# Patient Record
Sex: MALE | Race: WHITE | NOT HISPANIC OR LATINO | Employment: OTHER | URBAN - NONMETROPOLITAN AREA
[De-identification: names, ages, dates, MRNs, and addresses within clinical notes are randomized per-mention and may not be internally consistent; named-entity substitution may affect disease eponyms.]

---

## 2017-07-31 ENCOUNTER — COMMUNICATION - GICH (OUTPATIENT)
Dept: INTERNAL MEDICINE | Facility: OTHER | Age: 82
End: 2017-07-31

## 2017-07-31 DIAGNOSIS — I10 ESSENTIAL (PRIMARY) HYPERTENSION: ICD-10-CM

## 2017-08-18 ENCOUNTER — COMMUNICATION - GICH (OUTPATIENT)
Dept: INTERNAL MEDICINE | Facility: OTHER | Age: 82
End: 2017-08-18

## 2017-08-18 DIAGNOSIS — E78.5 HYPERLIPIDEMIA: ICD-10-CM

## 2017-08-21 ENCOUNTER — COMMUNICATION - GICH (OUTPATIENT)
Dept: INTERNAL MEDICINE | Facility: OTHER | Age: 82
End: 2017-08-21

## 2017-08-21 DIAGNOSIS — E78.5 HYPERLIPIDEMIA: ICD-10-CM

## 2017-11-07 ENCOUNTER — COMMUNICATION - GICH (OUTPATIENT)
Dept: INTERNAL MEDICINE | Facility: OTHER | Age: 82
End: 2017-11-07

## 2017-11-07 DIAGNOSIS — E78.5 HYPERLIPIDEMIA: ICD-10-CM

## 2017-11-07 DIAGNOSIS — I10 ESSENTIAL (PRIMARY) HYPERTENSION: ICD-10-CM

## 2017-11-07 DIAGNOSIS — R39.9 UNSPECIFIED SYMPTOMS AND SIGNS INVOLVING THE GENITOURINARY SYSTEM: ICD-10-CM

## 2017-11-16 ENCOUNTER — COMMUNICATION - GICH (OUTPATIENT)
Dept: INTERNAL MEDICINE | Facility: OTHER | Age: 82
End: 2017-11-16

## 2017-11-16 DIAGNOSIS — I10 ESSENTIAL (PRIMARY) HYPERTENSION: ICD-10-CM

## 2017-11-16 DIAGNOSIS — E78.5 HYPERLIPIDEMIA: ICD-10-CM

## 2017-11-29 ENCOUNTER — AMBULATORY - GICH (OUTPATIENT)
Dept: LAB | Facility: OTHER | Age: 82
End: 2017-11-29

## 2017-11-29 ENCOUNTER — HOSPITAL ENCOUNTER (OUTPATIENT)
Dept: RADIOLOGY | Facility: OTHER | Age: 82
End: 2017-11-29
Attending: INTERNAL MEDICINE

## 2017-11-29 ENCOUNTER — OFFICE VISIT - GICH (OUTPATIENT)
Dept: INTERNAL MEDICINE | Facility: OTHER | Age: 82
End: 2017-11-29

## 2017-11-29 ENCOUNTER — HISTORY (OUTPATIENT)
Dept: INTERNAL MEDICINE | Facility: OTHER | Age: 82
End: 2017-11-29

## 2017-11-29 DIAGNOSIS — I10 ESSENTIAL (PRIMARY) HYPERTENSION: ICD-10-CM

## 2017-11-29 DIAGNOSIS — L57.0 ACTINIC KERATOSIS: ICD-10-CM

## 2017-11-29 DIAGNOSIS — M79.672 PAIN OF LEFT FOOT: ICD-10-CM

## 2017-11-29 DIAGNOSIS — N18.30 CHRONIC KIDNEY DISEASE, STAGE III (MODERATE) (H): ICD-10-CM

## 2017-11-29 DIAGNOSIS — R39.9 UNSPECIFIED SYMPTOMS AND SIGNS INVOLVING THE GENITOURINARY SYSTEM: ICD-10-CM

## 2017-11-29 DIAGNOSIS — Z00.00 ENCOUNTER FOR GENERAL ADULT MEDICAL EXAMINATION WITHOUT ABNORMAL FINDINGS: ICD-10-CM

## 2017-11-29 DIAGNOSIS — I25.10 ATHEROSCLEROTIC HEART DISEASE OF NATIVE CORONARY ARTERY WITHOUT ANGINA PECTORIS: ICD-10-CM

## 2017-11-29 DIAGNOSIS — E78.5 HYPERLIPIDEMIA: ICD-10-CM

## 2017-11-29 DIAGNOSIS — I65.29 OCCLUSION AND STENOSIS OF UNSPECIFIED CAROTID ARTERY: ICD-10-CM

## 2017-11-29 LAB
A/G RATIO - HISTORICAL: 1.5 (ref 1–2)
ALBUMIN SERPL-MCNC: 4.3 G/DL (ref 3.5–5.7)
ALP SERPL-CCNC: 41 IU/L (ref 34–104)
ALT (SGPT) - HISTORICAL: 9 IU/L (ref 7–52)
ANION GAP - HISTORICAL: 11 (ref 5–18)
AST SERPL-CCNC: 17 IU/L (ref 13–39)
BILIRUB SERPL-MCNC: 0.9 MG/DL (ref 0.3–1)
BUN SERPL-MCNC: 21 MG/DL (ref 7–25)
BUN/CREAT RATIO - HISTORICAL: 17
CALCIUM SERPL-MCNC: 9.7 MG/DL (ref 8.6–10.3)
CHLORIDE SERPLBLD-SCNC: 103 MMOL/L (ref 98–107)
CHOL/HDL RATIO - HISTORICAL: 2.16
CHOLESTEROL TOTAL: 121 MG/DL
CO2 SERPL-SCNC: 25 MMOL/L (ref 21–31)
CREAT SERPL-MCNC: 1.25 MG/DL (ref 0.7–1.3)
GFR IF NOT AFRICAN AMERICAN - HISTORICAL: 55 ML/MIN/1.73M2
GLOBULIN - HISTORICAL: 2.8 G/DL (ref 2–3.7)
GLUCOSE SERPL-MCNC: 101 MG/DL (ref 70–105)
HDLC SERPL-MCNC: 56 MG/DL (ref 23–92)
LDLC SERPL CALC-MCNC: 50 MG/DL
NON-HDL CHOLESTEROL - HISTORICAL: 65 MG/DL
POTASSIUM SERPL-SCNC: 4.1 MMOL/L (ref 3.5–5.1)
PROT SERPL-MCNC: 7.1 G/DL (ref 6.4–8.9)
PROVIDER ORDERDED STATUS - HISTORICAL: NORMAL
SODIUM SERPL-SCNC: 139 MMOL/L (ref 133–143)
TRIGL SERPL-MCNC: 73 MG/DL

## 2017-11-29 ASSESSMENT — PATIENT HEALTH QUESTIONNAIRE - PHQ9: SUM OF ALL RESPONSES TO PHQ QUESTIONS 1-9: 0

## 2017-12-27 ENCOUNTER — OFFICE VISIT - GICH (OUTPATIENT)
Dept: INTERNAL MEDICINE | Facility: OTHER | Age: 82
End: 2017-12-27

## 2017-12-27 ENCOUNTER — HISTORY (OUTPATIENT)
Dept: INTERNAL MEDICINE | Facility: OTHER | Age: 82
End: 2017-12-27

## 2017-12-27 ENCOUNTER — COMMUNICATION - GICH (OUTPATIENT)
Dept: INTERNAL MEDICINE | Facility: OTHER | Age: 82
End: 2017-12-27

## 2017-12-27 DIAGNOSIS — I10 ESSENTIAL (PRIMARY) HYPERTENSION: ICD-10-CM

## 2017-12-27 ASSESSMENT — PATIENT HEALTH QUESTIONNAIRE - PHQ9: SUM OF ALL RESPONSES TO PHQ QUESTIONS 1-9: 0

## 2017-12-28 NOTE — TELEPHONE ENCOUNTER
Patient Information     Patient Name MRN Sex Alvaro Prasad Jr [id number] Male 1935      Telephone Encounter by Rohnda Solano RN at 2017  3:09 PM     Author:  Rhonda Solano RN Service:  (none) Author Type:  NURS- Registered Nurse     Filed:  2017  3:11 PM Encounter Date:  2017 Status:  Signed     :  Rhonda Solano RN (NURS- Registered Nurse)            Statins    Office visit in the past 12 months.    Last visit with TAYO PRIDE was on: 10/31/2016 in [facility]  Next visit with TAYO PRIDE is on: No future appointment listed with this provider  Next visit with Internal Medicine is on: No future appointment listed in this department    Lab testing requirements:  Lipids annually.  Repeat lipids 6-8 weeks after dosage or drug change.    Last Lipids:  Chol: 120    2016  T    2016  HDL:   51    2016  LDL:  52    2016  LDL DIRECT:  No results found in past 5 years    .    Concommitant use of fibrates and statins-If it is an addition to the medication list, review note and/or discuss with provider.  If already on medication list, refill.    Max refills 12 months from last office visit.      Angiotensin Receptor Blockers (ARB)    Office visit in the past 12 months or per provider note.    Lab test requirements:  Creatinine and Potassium annually, if ordering lab, order BMP.  CREATININE (mg/dL)    Date Value   2016 1.14     POTASSIUM (mmol/L)    Date Value   2016 3.9       Max refill for 12 months from last office visit or per provider note    BPH    Office visit in the past 12 months or per provider note.    Max refill for 12 months from last office visit or per provider note.    Patient is due for medication management appointment. Limited refill provided at this time. Particle Code message and/or letter sent for reminder to patient. Prescription refilled per RN Medication Refill Policy.................... Rhonda SONI  PJ Solano ....................  11/9/2017   3:10 PM

## 2017-12-28 NOTE — TELEPHONE ENCOUNTER
Patient Information     Patient Name MRN Sex Alvaro Prasad Jr [id number] Male 1935      Telephone Encounter by Neema Mustafa at 2017  1:24 PM     Author:  Neema Mustafa Service:  (none) Author Type:  (none)     Filed:  2017  1:25 PM Encounter Date:  2017 Status:  Signed     :  Neema Mustafa            Patient would like lab orders placed for his annual visit. Please place order, patient would like a call back once this is done since he has not scheduled yet.  Neema Mustafa LPN.......................... 2017  1:25 PM

## 2017-12-28 NOTE — TELEPHONE ENCOUNTER
Patient Information     Patient Name MRN Sex Alvaro Prasad Jr [id number] Male 1935      Telephone Encounter by Marti Capps RN at 2017  3:43 PM     Author:  Marti Capps RN Service:  (none) Author Type:  NURS- Registered Nurse     Filed:  2017  3:44 PM Encounter Date:  2017 Status:  Signed     :  Marti Capps RN (NURS- Registered Nurse)            Angiotensin Receptor Blockers (ARB)    Office visit in the past 12 months or per provider note.    Last visit with TAYO PRIDE was on: 10/31/2016 in [facility]  Next visit with TAYO PRIDE is on: No future appointment listed with this provider  Next visit with Internal Medicine is on: No future appointment listed in this department    Lab test requirements:  Creatinine and Potassium annually, if ordering lab, order BMP.  CREATININE (mg/dL)    Date Value   2016 1.14     POTASSIUM (mmol/L)    Date Value   2016 3.9       Max refill for 12 months from last office visit or per provider note    Prescription refilled per RN Medication Refill Policy.................... Marti Capps RN ....................  2017   3:44 PM

## 2017-12-28 NOTE — TELEPHONE ENCOUNTER
Patient Information     Patient Name MRN Sex Alvaro Prasad Jr [id number] Male 1935      Telephone Encounter by Michelle Lerner LPN at 2017  9:39 AM     Author:  Michelle Lerner LPN Service:  (none) Author Type:  NURS- Licensed Practical Nurse     Filed:  2017  9:41 AM Encounter Date:  2017 Status:  Signed     :  Michelle Lerner LPN (NURS- Licensed Practical Nurse)            Contacted the patient and let him know lab orders were placed. He was transferred to scheduling to set up a couple of appointments.  Michelle Lerner LPN......2017  9:40 AM

## 2017-12-28 NOTE — TELEPHONE ENCOUNTER
Patient Information     Patient Name MRN Alvaro Naylor Jr [id number] Male 1935      Telephone Encounter by Vannessa Hayes at 2017 11:59 AM     Author:  Vannessa Hayes Service:  (none) Author Type:  (none)     Filed:  2017 12:08 PM Encounter Date:  2017 Status:  Signed     :  Vannessa Hayes            Patient would like to speak with nurse regarding lab orders, and scheduling lab on the same day as their next physical. Thank you.

## 2017-12-28 NOTE — PATIENT INSTRUCTIONS
Patient Information     Patient Name MRN Sex Alvaro Prasad Jr [id number] Male 1935      Patient Instructions by Jose Raul Morris MD at 2017  2:20 PM     Author:  Jose Raul Morris MD Service:  (none) Author Type:  Physician     Filed:  2017  3:26 PM Encounter Date:  2017 Status:  Signed     :  Jose Raul Morris MD (Physician)            Change losartan 25 to losartan/HCT 50/12.5 once daily--for blood pressure and swelling in legs.  Continue all other medications without change.  Return in 1 month

## 2017-12-28 NOTE — PROGRESS NOTES
Patient Information     Patient Name MRN Sex Alvaro Prasad Jr [id number] Male 1935      Progress Notes by Jose Raul Morris MD at 2017  2:20 PM     Author:  Jose Raul Morris MD Service:  (none) Author Type:  Physician     Filed:  2017  4:22 PM Encounter Date:  2017 Status:  Signed     :  Jose Raul Morris MD (Physician)            SUBJECTIVE:    Alvaro Bass Jr is a 82 y.o. male who presents for comprehensive review of their multiple medical problems and review of medications, renewal of medications and update on necessary health maintenance issues.      HPI Comments: This patient is in today for complete evaluation and a review of his chronic medical problems. He has a history of hypertension. His blood pressure is high today. He is on multidrug therapy for this. His wife  a little over a year ago and he admits that his diet is just not the same as it used to be. He has much more sodium in his diet which might be contributing to his elevated blood pressure.    The patient also has a history of coronary artery disease with previous intervention approximately 15 years ago. He is not having any anginal symptoms. He does take moderate intensity statin therapy for control of his lipids. 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 He does continue to get this checked on a regular basis. The patient is having some left foot pain. This started abruptly a week ago with no injury. He is here to have this checked and evaluated. He is worried that it could be from diabetes.    Medications are all reconciled. Past medical history, past surgical history, family history and social histories are reviewed. All immunizations are up-to-date.      No Known Allergies,   Current Outpatient Prescriptions     Medication  Sig     aspirin 325 mg tablet Take 325 mg by mouth once daily with a  meal.     atenolol (TENORMIN) 50 mg tablet Take 1 tablet by mouth once daily.     FIBER-CAPS, PSYLLIUM HUSK, ORAL Take 1 capsule by mouth at bedtime.     losartan (COZAAR) 25 mg tablet TAKE ONE TABLET BY MOUTH ONCE DAILY     simvastatin (ZOCOR) 80 mg tablet TAKE ONE-HALF TABLET BY MOUTH ONCE DAILY     tamsulosin (FLOMAX) 0.4 mg capsule TAKE ONE CAPSULE BY MOUTH ONCE DAILY AFTER A MEAL     triamcinolone (ARISTOCORT) 0.1 % ointment Apply  topically to affected area(s) 3 times daily.     No current facility-administered medications for this visit.      Medications have been reviewed by me and are current to the best of my knowledge and ability. ,   Past Medical History:     Diagnosis  Date     Atrioventricular block, complete (HC)      Bladder neck obstruction      Carotid stenosis 2/2012    Moderate <60% bilateral on US      Chronic kidney disease (CKD), stage III (moderate) 8/21/2014     Coronary artery disease      Diverticulosis of sigmoid colon 3/9/2015     Hyperlipidemia      Hypertension      Inguinal hernia of right side without obstruction or gangrene 08/21/2014    laparoscopic repair, extensive lysis of adhension 8/29/2016, Dr. Mao      Inguinal hernia of right side without obstruction or gangrene 8/21/2014    Pantaloon hernia with larger indirect component with densely adherent terminal ileum and cecum; repaired transabdominally with mesh 8/29/2016       Lower urinary tract symptoms (LUTS) 8/21/2014     Non Q wave myocardial infarction (HC) 2/28/1999    with favorable post myocardial infarction thallium studies.      Personal history of colonic polyps 2000    not adenomatous    ,   Patient Active Problem List       Diagnosis  Date Noted     Hematoma of groin  08/29/2016     Post-procedurally after difficult BERNARDO laparoscopic right pantaloon inguinal hernia repair        Hyperlipidemia  09/21/2015     Health care maintenance  03/09/2015     History of colonic polyps  03/09/2015     Diverticulosis of sigmoid  colon  2015     Lower urinary tract symptoms (LUTS)  2014     Chronic kidney disease (CKD), stage III (moderate)  2014     C A D  2012     Occlusion and stenosis of carotid artery--moderate                 BLADDER OUTLET OBSTRUCTION       Prostatism          HYPERTENSION       Atrioventricular block, complete (HC)  2005     Personal history of MI (myocardial infarction)  1999     subendocardial        ,   Past Surgical History:      Procedure  Laterality Date     (IA) OR LAPAROSCOPIC INCARCERATED INGUINAL HERNIA REPAIR Right 2016    chronically incarcerated terminal ileum and cecum       APPENDECTOMY      open       CATARACT REMOVAL Bilateral      COLONOSCOPY DIAGNOSTIC  3/9/15     F/U N/A age       COLONOSCOPY SCREENING  2010    Numerous polyps-all hyperplastic.  Next due .       CORONARY STENT PLACEMENT      right coronary artery       CYSTOSCOPY       PACEMAKER PLACEMENT  2005    Dual chamber pacemaker, [employer], model:  E2ER01       PACEMAKER REPLACEMENT  2013            STRESS ECHO  6/15/2001    essentially negative      and   Social History      Substance Use Topics        Smoking status:  Former Smoker     Types: Cigarettes     Quit date: 1999     Smokeless tobacco:  Never Used     Alcohol use  No     Family Status     Relation  Status     Mother  at age 86     Father  at age 73     Brother  at age 51     Brother Alive     Sister Alive     Sister Alive     Sister Alive     Other      Social History     Social History        Marital status:       Spouse name:      Number of children:  N/A     Years of education:  N/A     Occupational History       retired      Social History Main Topics        Smoking status:  Former Smoker     Types: Cigarettes     Quit date: 1999     Smokeless tobacco:  Never Used     Alcohol use  No     Drug use:  No     Sexual activity:  Not Asked     Other Topics  Concern      "None      Social History Narrative     Was  to twice; second wife  2016 after a long mederos with cancer; she was admitted to hospice early 2016.  Retired from [employer].  Lives in [place].       REVIEW OF SYSTEMS:  Review of Systems   Constitutional: Negative for chills, diaphoresis, fever, malaise/fatigue and weight loss.   HENT: Negative for congestion, ear pain, nosebleeds, sore throat and tinnitus.    Eyes: Negative for blurred vision, double vision, photophobia, pain, discharge and redness.   Respiratory: Negative for cough, hemoptysis, sputum production, shortness of breath and wheezing.    Cardiovascular: Negative for chest pain, palpitations, orthopnea, claudication, leg swelling and PND.   Gastrointestinal: Negative for abdominal pain, blood in stool, constipation, diarrhea, heartburn, nausea and vomiting.   Genitourinary: Negative for dysuria, flank pain and hematuria.   Musculoskeletal: Negative for back pain, joint pain, myalgias and neck pain.        Foot pain   Skin: Negative for itching and rash.   Neurological: Negative for dizziness, tingling, tremors, speech change, loss of consciousness, weakness and headaches.   Psychiatric/Behavioral: Negative for depression, hallucinations, memory loss, substance abuse and suicidal ideas. The patient is not nervous/anxious.        OBJECTIVE:  /94  Pulse 88  Ht 1.702 m (5' 7\")  Wt 68.7 kg (151 lb 6.4 oz)  BMI 23.71 kg/m2    EXAM:   Physical Exam   Constitutional: He is oriented to person, place, and time and well-developed, well-nourished, and in no distress. No distress.   HENT:   Head: Normocephalic and atraumatic.   Right Ear: Tympanic membrane and external ear normal.   Left Ear: Tympanic membrane and external ear normal.   Nose: Nose normal.   Mouth/Throat: Oropharynx is clear and moist and mucous membranes are normal. No oropharyngeal exudate.   Eyes: Conjunctivae are normal. Pupils are equal, round, and reactive to " light. No scleral icterus.   Neck: Normal range of motion. Neck supple. Normal carotid pulses, no hepatojugular reflux and no JVD present. Carotid bruit is present. No tracheal deviation and no edema present. No thyroid mass and no thyromegaly present.   Cardiovascular: Normal rate, regular rhythm, normal heart sounds and intact distal pulses.  Exam reveals no gallop and no friction rub.    No murmur heard.  Pulmonary/Chest: Effort normal and breath sounds normal. No respiratory distress. He has no decreased breath sounds. He has no wheezes. He has no rhonchi. He has no rales. He exhibits no tenderness.       Abdominal: Soft. Bowel sounds are normal. He exhibits no distension and no mass. There is no hepatosplenomegaly. There is no tenderness. There is no rebound and no guarding. Hernia confirmed negative in the right inguinal area and confirmed negative in the left inguinal area.   Genitourinary: Rectum normal, testes/scrotum normal and penis normal. Prostate is enlarged.   Genitourinary Comments: 2+ prostate, otherwise normal.   Musculoskeletal: Normal range of motion. He exhibits edema. He exhibits no tenderness.   2+ pretibial edema.    He has tenderness at the proximal fifth metatarsal.   Lymphadenopathy:     He has no cervical adenopathy.   Neurological: He is alert and oriented to person, place, and time. He has normal motor skills. He displays normal reflexes. No cranial nerve deficit. He exhibits normal muscle tone. Gait normal. Coordination normal.   Skin: Skin is warm and dry. No rash noted. He is not diaphoretic. No cyanosis or erythema. No pallor. Nails show no clubbing.        Psychiatric: Mood, memory, affect and judgment normal.   Nursing note and vitals reviewed.      ASSESSMENT/PLAN:    ICD-10-CM    1. Hyperlipidemia, unspecified hyperlipidemia type E78.5 simvastatin (ZOCOR) 80 mg tablet   2. Lower urinary tract symptoms (LUTS) R39.9 tamsulosin (FLOMAX) 0.4 mg capsule   3. Chronic kidney disease  (CKD), stage III (moderate) N18.3    4. Atherosclerosis of native coronary artery of native heart without angina pectoris I25.10    5. HYPERTENSION I10 losartan-hydrochlorothiazide, 50-12.5 mg, (HYZAAR) 50-12.5 mg tablet   6. Health care maintenance Z00.00    7. Foot pain, left M79.672 XR FOOT 3 VIEWS LEFT   8. Actinic keratosis L57.0 HI DESTROY PREMALIGNANT 1ST LESION   9. Occlusion and stenosis of carotid artery I65.29    10. Occlusion and stenosis of carotid artery--moderate I65.29    11. Essential hypertension I10 atenolol (TENORMIN) 50 mg tablet        Plan:  His x-ray appeared unremarkable. He may have just strained this. Continued conservative measures for this. His blood pressure is high and he has peripheral edema, I changed his losartan to Hyzaar 50/12.5 one daily. Recheck in 1 month on his blood pressure and we will likely do a BMP at that time as well. The rest of his lab was reviewed today with the patient, everything else looks normal and fine. All other medications refilled without change. He will follow-up on the skin lesion as needed. We will test the foot further if needed if the pain persists. Otherwise appears to be healthy.

## 2017-12-28 NOTE — TELEPHONE ENCOUNTER
Patient Information     Patient Name MRAlvaro Mcarthur Jr [id number] Male 1935      Telephone Encounter by Sierra Betancur RN at 2017  2:23 PM     Author:  Sierra Betancur RN Service:  (none) Author Type:  NURS- Registered Nurse     Filed:  2017  2:23 PM Encounter Date:  2017 Status:  Signed     :  Sierra Betancur RN (NURS- Registered Nurse)            Refilled 17.  Unable to complete prescription refill per RN Medication Refill Policy.................... SIERRA BETANCUR RN ....................  2017   2:23 PM

## 2017-12-28 NOTE — TELEPHONE ENCOUNTER
Patient Information     Patient Name MRN Alvaro Naylor Jr [id number] Male 1935      Telephone Encounter by Sierra Betancur RN at 2017  1:45 PM     Author:  Sierra Betancur RN Service:  (none) Author Type:  NURS- Registered Nurse     Filed:  2017  1:46 PM Encounter Date:  2017 Status:  Signed     :  Sierra Betancur RN (NURS- Registered Nurse)            Statins    Office visit in the past 12 months.    Last visit with TAYO PRIDE was on: 10/31/2016 in [facility]  Next visit with TAYO PRIDE is on: No future appointment listed with this provider  Next visit with Internal Medicine is on: No future appointment listed in this department    Lab testing requirements:  Lipids annually.  Repeat lipids 6-8 weeks after dosage or drug change.    Last Lipids:  Chol: 120    2016  T    2016  HDL:   51    2016  LDL:  52    2016  LDL DIRECT:  No results found in past 5 years    .    Concommitant use of fibrates and statins-If it is an addition to the medication list, review note and/or discuss with provider.  If already on medication list, refill.    Max refills 12 months from last office visit.    Prescription refilled per RN Medication Refill Policy.................... SIERRA BETANCUR RN ....................  2017   1:46 PM

## 2017-12-30 NOTE — NURSING NOTE
Patient Information     Patient Name MRN Alvaro Naylor Jr [id number] Male 1935      Nursing Note by Michelle Lerner LPN at 2017  2:20 PM     Author:  Michelle Lerner LPN Service:  (none) Author Type:  NURS- Licensed Practical Nurse     Filed:  2017  2:42 PM Encounter Date:  2017 Status:  Signed     :  Michelle Lerner LPN (NURS- Licensed Practical Nurse)            The patient is here today to have a annual check up. The patient has not been to the eye doctor in the last year.  Michelle Lerner LPN......2017  2:31 PM

## 2018-01-19 PROBLEM — I65.29 OCCLUSION AND STENOSIS OF CAROTID ARTERY: Status: ACTIVE | Noted: 2018-01-19

## 2018-01-19 PROBLEM — N32.0 BLADDER OUTLET OBSTRUCTION: Status: ACTIVE | Noted: 2018-01-19

## 2018-01-19 PROBLEM — I10 HYPERTENSION: Status: ACTIVE | Noted: 2018-01-19

## 2018-01-19 RX ORDER — SIMVASTATIN 80 MG
40 TABLET ORAL DAILY
COMMUNITY
Start: 2017-11-29 | End: 2018-12-19

## 2018-01-19 RX ORDER — TAMSULOSIN HYDROCHLORIDE 0.4 MG/1
0.4 CAPSULE ORAL
COMMUNITY
Start: 2017-11-29 | End: 2018-12-19

## 2018-01-19 RX ORDER — TRIAMCINOLONE ACETONIDE 1 MG/G
OINTMENT TOPICAL
COMMUNITY
Start: 2013-08-15 | End: 2018-12-19

## 2018-01-19 RX ORDER — ASPIRIN 325 MG
325 TABLET ORAL
COMMUNITY

## 2018-01-19 RX ORDER — ATENOLOL 50 MG/1
50 TABLET ORAL DAILY
COMMUNITY
Start: 2017-11-29 | End: 2018-12-19

## 2018-01-19 RX ORDER — LOSARTAN POTASSIUM AND HYDROCHLOROTHIAZIDE 12.5; 5 MG/1; MG/1
2 TABLET ORAL DAILY
COMMUNITY
Start: 2017-12-27 | End: 2018-03-08

## 2018-01-24 ENCOUNTER — HISTORY (OUTPATIENT)
Dept: INTERNAL MEDICINE | Facility: OTHER | Age: 83
End: 2018-01-24

## 2018-01-24 ENCOUNTER — OFFICE VISIT - GICH (OUTPATIENT)
Dept: INTERNAL MEDICINE | Facility: OTHER | Age: 83
End: 2018-01-24

## 2018-01-24 DIAGNOSIS — I10 ESSENTIAL (PRIMARY) HYPERTENSION: ICD-10-CM

## 2018-01-24 LAB
ANION GAP - HISTORICAL: 10 (ref 5–18)
BUN SERPL-MCNC: 26 MG/DL (ref 7–25)
BUN/CREAT RATIO - HISTORICAL: 17
CALCIUM SERPL-MCNC: 9.5 MG/DL (ref 8.6–10.3)
CHLORIDE SERPLBLD-SCNC: 97 MMOL/L (ref 98–107)
CO2 SERPL-SCNC: 26 MMOL/L (ref 21–31)
CREAT SERPL-MCNC: 1.54 MG/DL (ref 0.7–1.3)
GFR IF NOT AFRICAN AMERICAN - HISTORICAL: 43 ML/MIN/1.73M2
GLUCOSE SERPL-MCNC: 102 MG/DL (ref 70–105)
POTASSIUM SERPL-SCNC: 4 MMOL/L (ref 3.5–5.1)
SODIUM SERPL-SCNC: 133 MMOL/L (ref 133–143)

## 2018-01-27 VITALS
SYSTOLIC BLOOD PRESSURE: 162 MMHG | BODY MASS INDEX: 23.76 KG/M2 | DIASTOLIC BLOOD PRESSURE: 94 MMHG | HEIGHT: 67 IN | HEART RATE: 88 BPM | WEIGHT: 151.4 LBS

## 2018-01-30 ASSESSMENT — PATIENT HEALTH QUESTIONNAIRE - PHQ9: SUM OF ALL RESPONSES TO PHQ QUESTIONS 1-9: 0

## 2018-02-09 VITALS
HEART RATE: 68 BPM | SYSTOLIC BLOOD PRESSURE: 130 MMHG | WEIGHT: 153 LBS | DIASTOLIC BLOOD PRESSURE: 80 MMHG | BODY MASS INDEX: 23.96 KG/M2

## 2018-02-09 VITALS
SYSTOLIC BLOOD PRESSURE: 158 MMHG | DIASTOLIC BLOOD PRESSURE: 94 MMHG | BODY MASS INDEX: 23.84 KG/M2 | HEART RATE: 80 BPM | WEIGHT: 152.2 LBS

## 2018-02-11 ASSESSMENT — PATIENT HEALTH QUESTIONNAIRE - PHQ9: SUM OF ALL RESPONSES TO PHQ QUESTIONS 1-9: 0

## 2018-02-12 NOTE — TELEPHONE ENCOUNTER
Patient Information     Patient Name MRN Alvaro Naylor Jr [id number] Male 1935      Telephone Encounter by Jacqui Saldaña RN at 2017 11:05 AM     Author:  Jacqui Saldaña RN Service:  (none) Author Type:  NURS- Registered Nurse     Filed:  2017 11:06 AM Encounter Date:  2017 Status:  Signed     :  Jacqui Saldaña RN (NURS- Registered Nurse)            Atenolol refilled on 17 #90 x 3 refills to [facility]ACQUI Clark, PJ ....................  2017   11:05 AM

## 2018-02-12 NOTE — PROGRESS NOTES
Patient Information     Patient Name MRN Sex Alvaro Prasad Jr [id number] Male 1935      Progress Notes by Jose Raul Morris MD at 2017  1:20 PM     Author:  Jose Raul Morris MD Service:  (none) Author Type:  Physician     Filed:  2017  1:46 PM Encounter Date:  2017 Status:  Signed     :  Jose Raul Morris MD (Physician)            SUBJECTIVE:    Alvaro Bass Jr is a 82 y.o. male who presents for recheck on BP and edema.    HPI Comments: He is here for follow-up on his blood pressure. The last time he was in I adjusted his medication by changing his losartan to hydrochlorothiazide containing formulation, Hyzaar 50/12.5 one daily. He tolerates this fine. Nonetheless, his blood pressure is high even at home. He tells me that he generally sees 150/80 on average. He is trying to avoid salt and his edema is better. We spent some time today reviewing labels and what the meetings were and I suggested that he try to shoot for a 2000 mg per day sodium goal.      No Known Allergies,   Current Outpatient Prescriptions     Medication  Sig     aspirin 325 mg tablet Take 325 mg by mouth once daily with a meal.     atenolol (TENORMIN) 50 mg tablet Take 1 tablet by mouth once daily.     FIBER-CAPS, PSYLLIUM HUSK, ORAL Take 1 capsule by mouth at bedtime.     losartan-hydrochlorothiazide, 50-12.5 mg, (HYZAAR) 50-12.5 mg tablet Take 1 tablet by mouth once daily.     simvastatin (ZOCOR) 80 mg tablet Take 0.5 tablets by mouth once daily.     tamsulosin (FLOMAX) 0.4 mg capsule Take 1 capsule by mouth once daily after a meal.     triamcinolone (ARISTOCORT) 0.1 % ointment Apply  topically to affected area(s) 3 times daily.     No current facility-administered medications for this visit.      Medications have been reviewed by me and are current to the best of my knowledge and ability. ,   Past Medical History:     Diagnosis  Date     Atrioventricular block, complete (HC)      Bladder neck obstruction      Carotid  stenosis 2/2012    Moderate <60% bilateral on US      Chronic kidney disease (CKD), stage III (moderate) 8/21/2014     Coronary artery disease      Diverticulosis of sigmoid colon 3/9/2015     Hyperlipidemia      Hypertension      Inguinal hernia of right side without obstruction or gangrene 08/21/2014    laparoscopic repair, extensive lysis of adhension 8/29/2016, Dr. Mao      Inguinal hernia of right side without obstruction or gangrene 8/21/2014    Pantaloon hernia with larger indirect component with densely adherent terminal ileum and cecum; repaired transabdominally with mesh 8/29/2016       Lower urinary tract symptoms (LUTS) 8/21/2014     Non Q wave myocardial infarction (HC) 2/28/1999    with favorable post myocardial infarction thallium studies.      Personal history of colonic polyps 2000    not adenomatous     and   Patient Active Problem List       Diagnosis  Date Noted     Hematoma of groin  08/29/2016     Post-procedurally after difficult BERNARDO laparoscopic right pantaloon inguinal hernia repair        Hyperlipidemia  09/21/2015     Health care maintenance  03/09/2015     History of colonic polyps  03/09/2015     Diverticulosis of sigmoid colon  03/09/2015     Lower urinary tract symptoms (LUTS)  08/21/2014     Chronic kidney disease (CKD), stage III (moderate)  08/21/2014     C A D  02/14/2012     Occlusion and stenosis of carotid artery--moderate                 BLADDER OUTLET OBSTRUCTION       Prostatism          HYPERTENSION       Atrioventricular block, complete (HC)  01/12/2005     Personal history of MI (myocardial infarction)  02/28/1999     subendocardial            REVIEW OF SYSTEMS:  Review of Systems   All other systems reviewed and are negative.      OBJECTIVE:  /94 (Cuff Site: Right Arm, Position: Sitting, Cuff Size: Adult Regular)  Pulse 80  Wt 69 kg (152 lb 3.2 oz)  BMI 23.84 kg/m2    EXAM:   Physical Exam   Constitutional: He is well-developed, well-nourished, and in no  distress. No distress.   Musculoskeletal: He exhibits edema.   1+ pretibial edema   Skin: He is not diaphoretic.   Nursing note and vitals reviewed.      ASSESSMENT/PLAN:    ICD-10-CM    1. HYPERTENSION I10 losartan-hydrochlorothiazide, 50-12.5 mg, (HYZAAR) 50-12.5 mg tablet        Plan:  Although his edema is somewhat better, his blood pressure is still high. He will double his losartan. I will have him back to see me in 1 month for recheck, we will plan a BMP at that time and will adjust his losartan dosing if we are going to keep him on that formulation. Follow-up sooner if there are problems.

## 2018-02-12 NOTE — NURSING NOTE
Patient Information     Patient Name MRN Alvaro Naylor Jr [id number] Male 1935      Nursing Note by Michelle Lerner LPN at 2017  1:20 PM     Author:  Michelle Lerner LPN Service:  (none) Author Type:  NURS- Licensed Practical Nurse     Filed:  2017  1:24 PM Encounter Date:  2017 Status:  Signed     :  Michelle Lerner LPN (NURS- Licensed Practical Nurse)            The patient is here today to have a one month follow up.  Michelle Lerner LPN......2017  1:19 PM

## 2018-02-13 ENCOUNTER — TELEPHONE (OUTPATIENT)
Dept: INTERNAL MEDICINE | Facility: OTHER | Age: 83
End: 2018-02-13

## 2018-02-13 DIAGNOSIS — I10 HYPERTENSION, UNSPECIFIED TYPE: Primary | ICD-10-CM

## 2018-02-13 RX ORDER — AMLODIPINE BESYLATE 5 MG/1
5 TABLET ORAL DAILY
Qty: 30 TABLET | Refills: 1 | Status: SHIPPED | OUTPATIENT
Start: 2018-02-13 | End: 2018-03-08

## 2018-02-13 NOTE — TELEPHONE ENCOUNTER
Pt sleeping on the cart, breathing noted.    The patient called in with his average blood pressure readings for AM and HS. He did 20 AM readings and the average was 147/76. He did 11 HS readings and the average was 184/87. The patient was told to report these and find out what to do.  LOLI ANGULO LPN 2/13/2018 1:57 PM

## 2018-02-13 NOTE — NURSING NOTE
Patient Information     Patient Name MRN Sex Alvaro Prasad Jr [id number] Male 1935      Nursing Note by Zane Booker at 2018 12:40 PM     Author:  Zane Booker Service:  (none) Author Type:  (none)     Filed:  2018 12:53 PM Encounter Date:  2018 Status:  Signed     :  Zane Booker            Patient presents to the clinic today for follow up on blood pressure.  Zane Booker LPN .............2018 12:36 PM

## 2018-02-13 NOTE — TELEPHONE ENCOUNTER
The patient was contacted and given the information below. He would like to know if he is to take this in addition to his other blood pressure medications.  LOLI ANGULO LPN 2/13/2018 2:13 PM

## 2018-02-13 NOTE — TELEPHONE ENCOUNTER
Start amlodipine 5 mg daily.  See me in 3 weeks.  Bring blood pressure cuff in with him so that we can test it for accuracy.

## 2018-03-08 ENCOUNTER — OFFICE VISIT (OUTPATIENT)
Dept: INTERNAL MEDICINE | Facility: OTHER | Age: 83
End: 2018-03-08
Attending: INTERNAL MEDICINE
Payer: MEDICARE

## 2018-03-08 VITALS
SYSTOLIC BLOOD PRESSURE: 138 MMHG | DIASTOLIC BLOOD PRESSURE: 80 MMHG | HEART RATE: 76 BPM | WEIGHT: 154.4 LBS | BODY MASS INDEX: 24.18 KG/M2

## 2018-03-08 DIAGNOSIS — I10 ESSENTIAL HYPERTENSION: Primary | ICD-10-CM

## 2018-03-08 DIAGNOSIS — I10 HYPERTENSION, UNSPECIFIED TYPE: ICD-10-CM

## 2018-03-08 PROCEDURE — G0463 HOSPITAL OUTPT CLINIC VISIT: HCPCS

## 2018-03-08 PROCEDURE — 99213 OFFICE O/P EST LOW 20 MIN: CPT | Performed by: INTERNAL MEDICINE

## 2018-03-08 RX ORDER — AMLODIPINE BESYLATE 5 MG/1
5 TABLET ORAL DAILY
Qty: 90 TABLET | Refills: 3 | Status: SHIPPED | OUTPATIENT
Start: 2018-03-08 | End: 2018-12-19

## 2018-03-08 RX ORDER — LOSARTAN POTASSIUM AND HYDROCHLOROTHIAZIDE 25; 100 MG/1; MG/1
1 TABLET ORAL DAILY
Qty: 90 TABLET | Refills: 3 | COMMUNITY
Start: 2018-03-08 | End: 2018-12-19

## 2018-03-08 ASSESSMENT — ENCOUNTER SYMPTOMS
EYES NEGATIVE: 1
ENDOCRINE NEGATIVE: 1
ALLERGIC/IMMUNOLOGIC NEGATIVE: 1
CONSTITUTIONAL NEGATIVE: 1

## 2018-03-08 NOTE — PROGRESS NOTES
Chief Complaint:  Follow up on hypertension.    HPI: He returns for follow-up on his blood pressure.  We have increased his Hyzaar and I have most recently added amlodipine 5 mg daily.  He comes in today with his blood pressure cuff per my instructions.  His blood pressure is running high at home most of the time.  I checked the accuracy of his blood pressure cuff today, it is running 20 points higher than my value.  He might have a little bit of edema in his feet from the amlodipine but nothing that he cannot tolerate.  He continues to try to limit sodium.    Past Medical History:   Diagnosis Date     Atherosclerotic heart disease of native coronary artery without angina pectoris     No Comments Provided     Atrioventricular block, complete (H)     No Comments Provided     Bladder neck obstruction     No Comments Provided     Chronic kidney disease, stage III (moderate)     8/21/2014     Diverticulosis of large intestine without perforation or abscess without bleeding     3/9/2015     Essential (primary) hypertension     No Comments Provided     History of colonic polyps     2000,not adenomatous     Hyperlipidemia     No Comments Provided     Non-ST elevation (NSTEMI) myocardial infarction (H)     2/28/1999,with favorable post myocardial infarction thallium studies.     Occlusion and stenosis of unspecified carotid artery     2/2012,Moderate <60% bilateral on US     Unilateral inguinal hernia without obstruction or gangrene     08/21/2014,laparoscopic repair, extensive lysis of adhension 8/29/2016, Dr. Mao       Past Surgical History:   Procedure Laterality Date     APPENDECTOMY OPEN      1953,open     COLONOSCOPY      2/4/2010,Numerous polyps-all hyperplastic.  Next due 2015.     COLONOSCOPY      3/9/15,F/U N/A age     CYSTOSCOPY      No Comments Provided     EXTRACAPSULAR CATARACT EXTRATION WITH INTRAOCULAR LENS IMPLANT Bilateral     No Comments Provided     HEART CATH, ANGIOPLASTY      2001,right coronary  artery     HERNIA REPAIR Right 2016    LAPAROSCOPIC INCARCERATED INGUINAL HERNIA REPAIR     IMPLANT PACEMAKER      1/2005,Dual chamber pacemaker, [employer], model:  [id number]     REPLACE PACEMAKER GENERATOR      8/21/2013       No Known Allergies    Current Outpatient Prescriptions   Medication Sig Dispense Refill     losartan-hydrochlorothiazide (HYZAAR) 100-25 MG per tablet Take 1 tablet by mouth daily 90 tablet 3     amLODIPine (NORVASC) 5 MG tablet Take 1 tablet (5 mg) by mouth daily 90 tablet 3     aspirin (GOODSENSE ASPIRIN) 325 MG tablet Take 325 mg by mouth daily with food       atenolol (TENORMIN) 50 MG tablet Take 50 mg by mouth daily       psyllium (TGT PSYLLIUM FIBER) 0.52 G capsule Take 1 capsule by mouth At Bedtime       simvastatin (ZOCOR) 80 MG tablet Take 40 mg by mouth daily       tamsulosin (FLOMAX) 0.4 MG capsule Take 0.4 mg by mouth daily with food       triamcinolone (KENALOG) 0.1 % ointment        [DISCONTINUED] amLODIPine (NORVASC) 5 MG tablet Take 1 tablet (5 mg) by mouth daily 30 tablet 1     [DISCONTINUED] losartan-hydrochlorothiazide (HYZAAR) 50-12.5 MG per tablet Take 2 tablets by mouth daily         Review of Systems   Constitutional: Negative.    Eyes: Negative.    Endocrine: Negative.    Allergic/Immunologic: Negative.        Physical Exam   Constitutional: He appears well-developed and well-nourished. No distress.   Skin: He is not diaphoretic.   Nursing note and vitals reviewed.      Assessment:        ICD-10-CM    1. Essential hypertension I10    2. Hypertension, unspecified type I10 amLODIPine (NORVASC) 5 MG tablet       Plan: His blood pressure is now well controlled.  His home cuff is not accurate, it was reading 20 points higher.  He will have it checked at the local clinic in [place] and will let me know if his blood pressure is running high.

## 2018-03-08 NOTE — MR AVS SNAPSHOT
"              After Visit Summary   3/8/2018    Alvaro Bass Jr.    MRN: [id number]           Patient Information     Date Of Birth          1935        Visit Information        Provider Department      3/8/2018 11:20 AM Jose Raul Morris MD [facility]        Today's Diagnoses     Essential hypertension    -  1    Hypertension, unspecified type           Follow-ups after your visit        Who to contact     If you have questions or need follow up information about today's clinic visit or your schedule please contact [facility] directly at [phone].  Normal or non-critical lab and imaging results will be communicated to you by [employer], letter or phone within 4 business days after the clinic has received the results. If you do not hear from us within 7 days, please contact the clinic through [employer] or phone. If you have a critical or abnormal lab result, we will notify you by phone as soon as possible.  Submit refill requests through [employer] [facility] or call your pharmacy and they will forward the refill request to us. Please allow 3 business days for your refill to be completed.          Additional Information About Your Visit        MyChart Information     [employer] lets you send messages to your doctor, view your test results, renew your prescriptions, schedule appointments and more. To sign up, go to [email or web address] . Click on \"Log in\" on the left side of the screen, which will take you to the Welcome page. Then click on \"Sign up Now\" on the right side of the page.     You will be asked to enter the access code listed below, as well as some personal information. Please follow the directions to create your username and password.     Your access code is: [email or web address]  Expires: 2018 11:57 AM     Your access code will  in 90 days. If you need help or a new code, please call your [place] clinic or [phone].        Care EveryWhere ID     This is your Care " EveryWhere ID. This could be used by other organizations to access your [facility] medical records  [id number]        Your Vitals Were     Pulse BMI (Body Mass Index)                76 24.18 kg/m2           Blood Pressure from Last 3 Encounters:   03/08/18 138/80   01/24/18 130/80   12/27/17 (!) 158/94    Weight from Last 3 Encounters:   03/08/18 154 lb 6.4 oz (70 kg)   01/24/18 153 lb (69.4 kg)   12/27/17 152 lb 3.2 oz (69 kg)              Today, you had the following     No orders found for display         Today's Medication Changes          These changes are accurate as of 3/8/18 11:57 AM.  If you have any questions, ask your nurse or doctor.               These medicines have changed or have updated prescriptions.        Dose/Directions    HYZAAR 100-25 MG per tablet   This may have changed:  Another medication with the same name was removed. Continue taking this medication, and follow the directions you see here.   Generic drug:  losartan-hydrochlorothiazide   Changed by:  Jose Raul Morris MD        Dose:  1 tablet   Take 1 tablet by mouth daily   Quantity:  90 tablet   Refills:  3            Where to get your medicines      These medications were sent to [facility] [address] [place] [address]     Phone:  [phone]     amLODIPine 5 MG tablet                Primary Care Provider Office Phone # Fax #    Jose Raul Morris [phone]       [address] [facility] [address]        Equal Access to Services     [facility] CLARY AH: Hadii aad ku hadasho Soomaali, waaxda luqadaha, qaybta kaalmada adeegyada, saira cary. So [facility] [phone].    ATENCIÓN: Si habla español, tiene a morrow disposición servicios gratuitos de asistencia lingüística. Llame al [phone].    We comply with applicable federal civil rights laws and [place] laws. We do not discriminate on the basis of race, color, national  origin, age, disability, sex, sexual orientation, or gender identity.            Thank you!     Thank you for choosing [facility] AND \[facility]\""  for your care. Our goal is always to provide you with excellent care. Hearing back from our patients is one way we can continue to improve our services. Please take a few minutes to complete the written survey that you may receive in the mail after your visit with us. Thank you!             Your Updated Medication List - Protect others around you: Learn how to safely use, store and throw away your medicines at www.disposemymeds.org.          This list is accurate as of 3/8/18 11:57 AM.  Always use your most recent med list.                   Brand Name Dispense Instructions for use Diagnosis    amLODIPine 5 MG tablet    NORVASC    90 tablet    Take 1 tablet (5 mg) by mouth daily    Hypertension, unspecified type       atenolol 50 MG tablet    TENORMIN     Take 50 mg by mouth daily        GOODSENSE ASPIRIN 325 MG tablet   Generic drug:  aspirin      Take 325 mg by mouth daily with food        HYZAAR 100-25 MG per tablet   Generic drug:  losartan-hydrochlorothiazide     90 tablet    Take 1 tablet by mouth daily        simvastatin 80 MG tablet    ZOCOR     Take 40 mg by mouth daily        tamsulosin 0.4 MG capsule    FLOMAX     Take 0.4 mg by mouth daily with food        TGT PSYLLIUM FIBER 0.52 G capsule   Generic drug:  psyllium      Take 1 capsule by mouth At Bedtime        triamcinolone 0.1 % ointment    KENALOG

## 2018-03-08 NOTE — NURSING NOTE
The patient is here today to have his blood pressure checked.  LOIL ANGULO LPN 3/8/2018 11:25 AM

## 2018-03-09 ASSESSMENT — PATIENT HEALTH QUESTIONNAIRE - PHQ9: SUM OF ALL RESPONSES TO PHQ QUESTIONS 1-9: 0

## 2018-07-18 ENCOUNTER — MYC MEDICAL ADVICE (OUTPATIENT)
Dept: INTERNAL MEDICINE | Facility: OTHER | Age: 83
End: 2018-07-18

## 2018-07-18 NOTE — TELEPHONE ENCOUNTER
What is this supposed to mean?  I instructed the patient to discuss with his pharmacist as to whether or not his valsartan may be of the lot that could be contaminated.  If so, we certainly could change him to losartan.

## 2018-07-18 NOTE — TELEPHONE ENCOUNTER
The patient stated he is on the losartan medication and he stated the pharmacist told him that it is just fine and nothing needs to be changed.  Michelle Lerner LPN on 7/18/2018 at 2:15 PM

## 2018-07-18 NOTE — TELEPHONE ENCOUNTER
Contacted the patient and let him know the information below.  Michelle Lerner LPN on 7/18/2018 at 12:42 PM

## 2018-07-23 NOTE — PROGRESS NOTES
Patient Information     Patient Name  Alvaro Bass Jr MRN  [id number] Sex  Male   1935      Letter by Jose Raul Morris MD at      Author:  Jose Raul Morris MD Service:  (none) Author Type:  (none)    Filed:   Encounter Date:  2017 Status:  (Other)           Alvaro Bass Jr  [address]  [place] [address]          2017    Dear Mr. Graf Canales:    This letter is to remind you that you are due for your annual exam with Jose Raul Morris MD. Your last comprehensive visit was more than 12 months ago.    LIMITED refills of       losartan (COZAAR) 25 mg tablet      tamsulosin (FLOMAX) 0.4 mg capsule      simvastatin (ZOCOR) 80 mg tablet   have been called into your pharmacy. Additional refills require you to complete this appointment.    Please call the clinic at [phone] to schedule your appointment.    If you should require additional refills before your scheduled appointment, please contact your pharmacy and we will refill your medication until the date of your appointment.    If you are no longer seeing Jose Raul Morris MD for primary care, please call to let us know. Doing so will remove you from our call/contact list.    Thank you for choosing [facility] [place] and [facility] for your health care needs.    Sincerely,    Refill RN  [facility] [place]

## 2018-12-19 ENCOUNTER — OFFICE VISIT (OUTPATIENT)
Dept: INTERNAL MEDICINE | Facility: OTHER | Age: 83
End: 2018-12-19
Attending: INTERNAL MEDICINE
Payer: MEDICARE

## 2018-12-19 VITALS
RESPIRATION RATE: 18 BRPM | HEIGHT: 67 IN | BODY MASS INDEX: 24.01 KG/M2 | SYSTOLIC BLOOD PRESSURE: 118 MMHG | WEIGHT: 153 LBS | DIASTOLIC BLOOD PRESSURE: 76 MMHG | HEART RATE: 84 BPM

## 2018-12-19 DIAGNOSIS — I10 HYPERTENSION, UNSPECIFIED TYPE: ICD-10-CM

## 2018-12-19 DIAGNOSIS — R39.9 LOWER URINARY TRACT SYMPTOMS (LUTS): ICD-10-CM

## 2018-12-19 DIAGNOSIS — I65.23 BILATERAL CAROTID ARTERY STENOSIS: ICD-10-CM

## 2018-12-19 DIAGNOSIS — E78.5 HYPERLIPIDEMIA, UNSPECIFIED HYPERLIPIDEMIA TYPE: Primary | ICD-10-CM

## 2018-12-19 DIAGNOSIS — N18.30 CHRONIC KIDNEY DISEASE (CKD), STAGE III (MODERATE) (H): ICD-10-CM

## 2018-12-19 DIAGNOSIS — I10 ESSENTIAL HYPERTENSION: ICD-10-CM

## 2018-12-19 DIAGNOSIS — I25.10 ATHEROSCLEROSIS OF NATIVE CORONARY ARTERY OF NATIVE HEART WITHOUT ANGINA PECTORIS: ICD-10-CM

## 2018-12-19 LAB
ALBUMIN SERPL-MCNC: 4.2 G/DL (ref 3.5–5.7)
ALP SERPL-CCNC: 49 U/L (ref 34–104)
ALT SERPL W P-5'-P-CCNC: 11 U/L (ref 7–52)
ANION GAP SERPL CALCULATED.3IONS-SCNC: 7 MMOL/L (ref 3–14)
AST SERPL W P-5'-P-CCNC: 18 U/L (ref 13–39)
BILIRUB SERPL-MCNC: 0.4 MG/DL (ref 0.3–1)
BUN SERPL-MCNC: 31 MG/DL (ref 7–25)
CALCIUM SERPL-MCNC: 9.3 MG/DL (ref 8.6–10.3)
CHLORIDE SERPL-SCNC: 101 MMOL/L (ref 98–107)
CHOLEST SERPL-MCNC: 104 MG/DL
CO2 SERPL-SCNC: 28 MMOL/L (ref 21–31)
CREAT SERPL-MCNC: 1.67 MG/DL (ref 0.7–1.3)
GFR SERPL CREATININE-BSD FRML MDRD: 39 ML/MIN/{1.73_M2}
GLUCOSE SERPL-MCNC: 110 MG/DL (ref 70–105)
HDLC SERPL-MCNC: 46 MG/DL (ref 23–92)
LDLC SERPL CALC-MCNC: 46 MG/DL
NONHDLC SERPL-MCNC: 58 MG/DL
POTASSIUM SERPL-SCNC: 4.1 MMOL/L (ref 3.5–5.1)
PROT SERPL-MCNC: 6.9 G/DL (ref 6.4–8.9)
SODIUM SERPL-SCNC: 136 MMOL/L (ref 134–144)
TRIGL SERPL-MCNC: 59 MG/DL

## 2018-12-19 PROCEDURE — 36415 COLL VENOUS BLD VENIPUNCTURE: CPT | Performed by: INTERNAL MEDICINE

## 2018-12-19 PROCEDURE — 99215 OFFICE O/P EST HI 40 MIN: CPT | Performed by: INTERNAL MEDICINE

## 2018-12-19 PROCEDURE — G0463 HOSPITAL OUTPT CLINIC VISIT: HCPCS

## 2018-12-19 PROCEDURE — 80053 COMPREHEN METABOLIC PANEL: CPT | Performed by: INTERNAL MEDICINE

## 2018-12-19 PROCEDURE — 80061 LIPID PANEL: CPT | Performed by: INTERNAL MEDICINE

## 2018-12-19 RX ORDER — TRIAMCINOLONE ACETONIDE 1 MG/G
OINTMENT TOPICAL 3 TIMES DAILY
COMMUNITY
Start: 2018-12-19 | End: 2019-12-23

## 2018-12-19 RX ORDER — LOSARTAN POTASSIUM AND HYDROCHLOROTHIAZIDE 25; 100 MG/1; MG/1
1 TABLET ORAL DAILY
Qty: 90 TABLET | Refills: 3 | Status: SHIPPED | OUTPATIENT
Start: 2018-12-19 | End: 2019-12-23

## 2018-12-19 RX ORDER — TAMSULOSIN HYDROCHLORIDE 0.4 MG/1
0.4 CAPSULE ORAL
Qty: 90 CAPSULE | Refills: 3 | Status: SHIPPED | OUTPATIENT
Start: 2018-12-19 | End: 2019-12-23

## 2018-12-19 RX ORDER — AMLODIPINE BESYLATE 2.5 MG/1
2.5 TABLET ORAL DAILY
Qty: 90 TABLET | Refills: 3 | Status: SHIPPED | OUTPATIENT
Start: 2018-12-19 | End: 2019-12-23

## 2018-12-19 RX ORDER — SIMVASTATIN 80 MG
40 TABLET ORAL DAILY
Qty: 45 TABLET | Refills: 3 | Status: SHIPPED | OUTPATIENT
Start: 2018-12-19 | End: 2019-12-23

## 2018-12-19 RX ORDER — ATENOLOL 50 MG/1
50 TABLET ORAL DAILY
Qty: 90 TABLET | Refills: 3 | Status: SHIPPED | OUTPATIENT
Start: 2018-12-19 | End: 2019-12-07

## 2018-12-19 ASSESSMENT — ENCOUNTER SYMPTOMS
DIZZINESS: 0
PALPITATIONS: 0
WOUND: 0
TREMORS: 0
DYSURIA: 0
NERVOUS/ANXIOUS: 0
SEIZURES: 0
SORE THROAT: 0
NUMBNESS: 0
FREQUENCY: 0
FEVER: 0
AGITATION: 0
BLOOD IN STOOL: 0
COUGH: 0
DIAPHORESIS: 0
TROUBLE SWALLOWING: 0
SINUS PAIN: 0
EYE REDNESS: 0
NAUSEA: 0
VOICE CHANGE: 0
JOINT SWELLING: 0
BACK PAIN: 0
ABDOMINAL DISTENTION: 0
FLANK PAIN: 0
LIGHT-HEADEDNESS: 0
SINUS PRESSURE: 0
NECK PAIN: 0
SLEEP DISTURBANCE: 0
CONSTIPATION: 0
HEADACHES: 0
BRUISES/BLEEDS EASILY: 0
NECK STIFFNESS: 0
UNEXPECTED WEIGHT CHANGE: 0
CHILLS: 0
FATIGUE: 0
WEAKNESS: 0
HALLUCINATIONS: 0
CHEST TIGHTNESS: 0
DIARRHEA: 0
SPEECH DIFFICULTY: 0
ARTHRALGIAS: 1
EYE PAIN: 0
CONFUSION: 0
DIFFICULTY URINATING: 0
APPETITE CHANGE: 0
WHEEZING: 0
HEMATURIA: 0
VOMITING: 0
ADENOPATHY: 0
SHORTNESS OF BREATH: 0
RHINORRHEA: 0
ABDOMINAL PAIN: 0

## 2018-12-19 ASSESSMENT — PATIENT HEALTH QUESTIONNAIRE - PHQ9: SUM OF ALL RESPONSES TO PHQ QUESTIONS 1-9: 0

## 2018-12-19 ASSESSMENT — MIFFLIN-ST. JEOR: SCORE: 1347.63

## 2018-12-19 NOTE — LETTER
December 19, 2018      Alvaro Bass Jr.  [address] [place] [address]        Dear Alvaro Bass Jr.,    Below are the results of your recent labs:    Results for orders placed or performed in visit on 12/19/18   Lipid Panel   Result Value Ref Range    Cholesterol 104 <200 mg/dL    Triglycerides 59 <150 mg/dL    HDL Cholesterol 46 23 - 92 mg/dL    LDL Cholesterol Calculated 46 <100 mg/dL    Non HDL Cholesterol 58 <130 mg/dL   Comprehensive Metabolic Panel   Result Value Ref Range    Sodium 136 134 - 144 mmol/L    Potassium 4.1 3.5 - 5.1 mmol/L    Chloride 101 98 - 107 mmol/L    Carbon Dioxide 28 21 - 31 mmol/L    Anion Gap 7 3 - 14 mmol/L    Glucose 110 (H) 70 - 105 mg/dL    Urea Nitrogen 31 (H) 7 - 25 mg/dL    Creatinine 1.67 (H) 0.70 - 1.30 mg/dL    GFR Estimate 39 (L) >60 mL/min/[1.73_m2]    GFR Estimate If Black 48 (L) >60 mL/min/[1.73_m2]    Calcium 9.3 8.6 - 10.3 mg/dL    Bilirubin Total 0.4 0.3 - 1.0 mg/dL    Albumin 4.2 3.5 - 5.7 g/dL    Protein Total 6.9 6.4 - 8.9 g/dL    Alkaline Phosphatase 49 34 - 104 U/L    ALT 11 7 - 52 U/L    AST 18 13 - 39 U/L        In general, your blood tests look fine.  Your kidney tests are elevated as I discussed with you in the clinic.  They are actually a little bit higher than they have been in the past so make sure that you definitely avoid ibuprofen.  If you have any questions about your results, feel free to contact me.    Sincerely,        Jose Raul Morris MD  Internal Medicine  [facility]

## 2018-12-19 NOTE — PROGRESS NOTES
Chief Complaint:  This patient is here for a comprehensive review of their multiple medical problems, renewal of medications and update on necessary health maintenance issues.      HPI: This patient is here today for complete evaluation and review of his chronic medical problems.  He has a remote history of atherosclerotic coronary artery disease with previous intervention.  He has absolutely no cardiac symptoms at this point in time.  He did have heart block and is status post pacemaker.  He continues to get this checked on a regular basis.    The patient has a history of hypertension and is on multidrug therapy for control of this.  He does have some chronic kidney disease as a result of this.  He is also having some trouble with swelling in his feet which he wonders about the possibility of 1 of his medications causing this.  I told him that the amlodipine certainly could be contributing to this.    He has treated hyperlipidemia and is on moderate intensity statin therapy which he tolerates well.  He has a history of nonocclusive bilateral carotid stenosis as well as a heart disease as a result of his hyperlipidemia.  He does have lower urinary tract symptoms and takes Flomax on a regular basis.    He has a rash on his right temple area that he would like checked.  He had some pain in his right knee for a few days but that has gone away.  Otherwise he feels well.  Medications are reconciled.  Past medical history, past surgical history, family history and social histories are reviewed and updated.  Immunizations are up-to-date.  He is due for lab work today.    Past Medical History:   Diagnosis Date     Atherosclerotic heart disease of native coronary artery without angina pectoris     No Comments Provided     Atrioventricular block, complete (H)     No Comments Provided     Bladder neck obstruction     No Comments Provided     Chronic kidney disease, stage III (moderate) (H)     8/21/2014     Diverticulosis of  large intestine without perforation or abscess without bleeding     3/9/2015     Essential (primary) hypertension     No Comments Provided     History of colonic polyps     2000,not adenomatous     Hyperlipidemia     No Comments Provided     Non-ST elevation (NSTEMI) myocardial infarction (H)     2/28/1999,with favorable post myocardial infarction thallium studies.     Occlusion and stenosis of unspecified carotid artery     2/2012,Moderate <60% bilateral on US       Past Surgical History:   Procedure Laterality Date     APPENDECTOMY OPEN  1953     COLONOSCOPY      2/4/2010,Numerous polyps-all hyperplastic.  Next due 2015.     COLONOSCOPY      3/9/15,F/U N/A age     CYSTOSCOPY      No Comments Provided     EXTRACAPSULAR CATARACT EXTRATION WITH INTRAOCULAR LENS IMPLANT Bilateral      HEART CATH, ANGIOPLASTY      2001,right coronary artery     HERNIA REPAIR Right 2016    LAPAROSCOPIC INCARCERATED INGUINAL HERNIA REPAIR     IMPLANT PACEMAKER      1/2005,Dual chamber pacemaker, [employer], model:  [id number]     REPLACE PACEMAKER GENERATOR      8/21/2013       Current Outpatient Medications   Medication Sig Dispense Refill     amLODIPine (NORVASC) 2.5 MG tablet Take 1 tablet (2.5 mg) by mouth daily 90 tablet 3     aspirin (GOODSENSE ASPIRIN) 325 MG tablet Take 325 mg by mouth daily with food       atenolol (TENORMIN) 50 MG tablet Take 1 tablet (50 mg) by mouth daily 90 tablet 3     losartan-hydrochlorothiazide (HYZAAR) 100-25 MG tablet Take 1 tablet by mouth daily 90 tablet 3     psyllium (TGT PSYLLIUM FIBER) 0.52 G capsule Take 1 capsule by mouth At Bedtime       simvastatin (ZOCOR) 80 MG tablet Take 0.5 tablets (40 mg) by mouth daily 45 tablet 3     tamsulosin (FLOMAX) 0.4 MG capsule Take 1 capsule (0.4 mg) by mouth daily with food 90 capsule 3     triamcinolone (KENALOG) 0.1 % external ointment Apply topically 3 times daily         No Known Allergies    Family History   Problem Relation Age of Onset     Other - See  Comments Mother         Old age     Heart Disease Father      Other - See Comments Father         Stroke     Other - See Comments Other         severe COPD     Cancer Brother         Lung       Social History     Socioeconomic History     Marital status:      Spouse name:      Number of children: Not on file     Years of education: Not on file     Highest education level: Not on file   Social Needs     Financial resource strain: Not on file     Food insecurity - worry: Not on file     Food insecurity - inability: Not on file     Transportation needs - medical: Not on file     Transportation needs - non-medical: Not on file   Occupational History     Not on file   Tobacco Use     Smoking status: Former Smoker     Types: Cigarettes     Last attempt to quit: 1999     Years since quittin.9     Smokeless tobacco: Never Used   Substance and Sexual Activity     Alcohol use: No     Alcohol/week: 0.0 oz     Drug use: No     Comment: Drug use: No     Sexual activity: Not on file   Other Topics Concern     Parent/sibling w/ CABG, MI or angioplasty before 65F 55M? Not Asked   Social History Narrative    Was  to twice; second wife  2016 after a long mederos with cancer; she was admitted to hospice early 2016.  Retired from [employer], [employer].  Lives in [place].       Review of Systems   Constitutional: Negative for appetite change, chills, diaphoresis, fatigue, fever and unexpected weight change.   HENT: Negative for ear pain, rhinorrhea, sinus pressure, sinus pain, sore throat, trouble swallowing and voice change.    Eyes: Negative for pain, redness and visual disturbance.   Respiratory: Negative for cough, chest tightness, shortness of breath and wheezing.    Cardiovascular: Positive for leg swelling. Negative for chest pain and palpitations.   Gastrointestinal: Negative for abdominal distention, abdominal pain, blood in stool, constipation, diarrhea, nausea and vomiting.   Endocrine:  Negative for cold intolerance and heat intolerance.   Genitourinary: Negative for difficulty urinating, dysuria, flank pain, frequency and hematuria.   Musculoskeletal: Positive for arthralgias. Negative for back pain, joint swelling, neck pain and neck stiffness.   Skin: Negative for rash and wound.   Allergic/Immunologic: Negative for immunocompromised state.   Neurological: Negative for dizziness, tremors, seizures, syncope, speech difficulty, weakness, light-headedness, numbness and headaches.   Hematological: Negative for adenopathy. Does not bruise/bleed easily.   Psychiatric/Behavioral: Negative for agitation, behavioral problems, confusion, hallucinations and sleep disturbance. The patient is not nervous/anxious.        Physical Exam   Constitutional: He is oriented to person, place, and time. He appears well-developed and well-nourished. No distress.   HENT:   Head: Normocephalic.   Right Ear: External ear normal.   Left Ear: External ear normal.   Nose: Nose normal.   Mouth/Throat: Oropharynx is clear and moist. No oropharyngeal exudate.   Eyes: Conjunctivae are normal. Pupils are equal, round, and reactive to light.   Neck: Normal range of motion. Neck supple. Normal carotid pulses and no JVD present. Carotid bruit is present. No tracheal deviation present. No thyromegaly present.   Cardiovascular: Normal rate, regular rhythm and normal heart sounds. Exam reveals no gallop and no friction rub.   No murmur heard.  Pulmonary/Chest: Effort normal and breath sounds normal. No stridor. No respiratory distress. He has no wheezes. He has no rales.   Abdominal: Soft. Bowel sounds are normal. He exhibits no distension and no mass. There is no tenderness. There is no rebound and no guarding. No hernia.   Genitourinary: Rectum normal and penis normal. Prostate is enlarged.   Genitourinary Comments: 3+ prostate, symmetric   Musculoskeletal: Normal range of motion. He exhibits edema.   2+ right, 1+ left lower  extremity edema   Lymphadenopathy:     He has no cervical adenopathy.   Neurological: He is alert and oriented to person, place, and time. He displays normal reflexes. No cranial nerve deficit or sensory deficit. He exhibits normal muscle tone. Coordination normal.   Skin: Skin is warm and dry. No rash noted. He is not diaphoretic.   Small benign-appearing eczematous patch on his right temple area   Psychiatric: He has a normal mood and affect.   Nursing note and vitals reviewed.      Assessment:      ICD-10-CM    1. Hyperlipidemia, unspecified hyperlipidemia type E78.5    2. Atherosclerosis of native coronary artery of native heart without angina pectoris I25.10 simvastatin (ZOCOR) 80 MG tablet     Comprehensive Metabolic Panel     Lipid Panel   3. Essential hypertension I10 atenolol (TENORMIN) 50 MG tablet     losartan-hydrochlorothiazide (HYZAAR) 100-25 MG tablet   4. Bilateral carotid artery stenosis I65.23    5. Chronic kidney disease (CKD), stage III (moderate) (H) N18.3    6. Lower urinary tract symptoms (LUTS) R39.9 tamsulosin (FLOMAX) 0.4 MG capsule   7. Hypertension, unspecified type I10 amLODIPine (NORVASC) 2.5 MG tablet        Plan: He appears to be doing well other than the edema in his ankles.  Since his blood pressure is well controlled I elected to decrease his amlodipine to 2.5 mg daily.  He needs to avoid sodium and ibuprofen.  If his swelling persists, he will need to return for reevaluation.  Other medications will continue without change.  Complete lab drawn and pending, I will send a letter with the results.  We will consider a carotid ultrasound again next year depending on his overall health, etc.  Everything else appears to be up-to-date.  Follow-up will therefore be annually if all is well.  Suggested that he continue using the cortisone cream for his ear dermatitis as needed and I want him to try it on the little patch of dermatitis on his right temple as well.

## 2018-12-19 NOTE — NURSING NOTE
"The patient is here today to get a refill on his medications.  Michelle Lerner on 12/19/2018 at 8:36 AM  Chief Complaint   Patient presents with     Recheck Medication       Initial /76 (BP Location: Right arm, Patient Position: Sitting, Cuff Size: Adult Regular)   Pulse 84   Resp 18   Ht 1.702 m (5' 7\")   Wt 69.4 kg (153 lb)   BMI 23.96 kg/m   Estimated body mass index is 23.96 kg/m  as calculated from the following:    Height as of this encounter: 1.702 m (5' 7\").    Weight as of this encounter: 69.4 kg (153 lb).  Medication Reconciliation: complete    Michelle Lerner    "

## 2018-12-25 ENCOUNTER — MYC MEDICAL ADVICE (OUTPATIENT)
Dept: INTERNAL MEDICINE | Facility: OTHER | Age: 83
End: 2018-12-25

## 2019-03-27 ENCOUNTER — MYC MEDICAL ADVICE (OUTPATIENT)
Dept: INTERNAL MEDICINE | Facility: OTHER | Age: 84
End: 2019-03-27

## 2019-03-28 ENCOUNTER — TELEPHONE (OUTPATIENT)
Dept: INTERNAL MEDICINE | Facility: OTHER | Age: 84
End: 2019-03-28

## 2019-03-28 NOTE — TELEPHONE ENCOUNTER
Contacted the patient he thinks he has psoriasis on his scalp. He has tried the creams listed below and it helps relieve the itch but is not clearing it up. He reports his friend is a nurse and stated there is a oral medication that should be prescribed to help clear this up. The patient is requesting that be sent to his pharmacy.  Michelle Lerner LPN on 3/28/2019 at 10:38 AM

## 2019-03-28 NOTE — TELEPHONE ENCOUNTER
The patient was contacted and given the information below. He was transferred to [facility] to make a appointment.  Michelle Lerner LPN on 3/28/2019 at 11:45 AM

## 2019-04-01 ENCOUNTER — OFFICE VISIT (OUTPATIENT)
Dept: INTERNAL MEDICINE | Facility: OTHER | Age: 84
End: 2019-04-01
Attending: INTERNAL MEDICINE
Payer: MEDICARE

## 2019-04-01 VITALS
WEIGHT: 153.4 LBS | SYSTOLIC BLOOD PRESSURE: 132 MMHG | TEMPERATURE: 97.6 F | BODY MASS INDEX: 24.03 KG/M2 | RESPIRATION RATE: 16 BRPM | DIASTOLIC BLOOD PRESSURE: 80 MMHG | HEART RATE: 68 BPM

## 2019-04-01 DIAGNOSIS — L21.9 SEBORRHEIC DERMATITIS OF SCALP: Primary | ICD-10-CM

## 2019-04-01 PROCEDURE — G0463 HOSPITAL OUTPT CLINIC VISIT: HCPCS

## 2019-04-01 PROCEDURE — 99213 OFFICE O/P EST LOW 20 MIN: CPT | Performed by: INTERNAL MEDICINE

## 2019-04-01 RX ORDER — KETOCONAZOLE 20 MG/ML
SHAMPOO TOPICAL
Qty: 120 ML | Refills: 3 | Status: SHIPPED | OUTPATIENT
Start: 2019-04-01 | End: 2019-12-23

## 2019-04-01 ASSESSMENT — PATIENT HEALTH QUESTIONNAIRE - PHQ9: SUM OF ALL RESPONSES TO PHQ QUESTIONS 1-9: 0

## 2019-04-01 ASSESSMENT — ENCOUNTER SYMPTOMS
EYES NEGATIVE: 1
ALLERGIC/IMMUNOLOGIC NEGATIVE: 1
CONSTITUTIONAL NEGATIVE: 1
ENDOCRINE NEGATIVE: 1

## 2019-04-01 NOTE — PROGRESS NOTES
Chief Complaint:  Check rash.    HPI: This patient had called last week stating that he had psoriasis on his scalp and he wanted to be put on a pill to treat this.  He apparently has a nurse friend that has psoriasis and is on something, possibly methotrexate.  He has a rash on his scalp and has been using prescription cortisone, over-the-counter cortisone, and triple antibiotic to try to heal this up.  It does not seem to be getting better.    Past Medical History:   Diagnosis Date     Atherosclerotic heart disease of native coronary artery without angina pectoris     No Comments Provided     Atrioventricular block, complete (H)     No Comments Provided     Bladder neck obstruction     No Comments Provided     Chronic kidney disease, stage III (moderate) (H)     8/21/2014     Diverticulosis of large intestine without perforation or abscess without bleeding     3/9/2015     Essential (primary) hypertension     No Comments Provided     History of colonic polyps     2000,not adenomatous     Hyperlipidemia     No Comments Provided     Non-ST elevation (NSTEMI) myocardial infarction (H)     2/28/1999,with favorable post myocardial infarction thallium studies.     Occlusion and stenosis of unspecified carotid artery     2/2012,Moderate <60% bilateral on US       Past Surgical History:   Procedure Laterality Date     APPENDECTOMY OPEN  1953     COLONOSCOPY      2/4/2010,Numerous polyps-all hyperplastic.  Next due 2015.     COLONOSCOPY      3/9/15,F/U N/A age     CYSTOSCOPY      No Comments Provided     EXTRACAPSULAR CATARACT EXTRATION WITH INTRAOCULAR LENS IMPLANT Bilateral      HEART CATH, ANGIOPLASTY      2001,right coronary artery     HERNIA REPAIR Right 2016    LAPAROSCOPIC INCARCERATED INGUINAL HERNIA REPAIR     IMPLANT PACEMAKER      1/2005,Dual chamber pacemaker, [employer], model:  [id number]     REPLACE PACEMAKER GENERATOR      8/21/2013       No Known Allergies    Current Outpatient Medications   Medication Sig  Dispense Refill     amLODIPine (NORVASC) 2.5 MG tablet Take 1 tablet (2.5 mg) by mouth daily 90 tablet 3     aspirin (GOODSENSE ASPIRIN) 325 MG tablet Take 325 mg by mouth daily with food       atenolol (TENORMIN) 50 MG tablet Take 1 tablet (50 mg) by mouth daily 90 tablet 3     ketoconazole (NIZORAL) 2 % external shampoo Use 2-3 times weekly.  Leave in scalp for 5 minutes prior to rinsing 120 mL 3     losartan-hydrochlorothiazide (HYZAAR) 100-25 MG tablet Take 1 tablet by mouth daily 90 tablet 3     psyllium (TGT PSYLLIUM FIBER) 0.52 G capsule Take 1 capsule by mouth At Bedtime       simvastatin (ZOCOR) 80 MG tablet Take 0.5 tablets (40 mg) by mouth daily 45 tablet 3     tamsulosin (FLOMAX) 0.4 MG capsule Take 1 capsule (0.4 mg) by mouth daily with food 90 capsule 3     triamcinolone (KENALOG) 0.1 % external ointment Apply topically 3 times daily         Review of Systems   Constitutional: Negative.    Eyes: Negative.    Endocrine: Negative.    Allergic/Immunologic: Negative.        Physical Exam   Constitutional: He appears well-developed and well-nourished. No distress.   Skin: He is not diaphoretic.   He has 3 patches of what appears to be seborrheic dermatitis of the scalp.  The patches are erythematous with some slight elevation in lesions and scaling.  One is on the right temple area, one on the right occiput hairline and one on the left posterior scalp area   Nursing note and vitals reviewed.      Assessment:        ICD-10-CM    1. Seborrheic dermatitis of scalp L21.9 ketoconazole (NIZORAL) 2 % external shampoo       Plan:   Reviewed the diagnosis with the patient and I also provided him with a handout describing his condition, etc.  I want him to try Nizoral shampoo.  I encouraged him to use this correctly and to use it regularly for the next 3-4 weeks.  If not improved, I will send him to dermatology.

## 2019-04-01 NOTE — NURSING NOTE
"The patient is here today to be seen for possible psoriasis on his scalp.  Michelle Lerner LPN on 4/1/2019 at 2:59 PM  Chief Complaint   Patient presents with     Derm Problem       Initial /80 (BP Location: Right arm, Patient Position: Sitting, Cuff Size: Adult Regular)   Pulse 68   Temp 97.6  F (36.4  C) (Tympanic)   Resp 16   Wt 69.6 kg (153 lb 6.4 oz)   BMI 24.03 kg/m   Estimated body mass index is 24.03 kg/m  as calculated from the following:    Height as of 12/19/18: 1.702 m (5' 7\").    Weight as of this encounter: 69.6 kg (153 lb 6.4 oz).  Medication Reconciliation: complete    Michelle Lerner LPN    "

## 2019-07-08 ENCOUNTER — MYC MEDICAL ADVICE (OUTPATIENT)
Dept: INTERNAL MEDICINE | Facility: OTHER | Age: 84
End: 2019-07-08

## 2019-07-08 DIAGNOSIS — L21.9 SEBORRHEIC DERMATITIS OF SCALP: Primary | ICD-10-CM

## 2019-07-08 NOTE — TELEPHONE ENCOUNTER
The patient was contacted and notified of the information below. He states that he would like to be seen in [place]. Please the dermatology referral.  Michelle Lerner LPN on 7/8/2019 at 11:15 AM

## 2019-07-08 NOTE — TELEPHONE ENCOUNTER
Notify the patient that I would recommend a dermatology appointment.  He can be seen in [facility] or [place] the soonest.

## 2019-07-12 ENCOUNTER — MYC MEDICAL ADVICE (OUTPATIENT)
Dept: INTERNAL MEDICINE | Facility: OTHER | Age: 84
End: 2019-07-12

## 2019-07-12 NOTE — TELEPHONE ENCOUNTER
Contacted the patient and let him know that he can call his insurance company to see if the facility is covered or the dermatology office and have them check this for him. He was given the number to the dermatology office.  Michelle Lerner LPN on 7/12/2019 at 12:57 PM

## 2019-10-26 ENCOUNTER — HEALTH MAINTENANCE LETTER (OUTPATIENT)
Age: 84
End: 2019-10-26

## 2019-10-29 ENCOUNTER — ALLIED HEALTH/NURSE VISIT (OUTPATIENT)
Dept: FAMILY MEDICINE | Facility: OTHER | Age: 84
End: 2019-10-29
Attending: INTERNAL MEDICINE
Payer: MEDICARE

## 2019-10-29 DIAGNOSIS — Z23 NEED FOR PROPHYLACTIC VACCINATION AND INOCULATION AGAINST INFLUENZA: Primary | ICD-10-CM

## 2019-10-29 PROCEDURE — G0008 ADMIN INFLUENZA VIRUS VAC: HCPCS

## 2019-10-29 PROCEDURE — 90662 IIV NO PRSV INCREASED AG IM: CPT

## 2019-10-29 NOTE — PROGRESS NOTES
Influenza Vaccination Documentation  Verified patient's first and last name, and . Patient stated reason for visit today is to receive Flu vaccine. Patient denied any concerns with previous influenza vaccinations. Influenza vaccination screening questions answered (see IMMUNIZATIONS for answers). Allergies reviewed. Influenza vaccine order placed per standing order. VIS handout reviewed and given to patient to take home. Influenza high dose prepared and administered IM into right deltoid. Administration documented in IMMUNIZATIONS (see flowsheet and order for further information). Patient instructed to wait in lobby for 15 minutes post-injection and notify staff immediately of any reaction.      Yumiko MUNOZN, RN on 10/29/2019 at 3:59 PM

## 2019-12-07 DIAGNOSIS — I10 ESSENTIAL HYPERTENSION: ICD-10-CM

## 2019-12-10 RX ORDER — LOSARTAN POTASSIUM 100 MG/1
TABLET ORAL
Qty: 90 TABLET | Refills: 3 | OUTPATIENT
Start: 2019-12-10

## 2019-12-10 RX ORDER — ATENOLOL 50 MG/1
TABLET ORAL
Qty: 90 TABLET | Refills: 0 | Status: SHIPPED | OUTPATIENT
Start: 2019-12-10 | End: 2019-12-23

## 2019-12-10 NOTE — TELEPHONE ENCOUNTER
Prescription approved per [facility] Refill Protocol.  LOV: 12/19/18  Patient noted to have appointment scheduled for 12/23/19    Jacqui Saldaña RN on 12/10/2019 at 4:14 PM

## 2019-12-12 DIAGNOSIS — I10 ESSENTIAL HYPERTENSION: Primary | ICD-10-CM

## 2019-12-12 RX ORDER — LOSARTAN POTASSIUM 100 MG/1
100 TABLET ORAL DAILY
Qty: 90 TABLET | Refills: 3 | Status: SHIPPED | OUTPATIENT
Start: 2019-12-12 | End: 2019-12-23

## 2019-12-12 RX ORDER — HYDROCHLOROTHIAZIDE 25 MG/1
25 TABLET ORAL DAILY
Qty: 90 TABLET | Refills: 3 | Status: SHIPPED | OUTPATIENT
Start: 2019-12-12 | End: 2019-12-23

## 2019-12-12 NOTE — TELEPHONE ENCOUNTER
Patient calling for refill on Losartan   Per med list patient was on Losartan-hydrochlorothiazide 100-25mg     Due to shortage patient is on separate meds.    These are set up   Will route to Dr. Morris for review and consideration of reffill    Jacqui Saldaña RN on 12/12/2019 at 11:15 AM

## 2019-12-23 ENCOUNTER — OFFICE VISIT (OUTPATIENT)
Dept: INTERNAL MEDICINE | Facility: OTHER | Age: 84
End: 2019-12-23
Attending: INTERNAL MEDICINE
Payer: MEDICARE

## 2019-12-23 VITALS
BODY MASS INDEX: 24.64 KG/M2 | HEIGHT: 67 IN | DIASTOLIC BLOOD PRESSURE: 82 MMHG | RESPIRATION RATE: 18 BRPM | HEART RATE: 84 BPM | WEIGHT: 157 LBS | SYSTOLIC BLOOD PRESSURE: 130 MMHG | TEMPERATURE: 97.6 F

## 2019-12-23 DIAGNOSIS — E78.5 HYPERLIPIDEMIA, UNSPECIFIED HYPERLIPIDEMIA TYPE: ICD-10-CM

## 2019-12-23 DIAGNOSIS — I10 ESSENTIAL HYPERTENSION: Primary | ICD-10-CM

## 2019-12-23 DIAGNOSIS — N18.30 CHRONIC KIDNEY DISEASE (CKD), STAGE III (MODERATE) (H): ICD-10-CM

## 2019-12-23 DIAGNOSIS — I10 HYPERTENSION, UNSPECIFIED TYPE: ICD-10-CM

## 2019-12-23 DIAGNOSIS — R39.9 LOWER URINARY TRACT SYMPTOMS (LUTS): ICD-10-CM

## 2019-12-23 DIAGNOSIS — I25.10 ATHEROSCLEROSIS OF NATIVE CORONARY ARTERY OF NATIVE HEART WITHOUT ANGINA PECTORIS: ICD-10-CM

## 2019-12-23 DIAGNOSIS — I65.23 BILATERAL CAROTID ARTERY STENOSIS: ICD-10-CM

## 2019-12-23 LAB
ALBUMIN SERPL-MCNC: 4.6 G/DL (ref 3.5–5.7)
ALP SERPL-CCNC: 41 U/L (ref 34–104)
ALT SERPL W P-5'-P-CCNC: 13 U/L (ref 7–52)
ANION GAP SERPL CALCULATED.3IONS-SCNC: 10 MMOL/L (ref 3–14)
AST SERPL W P-5'-P-CCNC: 17 U/L (ref 13–39)
BILIRUB SERPL-MCNC: 0.7 MG/DL (ref 0.3–1)
BUN SERPL-MCNC: 24 MG/DL (ref 7–25)
CALCIUM SERPL-MCNC: 9.7 MG/DL (ref 8.6–10.3)
CHLORIDE SERPL-SCNC: 99 MMOL/L (ref 98–107)
CHOLEST SERPL-MCNC: 124 MG/DL
CO2 SERPL-SCNC: 27 MMOL/L (ref 21–31)
CREAT SERPL-MCNC: 1.62 MG/DL (ref 0.7–1.3)
GFR SERPL CREATININE-BSD FRML MDRD: 41 ML/MIN/{1.73_M2}
GLUCOSE SERPL-MCNC: 118 MG/DL (ref 70–105)
HDLC SERPL-MCNC: 52 MG/DL (ref 23–92)
LDLC SERPL CALC-MCNC: 50 MG/DL
NONHDLC SERPL-MCNC: 72 MG/DL
POTASSIUM SERPL-SCNC: 3.9 MMOL/L (ref 3.5–5.1)
PROT SERPL-MCNC: 7 G/DL (ref 6.4–8.9)
SODIUM SERPL-SCNC: 136 MMOL/L (ref 134–144)
TRIGL SERPL-MCNC: 109 MG/DL

## 2019-12-23 PROCEDURE — 80053 COMPREHEN METABOLIC PANEL: CPT | Mod: ZL | Performed by: INTERNAL MEDICINE

## 2019-12-23 PROCEDURE — 36415 COLL VENOUS BLD VENIPUNCTURE: CPT | Mod: ZL | Performed by: INTERNAL MEDICINE

## 2019-12-23 PROCEDURE — G0463 HOSPITAL OUTPT CLINIC VISIT: HCPCS

## 2019-12-23 PROCEDURE — 80061 LIPID PANEL: CPT | Mod: ZL | Performed by: INTERNAL MEDICINE

## 2019-12-23 PROCEDURE — 99215 OFFICE O/P EST HI 40 MIN: CPT | Performed by: INTERNAL MEDICINE

## 2019-12-23 RX ORDER — ATENOLOL 50 MG/1
50 TABLET ORAL DAILY
Qty: 90 TABLET | Refills: 3 | Status: SHIPPED | OUTPATIENT
Start: 2019-12-23 | End: 2021-01-13

## 2019-12-23 RX ORDER — TAMSULOSIN HYDROCHLORIDE 0.4 MG/1
0.4 CAPSULE ORAL
Qty: 90 CAPSULE | Refills: 3 | Status: SHIPPED | OUTPATIENT
Start: 2019-12-23 | End: 2021-01-13

## 2019-12-23 RX ORDER — SIMVASTATIN 80 MG
40 TABLET ORAL DAILY
Qty: 45 TABLET | Refills: 3 | Status: SHIPPED | OUTPATIENT
Start: 2019-12-23 | End: 2021-01-13

## 2019-12-23 RX ORDER — LOSARTAN POTASSIUM AND HYDROCHLOROTHIAZIDE 25; 100 MG/1; MG/1
1 TABLET ORAL DAILY
Qty: 90 TABLET | Refills: 3 | Status: SHIPPED | OUTPATIENT
Start: 2019-12-23 | End: 2020-12-17

## 2019-12-23 RX ORDER — AMLODIPINE BESYLATE 2.5 MG/1
2.5 TABLET ORAL DAILY
Qty: 90 TABLET | Refills: 3 | Status: SHIPPED | OUTPATIENT
Start: 2019-12-23 | End: 2020-12-28

## 2019-12-23 ASSESSMENT — ENCOUNTER SYMPTOMS
NUMBNESS: 0
ADENOPATHY: 0
ABDOMINAL DISTENTION: 0
FEVER: 0
DIAPHORESIS: 0
BACK PAIN: 0
CHILLS: 0
DIARRHEA: 0
BLOOD IN STOOL: 0
CONFUSION: 0
LIGHT-HEADEDNESS: 0
TREMORS: 0
NERVOUS/ANXIOUS: 0
WHEEZING: 0
DIZZINESS: 0
VOMITING: 0
HEMATURIA: 0
SINUS PRESSURE: 0
FLANK PAIN: 0
EYE PAIN: 0
VOICE CHANGE: 0
SPEECH DIFFICULTY: 0
FREQUENCY: 0
TROUBLE SWALLOWING: 0
FATIGUE: 0
NAUSEA: 0
NECK PAIN: 0
PALPITATIONS: 0
DIFFICULTY URINATING: 0
BRUISES/BLEEDS EASILY: 0
ABDOMINAL PAIN: 0
RHINORRHEA: 0
CHEST TIGHTNESS: 0
UNEXPECTED WEIGHT CHANGE: 0
WOUND: 0
COUGH: 0
SORE THROAT: 0
SEIZURES: 0
EYE REDNESS: 0
WEAKNESS: 0
AGITATION: 0
SINUS PAIN: 0
HEADACHES: 0
DYSURIA: 0
CONSTIPATION: 0
HALLUCINATIONS: 0
SHORTNESS OF BREATH: 0
NECK STIFFNESS: 0
JOINT SWELLING: 0
APPETITE CHANGE: 0
SLEEP DISTURBANCE: 0

## 2019-12-23 ASSESSMENT — MIFFLIN-ST. JEOR: SCORE: 1360.78

## 2019-12-23 NOTE — NURSING NOTE
"The patient is here today to be seen for a refill on his medications.  Michelle Lerner LPN on 12/23/2019 at 10:03 AM  Chief Complaint   Patient presents with     Recheck Medication       Initial /82 (BP Location: Right arm, Patient Position: Sitting, Cuff Size: Adult Regular)   Pulse 84   Temp 97.6  F (36.4  C) (Tympanic)   Resp 18   Ht 1.702 m (5' 7\")   Wt 71.2 kg (157 lb)   BMI 24.59 kg/m   Estimated body mass index is 24.59 kg/m  as calculated from the following:    Height as of this encounter: 1.702 m (5' 7\").    Weight as of this encounter: 71.2 kg (157 lb).  Medication Reconciliation: complete    Michelle Lerner LPN    "

## 2019-12-23 NOTE — LETTER
December 23, 2019      Alvaro ZACHARIAH Fletcherf .  [address] [place] [address]        Dear Alvaro,    Below are the results of your recent labs:    Results for orders placed or performed in visit on 12/23/19   Lipid Panel     Status: None   Result Value Ref Range    Cholesterol 124 <200 mg/dL    Triglycerides 109 <150 mg/dL    HDL Cholesterol 52 23 - 92 mg/dL    LDL Cholesterol Calculated 50 <100 mg/dL    Non HDL Cholesterol 72 <130 mg/dL   Comprehensive Metabolic Panel     Status: Abnormal   Result Value Ref Range    Sodium 136 134 - 144 mmol/L    Potassium 3.9 3.5 - 5.1 mmol/L    Chloride 99 98 - 107 mmol/L    Carbon Dioxide 27 21 - 31 mmol/L    Anion Gap 10 3 - 14 mmol/L    Glucose 118 (H) 70 - 105 mg/dL    Urea Nitrogen 24 7 - 25 mg/dL    Creatinine 1.62 (H) 0.70 - 1.30 mg/dL    GFR Estimate 41 (L) >60 mL/min/[1.73_m2]    GFR Estimate If Black 49 (L) >60 mL/min/[1.73_m2]    Calcium 9.7 8.6 - 10.3 mg/dL    Bilirubin Total 0.7 0.3 - 1.0 mg/dL    Albumin 4.6 3.5 - 5.7 g/dL    Protein Total 7.0 6.4 - 8.9 g/dL    Alkaline Phosphatase 41 34 - 104 U/L    ALT 13 7 - 52 U/L    AST 17 13 - 39 U/L        Your blood tests look fine overall.  You do have a slight elevation in your glucose or sugar level and you also have some weakness of your kidneys.  These remain stable.  Everything else looks fine.  If you have any questions about your results, feel free to contact me.    Sincerely,        Jose Raul Morris MD  Internal Medicine  [facility]

## 2019-12-23 NOTE — PROGRESS NOTES
Chief Complaint:  This patient is here for a comprehensive review of their multiple medical problems, renewal of medications and update on necessary health maintenance issues.      HPI: This patient is here today for complete evaluation and a review of his chronic medical problems.  He has a history of atherosclerotic vascular disease with previous coronary intervention and a history of nonobstructive bilateral carotid artery disease.  He is treated appropriately with moderate intensity statin therapy as well as aspirin therapy.  He is totally asymptomatic.  He also has a history of hypertension.  He is on multidrug therapy for control of his blood pressure.  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    He has lower urinary tract symptoms.  He does take Flomax on a regular basis and that does seem to provide adequate relief of his symptoms.  Recently had some significant dermatology issues.  He was sent to the dermatologist who gave him cortisone drops to use and that cleared his scalp up quite nicely.  He has no other major complaints or concerns other than spot on his neck that he would like checked, apparently his nunez was concerned about it.    He is thinking of moving.  He has his property for sale.  He might live in town if he can find a place to rent otherwise he might be moving back to the [place] area where his children are.      Medications are reconciled.  Past medical history, past surgical history, family history and social histories are reviewed and updated.    Past Medical History:   Diagnosis Date     Atherosclerotic heart disease of native coronary artery without angina pectoris     No Comments Provided     Atrioventricular block, complete (H)     No Comments Provided     Bladder neck obstruction     No Comments Provided     Chronic kidney disease, stage III (moderate) (H)     8/21/2014     Diverticulosis of large intestine without perforation or  abscess without bleeding     3/9/2015     Essential (primary) hypertension     No Comments Provided     History of colonic polyps     2000,not adenomatous     Hyperlipidemia     No Comments Provided     Non-ST elevation (NSTEMI) myocardial infarction (H)     2/28/1999,with favorable post myocardial infarction thallium studies.     Occlusion and stenosis of unspecified carotid artery     2/2012,Moderate <60% bilateral on US       Past Surgical History:   Procedure Laterality Date     APPENDECTOMY OPEN  1953     COLONOSCOPY      2/4/2010,Numerous polyps-all hyperplastic.  Next due 2015.     COLONOSCOPY      3/9/15,F/U N/A age     CYSTOSCOPY      No Comments Provided     EXTRACAPSULAR CATARACT EXTRATION WITH INTRAOCULAR LENS IMPLANT Bilateral      HEART CATH, ANGIOPLASTY      2001,right coronary artery     HERNIA REPAIR Right 2016    LAPAROSCOPIC INCARCERATED INGUINAL HERNIA REPAIR     IMPLANT PACEMAKER      1/2005,Dual chamber pacemaker, [employer], model:  E2ER01     REPLACE PACEMAKER GENERATOR      8/21/2013       Current Outpatient Medications   Medication Sig Dispense Refill     amLODIPine (NORVASC) 2.5 MG tablet Take 1 tablet (2.5 mg) by mouth daily 90 tablet 3     aspirin (GOODSENSE ASPIRIN) 325 MG tablet Take 325 mg by mouth daily with food       atenolol (TENORMIN) 50 MG tablet TAKE 1 TABLET BY MOUTH ONCE DAILY 90 tablet 0     losartan-hydrochlorothiazide (HYZAAR) 100-25 MG tablet Take 1 tablet by mouth daily 90 tablet 3     psyllium (TGT PSYLLIUM FIBER) 0.52 G capsule Take 1 capsule by mouth At Bedtime       simvastatin (ZOCOR) 80 MG tablet Take 0.5 tablets (40 mg) by mouth daily 45 tablet 3     tamsulosin (FLOMAX) 0.4 MG capsule Take 1 capsule (0.4 mg) by mouth daily with food 90 capsule 3       No Known Allergies    Family History   Problem Relation Age of Onset     Other - See Comments Mother         Old age     Heart Disease Father      Other - See Comments Father         Stroke     Other - See Comments  Other         severe COPD     Cancer Brother         Lung       Social History     Socioeconomic History     Marital status:      Spouse name:      Number of children: Not on file     Years of education: Not on file     Highest education level: Not on file   Occupational History     Not on file   Social Needs     Financial resource strain: Not on file     Food insecurity:     Worry: Not on file     Inability: Not on file     Transportation needs:     Medical: Not on file     Non-medical: Not on file   Tobacco Use     Smoking status: Former Smoker     Types: Cigarettes     Last attempt to quit: 1999     Years since quittin.9     Smokeless tobacco: Never Used   Substance and Sexual Activity     Alcohol use: No     Alcohol/week: 0.0 standard drinks     Drug use: No     Comment: Drug use: No     Sexual activity: Not Currently   Lifestyle     Physical activity:     Days per week: Not on file     Minutes per session: Not on file     Stress: Not on file   Relationships     Social connections:     Talks on phone: Not on file     Gets together: Not on file     Attends Sabianist service: Not on file     Active member of club or organization: Not on file     Attends meetings of clubs or organizations: Not on file     Relationship status: Not on file     Intimate partner violence:     Fear of current or ex partner: Not on file     Emotionally abused: Not on file     Physically abused: Not on file     Forced sexual activity: Not on file   Other Topics Concern     Parent/sibling w/ CABG, MI or angioplasty before 65F 55M? Not Asked   Social History Narrative    Was  to twice; second wife  2016 after a long mederos with cancer; she was admitted to hospice early 2016.  Retired from [employer].  Lives in [place].       Review of Systems   Constitutional: Negative for appetite change, chills, diaphoresis, fatigue, fever and unexpected weight change.   HENT: Negative for ear pain,  rhinorrhea, sinus pressure, sinus pain, sore throat, trouble swallowing and voice change.    Eyes: Negative for pain, redness and visual disturbance.   Respiratory: Negative for cough, chest tightness, shortness of breath and wheezing.    Cardiovascular: Negative for chest pain, palpitations and leg swelling.   Gastrointestinal: Negative for abdominal distention, abdominal pain, blood in stool, constipation, diarrhea, nausea and vomiting.   Endocrine: Negative for cold intolerance and heat intolerance.   Genitourinary: Negative for difficulty urinating, dysuria, flank pain, frequency and hematuria.   Musculoskeletal: Negative for back pain, joint swelling, neck pain and neck stiffness.   Skin: Negative for rash and wound.   Allergic/Immunologic: Negative for immunocompromised state.   Neurological: Negative for dizziness, tremors, seizures, syncope, speech difficulty, weakness, light-headedness, numbness and headaches.   Hematological: Negative for adenopathy. Does not bruise/bleed easily.   Psychiatric/Behavioral: Negative for agitation, behavioral problems, confusion, hallucinations and sleep disturbance. The patient is not nervous/anxious.        Physical Exam  Vitals signs and nursing note reviewed.   Constitutional:       General: He is not in acute distress.     Appearance: He is well-developed. He is not diaphoretic.   HENT:      Head: Normocephalic.      Right Ear: Tympanic membrane, ear canal and external ear normal.      Left Ear: Tympanic membrane, ear canal and external ear normal.      Nose: Nose normal.      Mouth/Throat:      Pharynx: No oropharyngeal exudate.   Eyes:      Conjunctiva/sclera: Conjunctivae normal.      Pupils: Pupils are equal, round, and reactive to light.   Neck:      Musculoskeletal: Normal range of motion and neck supple.      Thyroid: No thyroid mass or thyromegaly.      Vascular: Decreased carotid pulses. Carotid bruit present. No JVD.      Trachea: No tracheal deviation.    Cardiovascular:      Rate and Rhythm: Normal rate and regular rhythm.      Heart sounds: Normal heart sounds. No murmur. No friction rub. No gallop.    Pulmonary:      Effort: Pulmonary effort is normal. No respiratory distress.      Breath sounds: Normal breath sounds. No stridor. No decreased breath sounds, wheezing, rhonchi or rales.   Abdominal:      General: Bowel sounds are normal. There is no distension.      Palpations: Abdomen is soft. There is no mass.      Tenderness: There is no abdominal tenderness. There is no guarding or rebound.      Hernia: No hernia is present.   Genitourinary:     Penis: Normal.       Scrotum/Testes: Normal.      Prostate: Enlarged.      Rectum: Normal.      Comments: 3+ prostate, no nodularity  Musculoskeletal: Normal range of motion.      Right lower leg: Edema present.      Left lower leg: Edema present.      Comments: 1+ pretibial edema   Lymphadenopathy:      Cervical: No cervical adenopathy.   Skin:     General: Skin is warm and dry.      Findings: No rash.   Neurological:      Mental Status: He is alert and oriented to person, place, and time.      Cranial Nerves: No cranial nerve deficit.      Sensory: No sensory deficit.      Motor: No abnormal muscle tone.      Coordination: Coordination normal.      Deep Tendon Reflexes: Reflexes normal.         Assessment:      ICD-10-CM    1. Essential hypertension I10    2. Hyperlipidemia, unspecified hyperlipidemia type E78.5    3. Bilateral carotid artery stenosis I65.23    4. Atherosclerosis of native coronary artery of native heart without angina pectoris I25.10    5. Chronic kidney disease (CKD), stage III (moderate) (H) N18.3    6. Lower urinary tract symptoms (LUTS) R39.9         Plan: Overall he appears to be doing well.  I did not see any skin lesions that look particularly suspicious today but we will continue to monitor these.  Medications will continue without change, refills were done as needed, hopefully the pharmacy  has the Hyzaar combination pill at this time.  Complete lab drawn and pending, I will send him a letter with the results.  I also recommended that we get another carotid ultrasound done to follow-up on his carotid artery disease, he is in agreement and I will let him know the results of that as well.  Assuming all goes well, follow-up on an annual basis.

## 2020-01-13 ENCOUNTER — HOSPITAL ENCOUNTER (OUTPATIENT)
Dept: ULTRASOUND IMAGING | Facility: OTHER | Age: 85
Discharge: HOME | End: 2020-01-13
Attending: INTERNAL MEDICINE | Admitting: INTERNAL MEDICINE
Payer: MEDICARE

## 2020-01-13 DIAGNOSIS — I65.23 BILATERAL CAROTID ARTERY STENOSIS: ICD-10-CM

## 2020-01-13 PROCEDURE — 93880 EXTRACRANIAL BILAT STUDY: CPT

## 2020-01-15 ENCOUNTER — MYC MEDICAL ADVICE (OUTPATIENT)
Dept: INTERNAL MEDICINE | Facility: OTHER | Age: 85
End: 2020-01-15

## 2020-01-15 NOTE — TELEPHONE ENCOUNTER
As per my letter, the blockage on the right is worse.  It is over 70% at this time so we need to do the CAT scan to evaluate further.

## 2020-01-16 DIAGNOSIS — I65.23 BILATERAL CAROTID ARTERY STENOSIS: Primary | ICD-10-CM

## 2020-01-16 NOTE — TELEPHONE ENCOUNTER
Notify the patient that I have ordered the CT scan and he should schedule a follow-up visit with me the following day to go over the results.

## 2020-01-16 NOTE — TELEPHONE ENCOUNTER
The patient was contacted and given the information below.  Michelle Lerner LPN on 1/16/2020 at 9:19 AM

## 2020-01-20 ENCOUNTER — HOSPITAL ENCOUNTER (OUTPATIENT)
Dept: CT IMAGING | Facility: OTHER | Age: 85
Discharge: HOME | End: 2020-01-20
Attending: INTERNAL MEDICINE | Admitting: INTERNAL MEDICINE
Payer: MEDICARE

## 2020-01-20 DIAGNOSIS — I65.23 BILATERAL CAROTID ARTERY STENOSIS: ICD-10-CM

## 2020-01-20 LAB
CREAT SERPL-MCNC: 1.64 MG/DL (ref 0.7–1.3)
GFR SERPL CREATININE-BSD FRML MDRD: 40 ML/MIN/{1.73_M2}

## 2020-01-20 PROCEDURE — 70498 CT ANGIOGRAPHY NECK: CPT

## 2020-01-20 PROCEDURE — 82565 ASSAY OF CREATININE: CPT | Mod: ZL | Performed by: INTERNAL MEDICINE

## 2020-01-20 PROCEDURE — 36415 COLL VENOUS BLD VENIPUNCTURE: CPT | Mod: ZL | Performed by: INTERNAL MEDICINE

## 2020-01-20 PROCEDURE — 25500064 ZZH RX 255 OP 636: Performed by: INTERNAL MEDICINE

## 2020-01-20 RX ORDER — IODIXANOL 320 MG/ML
100 INJECTION, SOLUTION INTRAVASCULAR ONCE
Status: COMPLETED | OUTPATIENT
Start: 2020-01-20 | End: 2020-01-20

## 2020-01-20 RX ADMIN — IODIXANOL 100 ML: 320 INJECTION, SOLUTION INTRAVASCULAR at 16:15

## 2020-01-20 NOTE — PROGRESS NOTES
IV Contrast- Discharge Instructions After Your CT Scan      The IV contrast you received today will be filtered from your bloodstream by your kidneys during the next 24 hours and pass from the body in urine.  You will not be aware of this process and your urine will not change in color.  To help this process you should drink at least 4 additional glasses of water or juice today.  This reduces stress on your kidneys.    Most contrast reactions are immediate.  Should you develop symptoms of concern after discharge, contact the department at the number below.  After hours you should contact your personal physician.  If you develop breathing distress or wheezing, call 911.      1.  Has the patient had a previous reaction to IV contrast? no    2.  Does the patient have kidney disease? yes    3.  Is the patient on dialysis? no    If YES to any of these questions, exam will be reviewed with a Radiologist before administering contrast.    Discussed necessity of pushing fluids for the next 24 hours, pt fully understood instructions before leaving.

## 2020-01-24 ENCOUNTER — OFFICE VISIT (OUTPATIENT)
Dept: INTERNAL MEDICINE | Facility: OTHER | Age: 85
End: 2020-01-24
Attending: INTERNAL MEDICINE
Payer: MEDICARE

## 2020-01-24 VITALS
SYSTOLIC BLOOD PRESSURE: 132 MMHG | HEART RATE: 76 BPM | RESPIRATION RATE: 18 BRPM | DIASTOLIC BLOOD PRESSURE: 80 MMHG | BODY MASS INDEX: 24.97 KG/M2 | TEMPERATURE: 97.4 F | WEIGHT: 159.4 LBS

## 2020-01-24 DIAGNOSIS — I65.23 BILATERAL CAROTID ARTERY STENOSIS: Primary | ICD-10-CM

## 2020-01-24 DIAGNOSIS — H61.92 LESION OF LEFT EARLOBE: ICD-10-CM

## 2020-01-24 PROCEDURE — G0463 HOSPITAL OUTPT CLINIC VISIT: HCPCS | Mod: 25

## 2020-01-24 PROCEDURE — G0463 HOSPITAL OUTPT CLINIC VISIT: HCPCS

## 2020-01-24 PROCEDURE — 99213 OFFICE O/P EST LOW 20 MIN: CPT | Mod: 25 | Performed by: INTERNAL MEDICINE

## 2020-01-24 PROCEDURE — 17000 DESTRUCT PREMALG LESION: CPT | Performed by: INTERNAL MEDICINE

## 2020-01-24 ASSESSMENT — ENCOUNTER SYMPTOMS
CONSTITUTIONAL NEGATIVE: 1
ALLERGIC/IMMUNOLOGIC NEGATIVE: 1
EYES NEGATIVE: 1
ENDOCRINE NEGATIVE: 1

## 2020-01-24 NOTE — NURSING NOTE
"The patient is here today to be seen for a follow up on a recent CT scan.  Michelle Lerner LPN on 1/24/2020 at 1:18 PM  Chief Complaint   Patient presents with     RECHECK       Initial /80 (BP Location: Right arm, Patient Position: Sitting, Cuff Size: Adult Large)   Pulse 76   Temp 97.4  F (36.3  C) (Tympanic)   Resp 18   Wt 72.3 kg (159 lb 6.4 oz)   BMI 24.97 kg/m   Estimated body mass index is 24.97 kg/m  as calculated from the following:    Height as of 12/23/19: 1.702 m (5' 7\").    Weight as of this encounter: 72.3 kg (159 lb 6.4 oz).  Medication Reconciliation: complete    Michelle Lerner LPN    "

## 2020-01-24 NOTE — PROGRESS NOTES
Chief Complaint:  F/U on carotid artery disease.    HPI: He is here to go over his carotid studies.  He has significant asymptomatic carotid artery stenosis, right greater than left.  He is treated appropriately for vascular disease.  His carotid obstruction has been progressing slowly.    He also has a sore inside his left ear that is bothersome.  He would like to have that checked.  It does not seem to heal up.    Past Medical History:   Diagnosis Date     Atherosclerotic heart disease of native coronary artery without angina pectoris     No Comments Provided     Atrioventricular block, complete (H)     No Comments Provided     Bladder neck obstruction     No Comments Provided     Chronic kidney disease, stage III (moderate) (H)     8/21/2014     Diverticulosis of large intestine without perforation or abscess without bleeding     3/9/2015     Essential (primary) hypertension     No Comments Provided     History of colonic polyps     2000,not adenomatous     Hyperlipidemia     No Comments Provided     Non-ST elevation (NSTEMI) myocardial infarction (H)     2/28/1999,with favorable post myocardial infarction thallium studies.     Occlusion and stenosis of unspecified carotid artery     2/2012,Moderate <60% bilateral on US       Past Surgical History:   Procedure Laterality Date     APPENDECTOMY OPEN  1953     COLONOSCOPY      2/4/2010,Numerous polyps-all hyperplastic.  Next due 2015.     COLONOSCOPY      3/9/15,F/U N/A age     CYSTOSCOPY      No Comments Provided     EXTRACAPSULAR CATARACT EXTRATION WITH INTRAOCULAR LENS IMPLANT Bilateral      HEART CATH, ANGIOPLASTY      2001,right coronary artery     HERNIA REPAIR Right 2016    LAPAROSCOPIC INCARCERATED INGUINAL HERNIA REPAIR     IMPLANT PACEMAKER      1/2005,Dual chamber pacemaker, [employer], model:  E2ER01     REPLACE PACEMAKER GENERATOR      8/21/2013       No Known Allergies    Current Outpatient Medications   Medication Sig Dispense Refill     amLODIPine  (NORVASC) 2.5 MG tablet Take 1 tablet (2.5 mg) by mouth daily 90 tablet 3     aspirin (GOODSENSE ASPIRIN) 325 MG tablet Take 325 mg by mouth daily with food       atenolol (TENORMIN) 50 MG tablet Take 1 tablet (50 mg) by mouth daily 90 tablet 3     losartan-hydrochlorothiazide (HYZAAR) 100-25 MG tablet Take 1 tablet by mouth daily 90 tablet 3     psyllium (TGT PSYLLIUM FIBER) 0.52 G capsule Take 1 capsule by mouth At Bedtime       simvastatin (ZOCOR) 80 MG tablet Take 0.5 tablets (40 mg) by mouth daily 45 tablet 3     tamsulosin (FLOMAX) 0.4 MG capsule Take 1 capsule (0.4 mg) by mouth daily with food 90 capsule 3       Review of Systems   Constitutional: Negative.    Eyes: Negative.    Endocrine: Negative.    Allergic/Immunologic: Negative.        Physical Exam  Vitals signs and nursing note reviewed.   Constitutional:       General: He is not in acute distress.     Appearance: Normal appearance. He is not ill-appearing, toxic-appearing or diaphoretic.   HENT:      Ears:     Neurological:      Mental Status: He is alert.         Assessment:        ICD-10-CM    1. Bilateral carotid artery stenosis I65.23 VASCULAR SURGERY REFERRAL       Plan: I reviewed his CT scans with him today showing his significant carotid artery obstruction.  Again, this is asymptomatic but has been slowly progressing.  I recommended that we get a vascular surgery consultation to see if they would recommend intervention at this time or continued monitoring.  No change in medication.  Follow-up on the ear lesion as needed.

## 2020-02-12 ENCOUNTER — TRANSFERRED RECORDS (OUTPATIENT)
Dept: HEALTH INFORMATION MANAGEMENT | Facility: OTHER | Age: 85
End: 2020-02-12

## 2020-03-15 ENCOUNTER — HEALTH MAINTENANCE LETTER (OUTPATIENT)
Age: 85
End: 2020-03-15

## 2020-04-13 DIAGNOSIS — L21.9 SEBORRHEIC DERMATITIS OF SCALP: Primary | ICD-10-CM

## 2020-04-13 RX ORDER — TRIAMCINOLONE ACETONIDE 1 MG/G
OINTMENT TOPICAL 3 TIMES DAILY
Qty: 80 G | Refills: 2 | Status: SHIPPED | OUTPATIENT
Start: 2020-04-13 | End: 2024-02-16

## 2020-04-13 NOTE — TELEPHONE ENCOUNTER
Requests refill of Triamcinolone 0.1% oint -  Apply topically to affected area(S) three times daily  QTY:  80   RX WRITTEN:  08/15/13  LAST FILL DATE:  08/15/13    Not on med list

## 2020-04-13 NOTE — TELEPHONE ENCOUNTER
Routing refill request to provider for review/approval because:  Drug not active on patient's medication list    LOV: 1/24/2020    Called patient and patient states he has used this for years for his itchy ears.    Jacqui Saldaña RN on 4/13/2020 at 3:29 PM

## 2020-04-15 DIAGNOSIS — L21.9 SEBORRHEIC DERMATITIS OF SCALP: ICD-10-CM

## 2020-04-16 RX ORDER — KETOCONAZOLE 20 MG/ML
SHAMPOO TOPICAL
Qty: 120 ML | Refills: 0 | Status: SHIPPED | OUTPATIENT
Start: 2020-04-16 | End: 2023-02-15

## 2020-04-16 NOTE — TELEPHONE ENCOUNTER
Routing refill request to provider for review/approval because:    Drug not active on patient's medication list    LOV: 1/24/2020    Jacqui Saldaña RN on 4/16/2020 at 9:10 AM

## 2020-12-13 ENCOUNTER — MYC MEDICAL ADVICE (OUTPATIENT)
Dept: INTERNAL MEDICINE | Facility: OTHER | Age: 85
End: 2020-12-13

## 2020-12-13 DIAGNOSIS — I10 ESSENTIAL HYPERTENSION: Primary | ICD-10-CM

## 2020-12-14 ENCOUNTER — MYC MEDICAL ADVICE (OUTPATIENT)
Dept: INTERNAL MEDICINE | Facility: OTHER | Age: 85
End: 2020-12-14

## 2020-12-14 RX ORDER — LOSARTAN POTASSIUM 100 MG/1
TABLET ORAL
Qty: 90 TABLET | Refills: 0 | Status: SHIPPED | OUTPATIENT
Start: 2020-12-14 | End: 2021-03-17

## 2020-12-14 NOTE — TELEPHONE ENCOUNTER
[id number] sent Rx request for the following:      Requested Prescriptions   Pending Prescriptions Disp Refills   losartan (COZAAR) 100 MG tablet [Pharmacy Med Name: Losartan Potassium 100 MG Oral Tablet] 90 tablet 0    Sig: Take 1 tablet by mouth once daily   Last Office Visit:             1/24/20   Future Office visit:           none  Routing refill request to provider for review/approval because:   Angiotensin-II Receptors Failed - 12/14/2020  2:27 PM       Failed - Medication is active on med list       Failed - Normal serum creatinine on file in past 12 months     Last Prescription:  losartan-hydrochlorothiazide (HYZAAR) 100-25 MG tablet 90 tablet 3 12/23/2019  No   Sig - Route: Take 1 tablet by mouth daily - Oral     Unable to complete prescription refill per RN Medication Refill Policy. Marti Garrido RN .............. 12/14/2020  3:06 PM

## 2020-12-16 ENCOUNTER — OFFICE VISIT (OUTPATIENT)
Dept: INTERNAL MEDICINE | Facility: OTHER | Age: 85
End: 2020-12-16
Attending: INTERNAL MEDICINE
Payer: MEDICARE

## 2020-12-16 VITALS
TEMPERATURE: 96.6 F | WEIGHT: 154 LBS | SYSTOLIC BLOOD PRESSURE: 132 MMHG | DIASTOLIC BLOOD PRESSURE: 80 MMHG | RESPIRATION RATE: 16 BRPM | HEART RATE: 84 BPM | BODY MASS INDEX: 24.12 KG/M2

## 2020-12-16 DIAGNOSIS — H90.3 BILATERAL SENSORINEURAL HEARING LOSS: Primary | ICD-10-CM

## 2020-12-16 DIAGNOSIS — H91.93 DECREASED HEARING OF BOTH EARS: Primary | ICD-10-CM

## 2020-12-16 PROCEDURE — 99213 OFFICE O/P EST LOW 20 MIN: CPT | Performed by: INTERNAL MEDICINE

## 2020-12-16 PROCEDURE — G0463 HOSPITAL OUTPT CLINIC VISIT: HCPCS

## 2020-12-16 ASSESSMENT — ENCOUNTER SYMPTOMS
EYES NEGATIVE: 1
ENDOCRINE NEGATIVE: 1
ALLERGIC/IMMUNOLOGIC NEGATIVE: 1
CONSTITUTIONAL NEGATIVE: 1

## 2020-12-16 ASSESSMENT — PAIN SCALES - GENERAL: PAINLEVEL: NO PAIN (0)

## 2020-12-16 NOTE — NURSING NOTE
"Chief Complaint   Patient presents with     Ear Problem       Initial /80 (BP Location: Right arm, Patient Position: Sitting, Cuff Size: Adult Regular)   Pulse 84   Temp 96.6  F (35.9  C) (Tympanic)   Resp 16   Wt 69.9 kg (154 lb)   BMI 24.12 kg/m   Estimated body mass index is 24.12 kg/m  as calculated from the following:    Height as of 12/23/19: 1.702 m (5' 7\").    Weight as of this encounter: 69.9 kg (154 lb).  Medication Reconciliation: complete    Olena Hinotn LPN  "

## 2020-12-16 NOTE — PROGRESS NOTES
Chief Complaint:  Plugged ear.    HPI: He is in today to have his ears checked.  He has hearing aids but his left ear does not seem to be normal.  He thinks there is wax in his ear.  He would like to have that checked.  He has a few other questions about some issues including immunizations and the Covid vaccine, etc.  Those were reviewed with him today.    Past Medical History:   Diagnosis Date     Atherosclerotic heart disease of native coronary artery without angina pectoris     No Comments Provided     Atrioventricular block, complete (H)     No Comments Provided     Bladder neck obstruction     No Comments Provided     Chronic kidney disease, stage III (moderate)     8/21/2014     Diverticulosis of large intestine without perforation or abscess without bleeding     3/9/2015     Essential (primary) hypertension     No Comments Provided     History of colonic polyps     2000,not adenomatous     Hyperlipidemia     No Comments Provided     Non-ST elevation (NSTEMI) myocardial infarction (H)     2/28/1999,with favorable post myocardial infarction thallium studies.     Occlusion and stenosis of unspecified carotid artery     2/2012,Moderate <60% bilateral on US       Past Surgical History:   Procedure Laterality Date     APPENDECTOMY OPEN  1953     COLONOSCOPY      2/4/2010,Numerous polyps-all hyperplastic.  Next due 2015.     COLONOSCOPY      3/9/15,F/U N/A age     CYSTOSCOPY      No Comments Provided     EXTRACAPSULAR CATARACT EXTRATION WITH INTRAOCULAR LENS IMPLANT Bilateral      HEART CATH, ANGIOPLASTY      2001,right coronary artery     HERNIA REPAIR Right 2016    LAPAROSCOPIC INCARCERATED INGUINAL HERNIA REPAIR     IMPLANT PACEMAKER      1/2005,Dual chamber pacemaker, [employer], model:  E2ER01     REPLACE PACEMAKER GENERATOR      8/21/2013       No Known Allergies    Current Outpatient Medications   Medication Sig Dispense Refill     amLODIPine (NORVASC) 2.5 MG tablet Take 1 tablet (2.5 mg) by mouth daily 90  tablet 3     aspirin (GOODSENSE ASPIRIN) 325 MG tablet Take 325 mg by mouth daily with food       atenolol (TENORMIN) 50 MG tablet Take 1 tablet (50 mg) by mouth daily 90 tablet 3     ketoconazole (NIZORAL) 2 % external shampoo USE 2-3 TIMES WEEKLY. LEAVE IN SCALP FOR 5 MINUTES PRIOR TO RINSING. 120 mL 0     losartan (COZAAR) 100 MG tablet Take 1 tablet by mouth once daily 90 tablet 0     losartan-hydrochlorothiazide (HYZAAR) 100-25 MG tablet Take 1 tablet by mouth daily 90 tablet 3     psyllium (TGT PSYLLIUM FIBER) 0.52 G capsule Take 1 capsule by mouth At Bedtime       simvastatin (ZOCOR) 80 MG tablet Take 0.5 tablets (40 mg) by mouth daily 45 tablet 3     tamsulosin (FLOMAX) 0.4 MG capsule Take 1 capsule (0.4 mg) by mouth daily with food 90 capsule 3     triamcinolone (KENALOG) 0.1 % external ointment Apply topically 3 times daily 80 g 2       Review of Systems   Constitutional: Negative.    Eyes: Negative.    Endocrine: Negative.    Allergic/Immunologic: Negative.        Physical Exam  Vitals signs and nursing note reviewed.   Constitutional:       General: He is not in acute distress.     Appearance: Normal appearance. He is not ill-appearing, toxic-appearing or diaphoretic.   HENT:      Right Ear: Tympanic membrane, ear canal and external ear normal.      Left Ear: Ear canal normal.      Ears:      Comments: Left TM dull  Neurological:      Mental Status: He is alert.         Assessment:        ICD-10-CM    1. Bilateral sensorineural hearing loss  H90.3 OTOLARYNGOLOGY REFERRAL       Plan: He has baseline hearing loss but he is significantly impaired currently.  He may have a serous otitis on the left.  There is no significant cerumen.  I recommended ENT consultation and he is in agreement.  Complete evaluation with me in 1 month.

## 2020-12-17 ENCOUNTER — OFFICE VISIT (OUTPATIENT)
Dept: AUDIOLOGY | Facility: OTHER | Age: 85
End: 2020-12-17
Attending: AUDIOLOGIST
Payer: MEDICARE

## 2020-12-17 ENCOUNTER — OFFICE VISIT (OUTPATIENT)
Dept: OTOLARYNGOLOGY | Facility: OTHER | Age: 85
End: 2020-12-17
Attending: AUDIOLOGIST
Payer: MEDICARE

## 2020-12-17 VITALS
TEMPERATURE: 97.6 F | OXYGEN SATURATION: 96 % | HEART RATE: 81 BPM | RESPIRATION RATE: 16 BRPM | HEIGHT: 67 IN | BODY MASS INDEX: 24.17 KG/M2 | SYSTOLIC BLOOD PRESSURE: 126 MMHG | WEIGHT: 154 LBS | DIASTOLIC BLOOD PRESSURE: 64 MMHG

## 2020-12-17 DIAGNOSIS — H69.92 DYSFUNCTION OF LEFT EUSTACHIAN TUBE: Primary | ICD-10-CM

## 2020-12-17 DIAGNOSIS — J34.2 DNS (DEVIATED NASAL SEPTUM): ICD-10-CM

## 2020-12-17 DIAGNOSIS — H91.93 DECREASED HEARING OF BOTH EARS: ICD-10-CM

## 2020-12-17 DIAGNOSIS — J39.2 CYST OF NASOPHARYNX: ICD-10-CM

## 2020-12-17 DIAGNOSIS — Z96.22 S/P MYRINGOTOMY WITH INSERTION OF TUBE: ICD-10-CM

## 2020-12-17 DIAGNOSIS — H90.A32 MIXED CONDUCTIVE AND SENSORINEURAL HEARING LOSS OF LEFT EAR WITH RESTRICTED HEARING OF RIGHT EAR: ICD-10-CM

## 2020-12-17 DIAGNOSIS — H90.A21 SENSORINEURAL HEARING LOSS (SNHL) OF RIGHT EAR WITH RESTRICTED HEARING OF LEFT EAR: ICD-10-CM

## 2020-12-17 DIAGNOSIS — Z87.891 PERSONAL HISTORY OF TOBACCO USE, PRESENTING HAZARDS TO HEALTH: ICD-10-CM

## 2020-12-17 DIAGNOSIS — H65.492 COME (CHRONIC OTITIS MEDIA WITH EFFUSION), LEFT: Primary | ICD-10-CM

## 2020-12-17 DIAGNOSIS — H69.92 DYSFUNCTION OF LEFT EUSTACHIAN TUBE: ICD-10-CM

## 2020-12-17 PROCEDURE — 92511 NASOPHARYNGOSCOPY: CPT | Performed by: PHYSICIAN ASSISTANT

## 2020-12-17 PROCEDURE — 92550 TYMPANOMETRY & REFLEX THRESH: CPT | Performed by: AUDIOLOGIST

## 2020-12-17 PROCEDURE — 92504 EAR MICROSCOPY EXAMINATION: CPT | Performed by: PHYSICIAN ASSISTANT

## 2020-12-17 PROCEDURE — 92557 COMPREHENSIVE HEARING TEST: CPT | Performed by: AUDIOLOGIST

## 2020-12-17 PROCEDURE — 99213 OFFICE O/P EST LOW 20 MIN: CPT | Mod: 25 | Performed by: PHYSICIAN ASSISTANT

## 2020-12-17 PROCEDURE — G0463 HOSPITAL OUTPT CLINIC VISIT: HCPCS | Mod: 25

## 2020-12-17 RX ORDER — FLUOCINONIDE 0.5 MG/G
OINTMENT TOPICAL 2 TIMES DAILY
COMMUNITY

## 2020-12-17 RX ORDER — OFLOXACIN 3 MG/ML
5 SOLUTION AURICULAR (OTIC) 2 TIMES DAILY
Qty: 4 ML | Refills: 0 | Status: SHIPPED | OUTPATIENT
Start: 2020-12-17 | End: 2020-12-24

## 2020-12-17 RX ORDER — HYDROCHLOROTHIAZIDE 25 MG/1
25 TABLET ORAL DAILY
COMMUNITY
End: 2021-01-12

## 2020-12-17 ASSESSMENT — PAIN SCALES - GENERAL: PAINLEVEL: NO PAIN (0)

## 2020-12-17 ASSESSMENT — MIFFLIN-ST. JEOR: SCORE: 1342.17

## 2020-12-17 NOTE — PROGRESS NOTES
Audiology Evaluation Completed. Please refer SCANNED AUDIOGRAM and/or TYMPANOGRAM for BACKGROUND, RESULTS, RECOMMENDATIONS.      Claudia PEREZ, [facility]  Audiologist #[id number]

## 2020-12-17 NOTE — NURSING NOTE
"Chief Complaint   Patient presents with     Hearing Problem     HEA       Initial /64   Pulse 81   Temp 97.6  F (36.4  C) (Tympanic)   Resp 16   Ht 1.702 m (5' 7\")   Wt 69.9 kg (154 lb)   SpO2 96%   BMI 24.12 kg/m   Estimated body mass index is 24.12 kg/m  as calculated from the following:    Height as of this encounter: 1.702 m (5' 7\").    Weight as of this encounter: 69.9 kg (154 lb).  Medication Reconciliation: complete  Maddy Guajardo LPN  "

## 2020-12-17 NOTE — Clinical Note
Saw Alvaro yesterday- he will follow up with you to recheck NP. He has a difficult left nares due to DNS- It appeared cyst like on scope. But will have you recheck to ensure no additional testing is required. Thanks. T

## 2020-12-17 NOTE — PATIENT INSTRUCTIONS
Ear canals were cleaned.   Right ear appears well. No fluid or infection.   Left ear had an effusion (fluid). Myringotomy was completed.   Start Floxin 5 drops twice a day for 7 days to left ear.   Return in 1 month w/ audiogram.     Thank you for allowing Sowmya Del Rosario PA-C and our ENT team to participate in your care.  If your medications are too expensive, please give the nurse a call.  We can possibly change this medication.  If you have a scheduling or an appointment question please contact our Health Unit Coordinator at their direct line [phone].   ALL nursing questions or concerns can be directed to your ENT nurse at: [phone] [facility]    Instructions for Myringotomy       Take one dose of liquid or chewable TYLENOL based on weight the morning of surgery.   If you can swallow pills you may take tylenol tablets with a small sip of water.  If you have any dosing questions ask your pharmacist.         Recovery - The placement of ear tubes is a brief operation, and therefore the recovery from the anesthetic is usually less than a day.  However, in young children the sleep patterns, feeding, and behavior can be altered for several days.  Try to return to the daily routine as soon as possible and this issue will resolve without problems.  There are no restrictions to diet or activity after ear tube placement.    Medications - Children and adults can return to all preoperative medications after this procedure, including blood thinners.  You were sent home with ear drops, please use them as directed to assist in the rapid healing of the ear drum around the tube.  Pain medication may have been sent home with you, but a vast majority of the time, over the counter Tylenol or ibuprofen (advil) I sufficient. Finish prescription ear drops (4 drops twice a day).     Complications - A low grade fever (up to 100 degrees ) is not unusual in the day after tubes are placed.  Treat this with cool wash cloths to the forehead and  Tylenol.  If the fever is higher, or does not respond to medication, call the Doctor s office or call service after hours.  A small amount of bloody drainage can occur for a day or two after ear tubes, and is perfectly normal, continue the ear drops as directed and it will clear up.    Water Precautions - Recent clinical research has shown that absolute water precautions are not always necessary.  Ear plugs or water head bands are not necessary unless the ear is actively draining, or if your child does not like the sensation of water in the ear.    Follow up - Approximately 1 month after the tubes are placed I like to examine the ears to make sure there are no signs of complications, which are extremely rare.  You should already have an appointment in 1 month with ENT PA and audiology.  If not, call our office at [phone].  In some unusual cases the ears  reject  the tubes.  Depending on the situation, a hearing test may or may not be performed at that time.  Afterwards, follow up is done every 6 months, but of course earlier if there are any issues or problems.    Advantages of Tubes - After ear tube placement, there are certain benefits from having a direct communication of the middle ear space with the ear canal.  In the event of drainage from the ears with ear tubes in place ( which is common with colds and flus ) use the ear drops you were discharged home with using the same dosage and instructions.  This will clear up the ears without the need for oral antibiotics a majority of the time.  Another advantage is that with tubes in place, the ears automatically adjust to changes in atmospheric pressure ( such as in airplanes or elevation ).  In other words, if the tubes are open the ears will not hurt or pop!

## 2020-12-17 NOTE — PROGRESS NOTES
Otolaryngology Consultation    Patient: Alvaro Bass Jr.  : 1935    Patient presents with:  Hearing Problem: HEA      HPI:  Alvaro Bass Jr. is a 85 year old male seen today for Hearing Evaluation.   He noted about 3 weeks ago that his left ear was plugging and popping at times. However, 2-3 days ago his ear became plugged.   No recent preceding illness. No concerns with nasal congestion.   No sinus or allergy concerns.     Hx of SNHL for several years and aids for the last 5 years from [facility].     Denies COM or otologic surgeries.   Denies otalgia, otorrhea  Denies worrisome tinnitus  Denies fluctuating hearing loss or tinnitus.   Denies vertigo or facial paraesthesia.   Quit tobacco in .   No worrisome nasal congestion, sinusitis, or post nasal drainage.     Audiogram  Type C Left ear  Type A Right ear.   Thresholds are right mild to sloping severe SNHL, left ear moderate to profound mixed hearing loss.   SRT=PTA  WRS-   Right Ear- 28% at 85 dB  Left Ear- 32% at 105 dB  Current Outpatient Rx   Medication Sig Dispense Refill     amLODIPine (NORVASC) 2.5 MG tablet Take 1 tablet (2.5 mg) by mouth daily 90 tablet 3     aspirin (GOODSENSE ASPIRIN) 325 MG tablet Take 325 mg by mouth daily with food       atenolol (TENORMIN) 50 MG tablet Take 1 tablet (50 mg) by mouth daily 90 tablet 3     ketoconazole (NIZORAL) 2 % external shampoo USE 2-3 TIMES WEEKLY. LEAVE IN SCALP FOR 5 MINUTES PRIOR TO RINSING. 120 mL 0     losartan (COZAAR) 100 MG tablet Take 1 tablet by mouth once daily 90 tablet 0     losartan-hydrochlorothiazide (HYZAAR) 100-25 MG tablet Take 1 tablet by mouth daily 90 tablet 3     psyllium (TGT PSYLLIUM FIBER) 0.52 G capsule Take 1 capsule by mouth At Bedtime       simvastatin (ZOCOR) 80 MG tablet Take 0.5 tablets (40 mg) by mouth daily 45 tablet 3     tamsulosin (FLOMAX) 0.4 MG capsule Take 1 capsule (0.4 mg) by mouth daily with food 90 capsule 3     triamcinolone (KENALOG) 0.1 % external  ointment Apply topically 3 times daily 80 g 2       Allergies: Patient has no known allergies.     Past Medical History:   Diagnosis Date     Atherosclerotic heart disease of native coronary artery without angina pectoris     No Comments Provided     Atrioventricular block, complete (H)     No Comments Provided     Bladder neck obstruction     No Comments Provided     Chronic kidney disease, stage III (moderate)     2014     Diverticulosis of large intestine without perforation or abscess without bleeding     3/9/2015     Essential (primary) hypertension     No Comments Provided     History of colonic polyps     ,not adenomatous     Hyperlipidemia     No Comments Provided     Non-ST elevation (NSTEMI) myocardial infarction (H)     1999,with favorable post myocardial infarction thallium studies.     Occlusion and stenosis of unspecified carotid artery     2012,Moderate <60% bilateral on US       Past Surgical History:   Procedure Laterality Date     APPENDECTOMY OPEN       COLONOSCOPY      2010,Numerous polyps-all hyperplastic.  Next due .     COLONOSCOPY      3/9/15,F/U N/A age     CYSTOSCOPY      No Comments Provided     EXTRACAPSULAR CATARACT EXTRATION WITH INTRAOCULAR LENS IMPLANT Bilateral      HEART CATH, ANGIOPLASTY      ,right coronary artery     HERNIA REPAIR Right 2016    LAPAROSCOPIC INCARCERATED INGUINAL HERNIA REPAIR     IMPLANT PACEMAKER      2005,Dual chamber pacemaker, [employer], model:  E2ER01     REPLACE PACEMAKER GENERATOR      2013       ENT family history reviewed    Social History     Tobacco Use     Smoking status: Former Smoker     Types: Cigarettes     Quit date: 1999     Years since quittin.9     Smokeless tobacco: Never Used   Substance Use Topics     Alcohol use: No     Alcohol/week: 0.0 standard drinks     Drug use: No     Comment: Drug use: No       Review of Systems  ROS: 10 point ROS neg other than the symptoms noted above in the HPI  "    Physical Exam  /64   Pulse 81   Temp 97.6  F (36.4  C) (Tympanic)   Resp 16   Ht 1.702 m (5' 7\")   Wt 69.9 kg (154 lb)   SpO2 96%   BMI 24.12 kg/m    General - The patient is well nourished and well developed, and appears to have good nutritional status.  Alert and oriented to person and place, answers questions and cooperates with examination appropriately.   Head and Face - Normocephalic and atraumatic, with no gross asymmetry noted.  The facial nerve is intact, with strong symmetric movements.  Voice and Breathing - The patient was breathing comfortably without the use of accessory muscles. There was no wheezing, stridor, or stertor.  The patients voice was clear and strong, and had appropriate pitch and quality.  Ears -The external auditory canals are patent, the tympanic membranes are intact. Right ear is clear without effusion or retraction. Left ear serous effusion.   Eyes - Extraocular movements intact, and the pupils were reactive to light.  Sclera were not icteric or injected, conjunctiva were pink and moist.  Mouth - Examination of the oral cavity showed pink, healthy oral mucosa. No lesions or ulcerations noted.  The tongue was mobile and midline, and edentulous, upper/ lower plated.   Throat - The walls of the oropharynx were smooth, pink, moist, symmetric, and had no lesions or ulcerations.  The tonsillar pillars and soft palate were symmetric.  The uvula was midline on elevation.    Neck - Normal midline excursion of the laryngotracheal complex during swallowing.  Full range of motion on passive movement.  Palpation of the occipital, submental, submandibular, internal jugular chain, and supraclavicular nodes did not demonstrate any abnormal lymph nodes or masses.  Palpation of the thyroid was soft and smooth, with no nodules or goiter appreciated.  The trachea was mobile and midline.  Nose - External contour is symmetric, no gross deflection or scars.  Nasal mucosa is pink and moist " with no abnormal mucus.  The septum and turbinates were evaluated:DNS with obstruction along left.     After informed consent was obtained and the nose was anesthetized with topical neosynepherine/lidocaine, the scope was advanced into the nares.  RIght nares is non obstructed. Left Nares is grossly obstructed due to deviated nasal septum with lateral wall contact. Did need peds flex to pass to examine NP. RIght ET is patent without obstruction. :Left ET with cyst appearance along Fossa of  Rosenmueller.   Procedure - Bilateral Myringotomy without tube    Procedure - After discussion of the risks and benefits of myringotomy, informed consent was signed and placed in the chart.  I began with the LEFT side.  I proceeded to position the patient in a semi-supine position in the examination chair.  Using the binocular surgical microscope, I then proceeded to clean the canal of cerumen and squamous debris.  I was able to see the tympanic membrane.  Using a small cotton tipped applicator, I applied a tiny coating of phenol onto the tympanic membrane.  After visualizing a good donya, I then proceeded to use a myringotomy knife to make a radially oriented incision in the tympanic membrane.  A moderate amount of clear yellow effusion was suctioned away.        ASSESSMENT:    ICD-10-CM    1. COME (chronic otitis media with effusion), left  H65.492 ofloxacin (FLOXIN) 0.3 % otic solution   2. Dysfunction of left eustachian tube  H69.82 ofloxacin (FLOXIN) 0.3 % otic solution   3. S/P myringotomy without insertion of tube  Z96.22 ofloxacin (FLOXIN) 0.3 % otic solution   4. Cyst of nasopharynx  J39.2    5. Personal history of tobacco use, presenting hazards to health  Z87.891    6. DNS (deviated nasal septum)  J34.2          Start Floxin BID for 7 days to left ear.  He will return in 3-4 weeks with repeat audiogram.   If continued concerns, consider tube placement.   Follow up will be scheduled w/ Dr. Chahal for recheck of  his NP. It appears small on examination today, nonetheless will ensure no additional imaging or biopsy is required.   He agrees with this plan.      The patient was instructed on water precautions and given a prescription for ciprodex drops to use for three days.  I will see them in 2 to 4 weeks for follow up and repeat audiogram.  The patient was instructed to call in or return sooner if there was any drainage from the ear.        Sowmya Del Rosario PA-C  ENT  [facility], [place]  [phone]

## 2020-12-17 NOTE — LETTER
2020         RE: Alvaro Bass Jr.  [address]  [address] [facility]  [place] [address]        Dear Colleague,    Thank you for referring your patient, Alvaro Bass Jr., to the [facility]. Please see a copy of my visit note below.    Otolaryngology Consultation    Patient: Alvaro Bass Jr.  : 1935    Patient presents with:  Hearing Problem: HEA      HPI:  Alvaro Bass Jr. is a 85 year old male seen today for Hearing Evaluation.   He noted about 3 weeks ago that his left ear was plugging and popping at times. However, 2-3 days ago his ear became plugged.   No recent preceding illness. No concerns with nasal congestion.   No sinus or allergy concerns.     Hx of SNHL for several years and aids for the last 5 years from [facility].     Denies COM or otologic surgeries.   Denies otalgia, otorrhea  Denies worrisome tinnitus  Denies fluctuating hearing loss or tinnitus.   Denies vertigo or facial paraesthesia.   Quit tobacco in .   No worrisome nasal congestion, sinusitis, or post nasal drainage.     Audiogram  Type C Left ear  Type A Right ear.   Thresholds are right mild to sloping severe SNHL, left ear moderate to profound mixed hearing loss.   SRT=PTA  WRS-   Right Ear- 28% at 85 dB  Left Ear- 32% at 105 dB  Current Outpatient Rx   Medication Sig Dispense Refill     amLODIPine (NORVASC) 2.5 MG tablet Take 1 tablet (2.5 mg) by mouth daily 90 tablet 3     aspirin (GOODSENSE ASPIRIN) 325 MG tablet Take 325 mg by mouth daily with food       atenolol (TENORMIN) 50 MG tablet Take 1 tablet (50 mg) by mouth daily 90 tablet 3     ketoconazole (NIZORAL) 2 % external shampoo USE 2-3 TIMES WEEKLY. LEAVE IN SCALP FOR 5 MINUTES PRIOR TO RINSING. 120 mL 0     losartan (COZAAR) 100 MG tablet Take 1 tablet by mouth once daily 90 tablet 0     losartan-hydrochlorothiazide (HYZAAR) 100-25 MG tablet Take 1 tablet by mouth daily 90 tablet 3     psyllium (TGT PSYLLIUM FIBER) 0.52 G capsule Take 1  capsule by mouth At Bedtime       simvastatin (ZOCOR) 80 MG tablet Take 0.5 tablets (40 mg) by mouth daily 45 tablet 3     tamsulosin (FLOMAX) 0.4 MG capsule Take 1 capsule (0.4 mg) by mouth daily with food 90 capsule 3     triamcinolone (KENALOG) 0.1 % external ointment Apply topically 3 times daily 80 g 2       Allergies: Patient has no known allergies.     Past Medical History:   Diagnosis Date     Atherosclerotic heart disease of native coronary artery without angina pectoris     No Comments Provided     Atrioventricular block, complete (H)     No Comments Provided     Bladder neck obstruction     No Comments Provided     Chronic kidney disease, stage III (moderate)     8/21/2014     Diverticulosis of large intestine without perforation or abscess without bleeding     3/9/2015     Essential (primary) hypertension     No Comments Provided     History of colonic polyps     2000,not adenomatous     Hyperlipidemia     No Comments Provided     Non-ST elevation (NSTEMI) myocardial infarction (H)     2/28/1999,with favorable post myocardial infarction thallium studies.     Occlusion and stenosis of unspecified carotid artery     2/2012,Moderate <60% bilateral on US       Past Surgical History:   Procedure Laterality Date     APPENDECTOMY OPEN  1953     COLONOSCOPY      2/4/2010,Numerous polyps-all hyperplastic.  Next due 2015.     COLONOSCOPY      3/9/15,F/U N/A age     CYSTOSCOPY      No Comments Provided     EXTRACAPSULAR CATARACT EXTRATION WITH INTRAOCULAR LENS IMPLANT Bilateral      HEART CATH, ANGIOPLASTY      2001,right coronary artery     HERNIA REPAIR Right 2016    LAPAROSCOPIC INCARCERATED INGUINAL HERNIA REPAIR     IMPLANT PACEMAKER      1/2005,Dual chamber pacemaker, [employer], model:  E2ER01     REPLACE PACEMAKER GENERATOR      8/21/2013       ENT family history reviewed    Social History     Tobacco Use     Smoking status: Former Smoker     Types: Cigarettes     Quit date: 1/1/1999     Years since  "quittin.9     Smokeless tobacco: Never Used   Substance Use Topics     Alcohol use: No     Alcohol/week: 0.0 standard drinks     Drug use: No     Comment: Drug use: No       Review of Systems  ROS: 10 point ROS neg other than the symptoms noted above in the HPI     Physical Exam  /64   Pulse 81   Temp 97.6  F (36.4  C) (Tympanic)   Resp 16   Ht 1.702 m (5' 7\")   Wt 69.9 kg (154 lb)   SpO2 96%   BMI 24.12 kg/m    General - The patient is well nourished and well developed, and appears to have good nutritional status.  Alert and oriented to person and place, answers questions and cooperates with examination appropriately.   Head and Face - Normocephalic and atraumatic, with no gross asymmetry noted.  The facial nerve is intact, with strong symmetric movements.  Voice and Breathing - The patient was breathing comfortably without the use of accessory muscles. There was no wheezing, stridor, or stertor.  The patients voice was clear and strong, and had appropriate pitch and quality.  Ears -The external auditory canals are patent, the tympanic membranes are intact. Right ear is clear without effusion or retraction. Left ear serous effusion.   Eyes - Extraocular movements intact, and the pupils were reactive to light.  Sclera were not icteric or injected, conjunctiva were pink and moist.  Mouth - Examination of the oral cavity showed pink, healthy oral mucosa. No lesions or ulcerations noted.  The tongue was mobile and midline, and edentulous, upper/ lower plated.   Throat - The walls of the oropharynx were smooth, pink, moist, symmetric, and had no lesions or ulcerations.  The tonsillar pillars and soft palate were symmetric.  The uvula was midline on elevation.    Neck - Normal midline excursion of the laryngotracheal complex during swallowing.  Full range of motion on passive movement.  Palpation of the occipital, submental, submandibular, internal jugular chain, and supraclavicular nodes did not " demonstrate any abnormal lymph nodes or masses.  Palpation of the thyroid was soft and smooth, with no nodules or goiter appreciated.  The trachea was mobile and midline.  Nose - External contour is symmetric, no gross deflection or scars.  Nasal mucosa is pink and moist with no abnormal mucus.  The septum and turbinates were evaluated:DNS with obstruction along left.     After informed consent was obtained and the nose was anesthetized with topical neosynepherine/lidocaine, the scope was advanced into the nares.  RIght nares is non obstructed. Left Nares is grossly obstructed due to deviated nasal septum with lateral wall contact. Did need peds flex to pass to examine NP. RIght ET is patent without obstruction. :Left ET with cyst appearance along Fossa of  Rosenmu[place].   Procedure - Bilateral Myringotomy without tube    Procedure - After discussion of the risks and benefits of myringotomy, informed consent was signed and placed in the chart.  I began with the LEFT side.  I proceeded to position the patient in a semi-supine position in the examination chair.  Using the binocular surgical microscope, I then proceeded to clean the canal of cerumen and squamous debris.  I was able to see the tympanic membrane.  Using a small cotton tipped applicator, I applied a tiny coating of phenol onto the tympanic membrane.  After visualizing a good donya, I then proceeded to use a myringotomy knife to make a radially oriented incision in the tympanic membrane.  A moderate amount of clear yellow effusion was suctioned away.        ASSESSMENT:    ICD-10-CM    1. COME (chronic otitis media with effusion), left  H65.492 ofloxacin (FLOXIN) 0.3 % otic solution   2. Dysfunction of left eustachian tube  H69.82 ofloxacin (FLOXIN) 0.3 % otic solution   3. S/P myringotomy without insertion of tube  Z96.22 ofloxacin (FLOXIN) 0.3 % otic solution   4. Cyst of nasopharynx  J39.2    5. Personal history of tobacco use, presenting hazards to  health  Z87.891    6. DNS (deviated nasal septum)  J34.2          Start Floxin BID for 7 days to left ear.  He will return in 3-4 weeks with repeat audiogram.   If continued concerns, consider tube placement.   Follow up will be scheduled w/ Dr. Chahal for recheck of his NP. It appears small on examination today, nonetheless will ensure no additional imaging or biopsy is required.   He agrees with this plan.      The patient was instructed on water precautions and given a prescription for ciprodex drops to use for three days.  I will see them in 2 to 4 weeks for follow up and repeat audiogram.  The patient was instructed to call in or return sooner if there was any drainage from the ear.        Sowmya Del Rosario PA-C  ENT  [facility]  [phone]        Again, thank you for allowing me to participate in the care of your patient.        Sincerely,        Sowmya Del Rosario PA-C

## 2020-12-22 DIAGNOSIS — H91.93 DECREASED HEARING OF BOTH EARS: Primary | ICD-10-CM

## 2020-12-26 DIAGNOSIS — I10 HYPERTENSION, UNSPECIFIED TYPE: ICD-10-CM

## 2020-12-28 RX ORDER — AMLODIPINE BESYLATE 2.5 MG/1
TABLET ORAL
Qty: 30 TABLET | Refills: 0 | Status: SHIPPED | OUTPATIENT
Start: 2020-12-28 | End: 2021-01-12

## 2020-12-28 NOTE — TELEPHONE ENCOUNTER
Last Written Prescription Date:  amLODIPine (NORVASC) 2.5 MG tablet  Last Fill Quantity: 12/23/2019,  # refills: 90   Last office visit: 12/16/2020 with prescribing provider:  3   Future Office Visit:   Next 5 appointments (look out 90 days)    Jan 13, 2021  9:00 AM  PHYSICAL with Jose Raul Morris MD  [facility] ([facility]) [address]  [place] [address]  [phone]         Medication is being filled for 1 time refill only due to:  Patient needs labs creat.       Teresa Rice RN  ....................  12/28/2020   3:58 PM

## 2021-01-10 ENCOUNTER — MYC MEDICAL ADVICE (OUTPATIENT)
Dept: INTERNAL MEDICINE | Facility: OTHER | Age: 86
End: 2021-01-10

## 2021-01-11 DIAGNOSIS — I10 HYPERTENSION, UNSPECIFIED TYPE: Primary | ICD-10-CM

## 2021-01-12 RX ORDER — AMLODIPINE BESYLATE 2.5 MG/1
TABLET ORAL
Qty: 90 TABLET | Refills: 3 | Status: SHIPPED | OUTPATIENT
Start: 2021-01-12 | End: 2022-02-07

## 2021-01-12 RX ORDER — HYDROCHLOROTHIAZIDE 25 MG/1
TABLET ORAL
Qty: 90 TABLET | Refills: 3 | Status: SHIPPED | OUTPATIENT
Start: 2021-01-12 | End: 2022-01-17

## 2021-01-12 NOTE — TELEPHONE ENCOUNTER
[id number] [place] sent Rx request for the following:      Requested Prescriptions   Pending Prescriptions Disp Refills   hydrochlorothiazide (HYDRODIURIL) 25 MG tablet [Pharmacy Med Name: hydroCHLOROthiazide 25 MG Oral Tablet] 90 tablet 0    Sig: Take 1 tablet by mouth once daily   Routing refill request to provider for review/approval because:   Diuretics (Including Combos) Protocol Failed - 1/12/2021 10:51 AM       Failed - Normal serum creatinine on file in past 12 months       Failed - Normal serum potassium on file in past 12 months       Failed - Normal serum sodium on file in past 12 months   Historically reported      amLODIPine (NORVASC) 2.5 MG tablet [Pharmacy Med Name: amLODIPine Besylate 2.5 MG Oral Tablet] 30 tablet 0    Sig: Take 1 tablet by mouth once daily   Last Prescription Date:   12/28/20  Last Fill Qty/Refills:         30, R-0    Routing refill request to provider for review/approval because:   Calcium Channel Blockers Protocol  Failed - 1/12/2021 10:51 AM       Failed - Normal serum creatinine on file in past 12 months     Last Office Visit:              12/16/20  Future Office visit:             Next 5 appointments (look out 90 days)    Jan 13, 2021  9:00 AM  PHYSICAL with Jose Raul Morris MD  [facility] ([facility]) [address]  [place] [id number] [phone]        Routing to Dr. Morris, to address during tomorrow's appointment. Unable to complete prescription refill per RN Medication Refill Policy. Marti Garrido RN .............. 1/12/2021  10:56 AM

## 2021-01-13 ENCOUNTER — OFFICE VISIT (OUTPATIENT)
Dept: INTERNAL MEDICINE | Facility: OTHER | Age: 86
End: 2021-01-13
Attending: INTERNAL MEDICINE
Payer: MEDICARE

## 2021-01-13 VITALS
WEIGHT: 152.8 LBS | SYSTOLIC BLOOD PRESSURE: 134 MMHG | OXYGEN SATURATION: 98 % | HEIGHT: 67 IN | BODY MASS INDEX: 23.98 KG/M2 | TEMPERATURE: 97 F | DIASTOLIC BLOOD PRESSURE: 78 MMHG | RESPIRATION RATE: 16 BRPM | HEART RATE: 84 BPM

## 2021-01-13 DIAGNOSIS — N18.32 STAGE 3B CHRONIC KIDNEY DISEASE (H): ICD-10-CM

## 2021-01-13 DIAGNOSIS — L21.9 SEBORRHEIC DERMATITIS OF SCALP: ICD-10-CM

## 2021-01-13 DIAGNOSIS — N32.0 BLADDER OUTLET OBSTRUCTION: ICD-10-CM

## 2021-01-13 DIAGNOSIS — R39.9 LOWER URINARY TRACT SYMPTOMS (LUTS): ICD-10-CM

## 2021-01-13 DIAGNOSIS — I65.23 BILATERAL CAROTID ARTERY STENOSIS: ICD-10-CM

## 2021-01-13 DIAGNOSIS — I25.10 ATHEROSCLEROSIS OF NATIVE CORONARY ARTERY OF NATIVE HEART WITHOUT ANGINA PECTORIS: Primary | ICD-10-CM

## 2021-01-13 DIAGNOSIS — I44.2 ATRIOVENTRICULAR BLOCK, COMPLETE (H): ICD-10-CM

## 2021-01-13 DIAGNOSIS — I10 ESSENTIAL HYPERTENSION: ICD-10-CM

## 2021-01-13 DIAGNOSIS — E78.5 HYPERLIPIDEMIA, UNSPECIFIED HYPERLIPIDEMIA TYPE: ICD-10-CM

## 2021-01-13 DIAGNOSIS — H90.3 BILATERAL SENSORINEURAL HEARING LOSS: ICD-10-CM

## 2021-01-13 LAB
ALBUMIN SERPL-MCNC: 4.5 G/DL (ref 3.5–5.7)
ALP SERPL-CCNC: 48 U/L (ref 34–104)
ALT SERPL W P-5'-P-CCNC: 12 U/L (ref 7–52)
ANION GAP SERPL CALCULATED.3IONS-SCNC: 11 MMOL/L (ref 3–14)
AST SERPL W P-5'-P-CCNC: 19 U/L (ref 13–39)
BILIRUB SERPL-MCNC: 1.1 MG/DL (ref 0.3–1)
BUN SERPL-MCNC: 20 MG/DL (ref 7–25)
CALCIUM SERPL-MCNC: 10 MG/DL (ref 8.6–10.3)
CHLORIDE SERPL-SCNC: 97 MMOL/L (ref 98–107)
CHOLEST SERPL-MCNC: 139 MG/DL
CO2 SERPL-SCNC: 25 MMOL/L (ref 21–31)
CREAT SERPL-MCNC: 1.54 MG/DL (ref 0.7–1.3)
GFR SERPL CREATININE-BSD FRML MDRD: 43 ML/MIN/{1.73_M2}
GLUCOSE SERPL-MCNC: 120 MG/DL (ref 70–105)
HDLC SERPL-MCNC: 53 MG/DL (ref 23–92)
LDLC SERPL CALC-MCNC: 68 MG/DL
NONHDLC SERPL-MCNC: 86 MG/DL
POTASSIUM SERPL-SCNC: 4 MMOL/L (ref 3.5–5.1)
PROT SERPL-MCNC: 7.1 G/DL (ref 6.4–8.9)
SODIUM SERPL-SCNC: 133 MMOL/L (ref 134–144)
TRIGL SERPL-MCNC: 92 MG/DL

## 2021-01-13 PROCEDURE — 80061 LIPID PANEL: CPT | Mod: ZL | Performed by: INTERNAL MEDICINE

## 2021-01-13 PROCEDURE — 36415 COLL VENOUS BLD VENIPUNCTURE: CPT | Mod: ZL | Performed by: INTERNAL MEDICINE

## 2021-01-13 PROCEDURE — 80053 COMPREHEN METABOLIC PANEL: CPT | Mod: ZL | Performed by: INTERNAL MEDICINE

## 2021-01-13 PROCEDURE — G0463 HOSPITAL OUTPT CLINIC VISIT: HCPCS

## 2021-01-13 PROCEDURE — 99215 OFFICE O/P EST HI 40 MIN: CPT | Performed by: INTERNAL MEDICINE

## 2021-01-13 RX ORDER — SIMVASTATIN 80 MG
40 TABLET ORAL DAILY
Qty: 45 TABLET | Refills: 3 | Status: SHIPPED | OUTPATIENT
Start: 2021-01-13 | End: 2022-02-07

## 2021-01-13 RX ORDER — TAMSULOSIN HYDROCHLORIDE 0.4 MG/1
0.4 CAPSULE ORAL
Qty: 90 CAPSULE | Refills: 3 | Status: SHIPPED | OUTPATIENT
Start: 2021-01-13 | End: 2022-02-07

## 2021-01-13 RX ORDER — ATENOLOL 50 MG/1
50 TABLET ORAL DAILY
Qty: 90 TABLET | Refills: 3 | Status: SHIPPED | OUTPATIENT
Start: 2021-01-13 | End: 2022-02-07

## 2021-01-13 ASSESSMENT — ENCOUNTER SYMPTOMS
FEVER: 0
TREMORS: 0
ADENOPATHY: 0
WEAKNESS: 0
NAUSEA: 0
ABDOMINAL PAIN: 0
LIGHT-HEADEDNESS: 0
UNEXPECTED WEIGHT CHANGE: 0
VOICE CHANGE: 0
SLEEP DISTURBANCE: 0
DIAPHORESIS: 0
HEADACHES: 0
DIARRHEA: 0
SINUS PRESSURE: 0
CONFUSION: 0
JOINT SWELLING: 0
SORE THROAT: 0
SEIZURES: 0
DIZZINESS: 0
COUGH: 0
SINUS PAIN: 0
FATIGUE: 0
WHEEZING: 0
AGITATION: 0
CHILLS: 0
BACK PAIN: 0
EYE REDNESS: 0
VOMITING: 0
CHEST TIGHTNESS: 0
RHINORRHEA: 0
PALPITATIONS: 0
CONSTIPATION: 0
WOUND: 0
HEMATURIA: 0
HALLUCINATIONS: 0
TROUBLE SWALLOWING: 0
NERVOUS/ANXIOUS: 0
EYE PAIN: 0
ABDOMINAL DISTENTION: 0
NECK PAIN: 0
BLOOD IN STOOL: 0
DYSURIA: 0
NUMBNESS: 0
DIFFICULTY URINATING: 0
FREQUENCY: 0
BRUISES/BLEEDS EASILY: 0
SHORTNESS OF BREATH: 0
FLANK PAIN: 0
NECK STIFFNESS: 0
APPETITE CHANGE: 0
SPEECH DIFFICULTY: 0

## 2021-01-13 ASSESSMENT — MIFFLIN-ST. JEOR: SCORE: 1336.73

## 2021-01-13 ASSESSMENT — PAIN SCALES - GENERAL: PAINLEVEL: NO PAIN (0)

## 2021-01-13 NOTE — PROGRESS NOTES
Chief Complaint:  This patient is here for a comprehensive review of their multiple medical problems, renewal of medications and update on necessary health maintenance issues.    HPI: This patient is here today for complete evaluation and review of his chronic medical problems.  He has a history of atherosclerotic coronary artery disease with previous MI and intervention.  Associated with this, he has a history of heart block and is status post pacemaker placement.  He does admit to some dyspnea on exertion but he does not have any chest pain and he does not need to use any nitroglycerin at any time.  He does not think that he is having any significant cardiac issues at this time.    He has a history of hypertension and is treated with multidrug therapy.  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  His blood pressure today is well controlled and he does not have any problems with his medications at present.  The patient has treated hyperlipidemia.  He is on moderate intensity statin therapy.  He tolerates this without difficulty or concern.    He does have a history of bilateral carotid artery stenosis.  He had a CT scan done a year ago.  This correlated with his previous carotid ultrasound.  He does have fairly significant carotid artery obstruction and I think we should probably evaluate that on an annual basis going forward.  He is in agreement.    He has a lot of dermatitis problems, he has some creams that he uses for this.  He also has a lot of trouble with his hearing lately and is working through ENT to try to get this improved.  He recently had a myringotomy but that has not resulted in any improvement in his hearing.    Medications are reconciled.  Past medical history, past surgical history, family history and social histories are reviewed and updated.  Immunizations are up-to-date.  He no longer needs colonoscopy.    Past Medical History:    Diagnosis Date     Atherosclerotic heart disease of native coronary artery without angina pectoris     No Comments Provided     Atrioventricular block, complete (H)     No Comments Provided     Bladder neck obstruction     No Comments Provided     Chronic kidney disease, stage III (moderate)     8/21/2014     Diverticulosis of large intestine without perforation or abscess without bleeding     3/9/2015     Essential (primary) hypertension     No Comments Provided     History of colonic polyps     2000,not adenomatous     Hyperlipidemia     No Comments Provided     Non-ST elevation (NSTEMI) myocardial infarction (H)     2/28/1999,with favorable post myocardial infarction thallium studies.     Occlusion and stenosis of unspecified carotid artery     2/2012,Moderate <60% bilateral on US       Past Surgical History:   Procedure Laterality Date     APPENDECTOMY OPEN  1953     COLONOSCOPY      2/4/2010,Numerous polyps-all hyperplastic.  Next due 2015.     COLONOSCOPY      3/9/15,F/U N/A age     CYSTOSCOPY      No Comments Provided     EXTRACAPSULAR CATARACT EXTRATION WITH INTRAOCULAR LENS IMPLANT Bilateral      HEART CATH, ANGIOPLASTY      2001,right coronary artery     HERNIA REPAIR Right 2016    LAPAROSCOPIC INCARCERATED INGUINAL HERNIA REPAIR     IMPLANT PACEMAKER      1/2005,Dual chamber pacemaker, [employer], model:  E2ER01     REPLACE PACEMAKER GENERATOR      8/21/2013       Current Outpatient Medications   Medication Sig Dispense Refill     amLODIPine (NORVASC) 2.5 MG tablet Take 1 tablet by mouth once daily 90 tablet 3     aspirin (GOODSENSE ASPIRIN) 325 MG tablet Take 325 mg by mouth daily with food       atenolol (TENORMIN) 50 MG tablet Take 1 tablet (50 mg) by mouth daily 90 tablet 3     fluocinonide (LIDEX) 0.05 % external ointment Apply topically 2 times daily       hydrochlorothiazide (HYDRODIURIL) 25 MG tablet Take 1 tablet by mouth once daily 90 tablet 3     ketoconazole (NIZORAL) 2 % external shampoo USE  2-3 TIMES WEEKLY. LEAVE IN SCALP FOR 5 MINUTES PRIOR TO RINSING. 120 mL 0     losartan (COZAAR) 100 MG tablet Take 1 tablet by mouth once daily 90 tablet 0     psyllium (TGT PSYLLIUM FIBER) 0.52 G capsule Take 1 capsule by mouth At Bedtime       simvastatin (ZOCOR) 80 MG tablet Take 0.5 tablets (40 mg) by mouth daily 45 tablet 3     tamsulosin (FLOMAX) 0.4 MG capsule Take 1 capsule (0.4 mg) by mouth daily with food 90 capsule 3     triamcinolone (KENALOG) 0.1 % external ointment Apply topically 3 times daily 80 g 2       No Known Allergies    Family History   Problem Relation Age of Onset     Other - See Comments Mother         Old age     Heart Disease Father      Other - See Comments Father         Stroke     Other - See Comments Other         severe COPD     Cancer Brother         Lung       Social History     Socioeconomic History     Marital status:      Spouse name:      Number of children: Not on file     Years of education: Not on file     Highest education level: Not on file   Occupational History     Not on file   Social Needs     Financial resource strain: Not on file     Food insecurity     Worry: Not on file     Inability: Not on file     Transportation needs     Medical: Not on file     Non-medical: Not on file   Tobacco Use     Smoking status: Former Smoker     Types: Cigarettes     Quit date: 1999     Years since quittin.0     Smokeless tobacco: Never Used   Substance and Sexual Activity     Alcohol use: No     Alcohol/week: 0.0 standard drinks     Drug use: No     Comment: Drug use: No     Sexual activity: Not Currently   Lifestyle     Physical activity     Days per week: Not on file     Minutes per session: Not on file     Stress: Not on file   Relationships     Social connections     Talks on phone: Not on file     Gets together: Not on file     Attends [employer] service: Not on file     Active member of club or organization: Not on file     Attends meetings of clubs or  organizations: Not on file     Relationship status: Not on file     Intimate partner violence     Fear of current or ex partner: Not on file     Emotionally abused: Not on file     Physically abused: Not on file     Forced sexual activity: Not on file   Other Topics Concern     Parent/sibling w/ CABG, MI or angioplasty before 65F 55M? Not Asked   Social History Narrative    Was  to twice; second wife  2016 after a long mederos with cancer; she was admitted to hospice early 2016.  Retired from [employer].  Lives in [place].       Review of Systems   Constitutional: Negative for appetite change, chills, diaphoresis, fatigue, fever and unexpected weight change.   HENT: Positive for hearing loss. Negative for ear pain, rhinorrhea, sinus pressure, sinus pain, sore throat, trouble swallowing and voice change.    Eyes: Negative for pain, redness and visual disturbance.   Respiratory: Negative for cough, chest tightness, shortness of breath and wheezing.    Cardiovascular: Negative for chest pain, palpitations and leg swelling.   Gastrointestinal: Negative for abdominal distention, abdominal pain, blood in stool, constipation, diarrhea, nausea and vomiting.   Endocrine: Negative for cold intolerance and heat intolerance.   Genitourinary: Negative for difficulty urinating, dysuria, flank pain, frequency and hematuria.   Musculoskeletal: Negative for back pain, joint swelling, neck pain and neck stiffness.   Skin: Negative for rash and wound.   Allergic/Immunologic: Negative for immunocompromised state.   Neurological: Negative for dizziness, tremors, seizures, syncope, speech difficulty, weakness, light-headedness, numbness and headaches.   Hematological: Negative for adenopathy. Does not bruise/bleed easily.   Psychiatric/Behavioral: Negative for agitation, behavioral problems, confusion, hallucinations and sleep disturbance. The patient is not nervous/anxious.        Physical Exam  Vitals signs  and nursing note reviewed.   Constitutional:       General: He is not in acute distress.     Appearance: He is well-developed. He is not diaphoretic.   HENT:      Head: Normocephalic.      Right Ear: Tympanic membrane, ear canal and external ear normal.      Left Ear: Tympanic membrane, ear canal and external ear normal.      Nose: Nose normal.      Mouth/Throat:      Pharynx: No oropharyngeal exudate.   Eyes:      Conjunctiva/sclera: Conjunctivae normal.      Pupils: Pupils are equal, round, and reactive to light.   Neck:      Musculoskeletal: Normal range of motion and neck supple.      Thyroid: No thyroid mass or thyromegaly.      Vascular: Normal carotid pulses. Carotid bruit present. No JVD.      Trachea: No tracheal deviation.   Cardiovascular:      Rate and Rhythm: Normal rate and regular rhythm.      Heart sounds: Normal heart sounds. No murmur. No friction rub. No gallop.    Pulmonary:      Effort: Pulmonary effort is normal. No respiratory distress.      Breath sounds: Normal breath sounds. No stridor. No decreased breath sounds, wheezing, rhonchi or rales.   Abdominal:      General: Bowel sounds are normal. There is no distension.      Palpations: Abdomen is soft. There is no mass.      Tenderness: There is no abdominal tenderness. There is no guarding or rebound.      Hernia: No hernia is present.   Genitourinary:     Penis: Normal.       Testes: Normal.      Rectum: Normal.      Comments: Right lobe prostate slightly more prominent than the left  Musculoskeletal: Normal range of motion.      Right lower leg: No edema.      Left lower leg: No edema.   Lymphadenopathy:      Cervical: No cervical adenopathy.   Skin:     General: Skin is warm and dry.      Findings: No rash.   Neurological:      Mental Status: He is alert and oriented to person, place, and time.      Cranial Nerves: No cranial nerve deficit.      Sensory: No sensory deficit.      Motor: No abnormal muscle tone.      Coordination:  Coordination normal.      Deep Tendon Reflexes: Reflexes normal.         Assessment:      ICD-10-CM    1. Atherosclerosis of native coronary artery of native heart without angina pectoris  I25.10 simvastatin (ZOCOR) 80 MG tablet     Comprehensive metabolic panel     Lipid Profile     Lipid Profile     Comprehensive metabolic panel   2. Atrioventricular block, complete (H)  I44.2    3. Bladder outlet obstruction  N32.0    4. Stage 3b chronic kidney disease  N18.32    5. Essential hypertension  I10 atenolol (TENORMIN) 50 MG tablet   6. Hyperlipidemia, unspecified hyperlipidemia type  E78.5    7. Seborrheic dermatitis of scalp  L21.9    8. Bilateral carotid artery stenosis  I65.23    9. Bilateral sensorineural hearing loss  H90.3    10. Lower urinary tract symptoms (LUTS)  R39.9 tamsulosin (FLOMAX) 0.4 MG capsule        Plan: This patient appears to be relatively stable at this point in time.  His hearing is really quite poor and he needs to follow-up with ENT.  There is also a question as to whether or not he needs a nasopharyngeal cyst area biopsied.  That was reviewed with him today.  He is also due for a follow-up carotid evaluation, he is scheduled with vascular surgery upcoming to have this done and to review.  He is asymptomatic with his carotids at this time.  All other medical problems appear to be stable pending the results of his lab.  He will continue with regular pacemaker checks.  Medications are refilled for a year.  I will send him a letter with his laboratory results and any recommendations based on those findings.  He will follow up with me annually or sooner for problems.  42 minutes were spent with the patient today greater than 50% in counseling and review of care.

## 2021-01-13 NOTE — NURSING NOTE
"Chief Complaint   Patient presents with     Recheck Medication     Annual Review        Initial /78 (BP Location: Right arm, Patient Position: Chair, Cuff Size: Adult Regular)   Pulse 84   Temp 97  F (36.1  C) (Tympanic)   Resp 16   Ht 1.702 m (5' 7\")   Wt 69.3 kg (152 lb 12.8 oz)   SpO2 98%   BMI 23.93 kg/m   Estimated body mass index is 23.93 kg/m  as calculated from the following:    Height as of this encounter: 1.702 m (5' 7\").    Weight as of this encounter: 69.3 kg (152 lb 12.8 oz).  Medication Reconciliation: complete      Aishwarya Shannon LPN on 1/13/2021 at 9:08 AM   "

## 2021-01-13 NOTE — LETTER
January 13, 2021      Alvaro Bass Jr.   [address] [facility] [address]        Dear Alvaro,    Below are the results of your recent labs:    Results for orders placed or performed in visit on 01/13/21   Lipid Profile     Status: None   Result Value Ref Range    Cholesterol 139 <200 mg/dL    Triglycerides 92 <150 mg/dL    HDL Cholesterol 53 23 - 92 mg/dL    LDL Cholesterol Calculated 68 <100 mg/dL    Non HDL Cholesterol 86 <130 mg/dL   Comprehensive metabolic panel     Status: Abnormal   Result Value Ref Range    Sodium 133 (L) 134 - 144 mmol/L    Potassium 4.0 3.5 - 5.1 mmol/L    Chloride 97 (L) 98 - 107 mmol/L    Carbon Dioxide 25 21 - 31 mmol/L    Anion Gap 11 3 - 14 mmol/L    Glucose 120 (H) 70 - 105 mg/dL    Urea Nitrogen 20 7 - 25 mg/dL    Creatinine 1.54 (H) 0.70 - 1.30 mg/dL    GFR Estimate 43 (L) >60 mL/min/[1.73_m2]    GFR Estimate If Black 52 (L) >60 mL/min/[1.73_m2]    Calcium 10.0 8.6 - 10.3 mg/dL    Bilirubin Total 1.1 (H) 0.3 - 1.0 mg/dL    Albumin 4.5 3.5 - 5.7 g/dL    Protein Total 7.1 6.4 - 8.9 g/dL    Alkaline Phosphatase 48 34 - 104 U/L    ALT 12 7 - 52 U/L    AST 19 13 - 39 U/L        There are number of things labeled high or low with your blood tests but overall they really look fine.  I am satisfied with your results.  If you have any questions about any of your results, feel free to contact me.    Sincerely,        Jose Raul Morris MD  Internal Medicine  [facility]

## 2021-01-14 NOTE — PATIENT INSTRUCTIONS
Thank you for allowing Dr. Chahal and our ENT team to participate in your care.  If your medications are too expensive, please give the nurse a call.  We can possibly change this medication.  If you have a scheduling or an appointment question please contact our Health Unit Coordinator at their direct line [phone].   ALL nursing questions or concerns can be directed to your ENT nurse at: [phone] - Clara    Hold nose and lips closed and blow up your cheeks 2-3 times per day for a month to help clear hears.     Use the Elocon cream only to the eternal ear canal area. Wash hand afterwards and try to not touch your ears after applying. Use 2 times daily for 2 weeks.    Follow up in 6 month or sooner if needed.

## 2021-01-20 NOTE — PROGRESS NOTES
Otolaryngology Progress Note  Patient: Alvaro Bass Jr.  : 1935    Patient presents with:  RECHECK: Follow Up Left Chronic Otitis Media with Effusion, Eustachian Tube Dysfunction      HPI:  Alvaro Bass Jr. is a 85 year old male seen today for Left mixed hearing loss.      He continues to note left worse than right HL but improved c/w prior visit  Myringotomy did not seem to change his hearing, but audiogram demonstrates improved thresholds  I reviewed the audiogram with Alvaro below.    Hx occupational noise exposure in [place]  No bothersome tinnitus no vertigo no fluctuating hearing loss     Audiogram todays date shows 25 dB improvement in the left ear, SRT now 65 dB, improved thresholds 1000-1500hz on left.  Type A tymps each ear    Audiogram dated 2020 shows a moderate sloping to profound left mixed hearing loss SRT T 90 dB in the left 32% discrimination there is no concerning asymmetrical sensorineural hearing loss.  He has a right moderate sloping to severe to profound sensorineural loss SRT 55 dB in the right discrimination 28% type C tympanogram in the left , type A on the right.      Audiogram today's date shows o a slight remaining conductive loss in the left ear at 250 Hz and again at 3826K no concerning asymmetrical sensorineural hearing loss aside from 1 notch at 500 Hz type a tympanograms bilaterally.    SRT 55 dB right with 80% discrimination on the left SRT 65 dB with 76% discrimination    He saw Sowmya Del Rosario ENT PA on  and NP was performed and there is a left ET cyst    He also notes chronic dry itchy ears.  This is been a problem for over 5.  He denies chronic otorrhea years  Formerly saw dermatology given fluoinonide (lidex) for scalp.  He has not tried this on his chronically itchy ears          Current Outpatient Rx   Medication Sig Dispense Refill     amLODIPine (NORVASC) 2.5 MG tablet Take 1 tablet by mouth once daily 90 tablet 3     aspirin (GOODSENSE ASPIRIN) 325 MG  tablet Take 325 mg by mouth daily with food       atenolol (TENORMIN) 50 MG tablet Take 1 tablet (50 mg) by mouth daily 90 tablet 3     fluocinonide (LIDEX) 0.05 % external ointment Apply topically 2 times daily       hydrochlorothiazide (HYDRODIURIL) 25 MG tablet Take 1 tablet by mouth once daily 90 tablet 3     ketoconazole (NIZORAL) 2 % external shampoo USE 2-3 TIMES WEEKLY. LEAVE IN SCALP FOR 5 MINUTES PRIOR TO RINSING. 120 mL 0     losartan (COZAAR) 100 MG tablet Take 1 tablet by mouth once daily 90 tablet 0     psyllium (TGT PSYLLIUM FIBER) 0.52 G capsule Take 1 capsule by mouth At Bedtime       simvastatin (ZOCOR) 80 MG tablet Take 0.5 tablets (40 mg) by mouth daily 45 tablet 3     tamsulosin (FLOMAX) 0.4 MG capsule Take 1 capsule (0.4 mg) by mouth daily with food 90 capsule 3     triamcinolone (KENALOG) 0.1 % external ointment Apply topically 3 times daily 80 g 2       Allergies: Patient has no known allergies.     Past Medical History:   Diagnosis Date     Atherosclerotic heart disease of native coronary artery without angina pectoris     No Comments Provided     Atrioventricular block, complete (H)     No Comments Provided     Bladder neck obstruction     No Comments Provided     Chronic kidney disease, stage III (moderate)     8/21/2014     Diverticulosis of large intestine without perforation or abscess without bleeding     3/9/2015     Essential (primary) hypertension     No Comments Provided     History of colonic polyps     2000,not adenomatous     Hyperlipidemia     No Comments Provided     Non-ST elevation (NSTEMI) myocardial infarction (H)     2/28/1999,with favorable post myocardial infarction thallium studies.     Occlusion and stenosis of unspecified carotid artery     2/2012,Moderate <60% bilateral on US       Past Surgical History:   Procedure Laterality Date     APPENDECTOMY OPEN  1953     COLONOSCOPY      2/4/2010,Numerous polyps-all hyperplastic.  Next due 2015.     COLONOSCOPY       3/9/15,F/U N/A age     CYSTOSCOPY      No Comments Provided     EXTRACAPSULAR CATARACT EXTRATION WITH INTRAOCULAR LENS IMPLANT Bilateral      HEART CATH, ANGIOPLASTY      ,right coronary artery     HERNIA REPAIR Right 2016    LAPAROSCOPIC INCARCERATED INGUINAL HERNIA REPAIR     IMPLANT PACEMAKER      2005,Dual chamber pacemaker, [employer], model:  E2ER01     REPLACE PACEMAKER GENERATOR      2013       ENT family history reviewed    Social History     Tobacco Use     Smoking status: Former Smoker     Types: Cigarettes     Quit date: 1999     Years since quittin.0     Smokeless tobacco: Never Used   Substance Use Topics     Alcohol use: No     Alcohol/week: 0.0 standard drinks     Drug use: No     Comment: Drug use: No       Review of Systems  ROS: 10 point ROS neg other than the symptoms noted above in the HPI     Physical Exam  /66   Pulse 65   Temp 96.2  F (35.7  C) (Tympanic)   Wt 68 kg (150 lb)   SpO2 99%   BMI 23.49 kg/m    General - The patient is well nourished and well developed, and appears to have good nutritional status.  Alert and oriented to person and place, answers questions and cooperates with examination appropriately.   Head and Face - Normocephalic and atraumatic, with no gross asymmetry noted.  The facial nerve is intact, with strong symmetric movements.  Voice and Breathing - The patient was breathing comfortably without the use of accessory muscles. There was no wheezing, stridor, or stertor.  The patients voice was clear and strong, and had appropriate pitch and quality.  No rodney peripheral digital clubbing or cyanosis   Ears -examined under microscopy bilaterally  Bilateral moderate to severe eczematous changes of the conchal bowl worse on the right   the external auditory canals are patent, the right tympanic membrane is intact without effusion, retraction or mass.  Bony landmarks are intact.  Slight remaining serous fluid left, no rodney effusion, no  retraction.  Eyes - Extraocular movements intact, and the pupils were reactive to light.  Sclera were not icteric or injected, conjunctiva were pink and moist.  Mouth - Examination of the oral cavity showed pink, healthy oral mucosa. No lesions or ulcerations noted.  The tongue was mobile and midline, and the dentition were in good condition.  Upper dentures removed no mass  Throat - The walls of the oropharynx were smooth, pink, moist, symmetric, and had no lesions or ulcerations.  The tonsillar pillars and soft palate were symmetric.  The uvula was midline on elevation.    Neck - No palpable enlarged fixed cervical lymph nodes.  No neck cysts or unusual tenderness to palpation.   No palpable fixed thyroid nodules or concerning goiter.  The trachea is grossly midline.   Nose - External contour is symmetric, no gross deflection or scars.  Nasal mucosa is pink and moist with no abnormal mucus.  The septum and turbinates were evaluated: DNS.  No polyps, masses, or purulence noted on examination.      After informed consent was obtained and the nose was anesthetized with topical neosynepherine/lidocaine, the scope was advanced into the nares.  There is no purulence and/ or excessive swelling.  Eustachian tubes are patent, no nasopharyngeal mass.  Small 3-4 mm pinpoint NP mucosal cyst at cephalad left nasopharynx, no concerning mass        Impression and Plan- Alvaro ZACHARIAH Fletcherguillermo Harrison is a 85 year old male with:    ICD-10-CM    1. Chronic eczematous otitis externa of both ears  H60.8X3 mometasone (ELOCON) 0.1 % external cream   2. Mixed conductive and sensorineural hearing loss of left ear with restricted hearing of right ear  H90.A32    3. ETD (Eustachian tube dysfunction), left  H69.82    4. Chronic otitis media with effusion, left  H65.492     resolving         Valsalva bid-tid x 1 month    Use the Elocon cream only to the eternal ear canal area. Wash hand afterwards and try to not touch your ears after applying. Use 2 times  daily for 2 weeks.    RTC prn  Reassured, no concerning nasopharyngeal mass    If effusions recur present back for office myringotomy  Present back for audiogram with any sudden change in hearing otherwise recommend repeat audiogram and exam annually    Hearing preservation reinforced       Terra Chahal D.O.  Otolaryngology/Head and Neck Surgery  Allergy

## 2021-01-21 ENCOUNTER — OFFICE VISIT (OUTPATIENT)
Dept: AUDIOLOGY | Facility: OTHER | Age: 86
End: 2021-01-21
Attending: AUDIOLOGIST
Payer: MEDICARE

## 2021-01-21 ENCOUNTER — OFFICE VISIT (OUTPATIENT)
Dept: OTOLARYNGOLOGY | Facility: OTHER | Age: 86
End: 2021-01-21
Attending: AUDIOLOGIST
Payer: MEDICARE

## 2021-01-21 VITALS
DIASTOLIC BLOOD PRESSURE: 66 MMHG | OXYGEN SATURATION: 99 % | TEMPERATURE: 96.2 F | HEART RATE: 65 BPM | BODY MASS INDEX: 23.49 KG/M2 | SYSTOLIC BLOOD PRESSURE: 124 MMHG | WEIGHT: 150 LBS

## 2021-01-21 DIAGNOSIS — H90.A32 MIXED CONDUCTIVE AND SENSORINEURAL HEARING LOSS OF LEFT EAR WITH RESTRICTED HEARING OF RIGHT EAR: Primary | ICD-10-CM

## 2021-01-21 DIAGNOSIS — H90.A21 SENSORINEURAL HEARING LOSS (SNHL) OF RIGHT EAR WITH RESTRICTED HEARING OF LEFT EAR: ICD-10-CM

## 2021-01-21 DIAGNOSIS — H60.8X3 CHRONIC ECZEMATOUS OTITIS EXTERNA OF BOTH EARS: Primary | ICD-10-CM

## 2021-01-21 DIAGNOSIS — H90.A32 MIXED CONDUCTIVE AND SENSORINEURAL HEARING LOSS OF LEFT EAR WITH RESTRICTED HEARING OF RIGHT EAR: ICD-10-CM

## 2021-01-21 DIAGNOSIS — H69.92 ETD (EUSTACHIAN TUBE DYSFUNCTION), LEFT: ICD-10-CM

## 2021-01-21 DIAGNOSIS — H91.93 DECREASED HEARING OF BOTH EARS: ICD-10-CM

## 2021-01-21 DIAGNOSIS — H65.492 CHRONIC OTITIS MEDIA WITH EFFUSION, LEFT: ICD-10-CM

## 2021-01-21 PROCEDURE — 92504 EAR MICROSCOPY EXAMINATION: CPT | Performed by: OTOLARYNGOLOGY

## 2021-01-21 PROCEDURE — 92550 TYMPANOMETRY & REFLEX THRESH: CPT | Performed by: AUDIOLOGIST

## 2021-01-21 PROCEDURE — 92511 NASOPHARYNGOSCOPY: CPT | Performed by: OTOLARYNGOLOGY

## 2021-01-21 PROCEDURE — G0463 HOSPITAL OUTPT CLINIC VISIT: HCPCS | Mod: 25

## 2021-01-21 PROCEDURE — 99213 OFFICE O/P EST LOW 20 MIN: CPT | Mod: 25 | Performed by: OTOLARYNGOLOGY

## 2021-01-21 PROCEDURE — 92557 COMPREHENSIVE HEARING TEST: CPT | Performed by: AUDIOLOGIST

## 2021-01-21 RX ORDER — MOMETASONE FUROATE 1 MG/G
CREAM TOPICAL
Qty: 45 G | Refills: 2 | Status: SHIPPED | OUTPATIENT
Start: 2021-01-21

## 2021-01-21 ASSESSMENT — PAIN SCALES - GENERAL: PAINLEVEL: NO PAIN (0)

## 2021-01-21 NOTE — NURSING NOTE
"Chief Complaint   Patient presents with     RECHECK     Follow Up Left Chronic Otitis Media with Effusion, Eustachian Tube Dysfunction       Initial /66   Pulse 65   Temp 96.2  F (35.7  C) (Tympanic)   Wt 68 kg (150 lb)   SpO2 99%   BMI 23.49 kg/m   Estimated body mass index is 23.49 kg/m  as calculated from the following:    Height as of 1/13/21: 1.702 m (5' 7\").    Weight as of this encounter: 68 kg (150 lb).  Medication Reconciliation: complete  Sammie Wild LPN  "

## 2021-01-21 NOTE — LETTER
2021         RE: Alvaro Bass Jr.  [address]  [address] [facility]  [place] [address]        Dear Colleague,    Thank you for referring your patient, Alvaro Bass Jr., to the [facility]. Please see a copy of my visit note below.    Otolaryngology Progress Note  Patient: Alvaro Bass Jr.  : 1935    Patient presents with:  RECHECK: Follow Up Left Chronic Otitis Media with Effusion, Eustachian Tube Dysfunction      HPI:  Alvaro Bass Jr. is a 85 year old male seen today for Left mixed hearing loss.      He continues to note left worse than right HL but improved c/w prior visit  Myringotomy did not seem to change his hearing, but audiogram demonstrates improved thresholds  I reviewed the audiogram with Alvaro below.    Hx occupational noise exposure in [place] war  No bothersome tinnitus no vertigo no fluctuating hearing loss     Audiogram todays date shows 25 dB improvement in the left ear, SRT now 65 dB, improved thresholds 1000-1500hz on left.  Type A tymps each ear    Audiogram dated 2020 shows a moderate sloping to profound left mixed hearing loss SRT T 90 dB in the left 32% discrimination there is no concerning asymmetrical sensorineural hearing loss.  He has a right moderate sloping to severe to profound sensorineural loss SRT 55 dB in the right discrimination 28% type C tympanogram in the left , type A on the right.      Audiogram today's date shows o a slight remaining conductive loss in the left ear at 250 Hz and again at 3826K no concerning asymmetrical sensorineural hearing loss aside from 1 notch at 500 Hz type a tympanograms bilaterally.    SRT 55 dB right with 80% discrimination on the left SRT 65 dB with 76% discrimination    He saw Sowmya Del Rosario ENT PA on  and NP was performed and there is a left ET cyst    He also notes chronic dry itchy ears.  This is been a problem for over 5.  He denies chronic otorrhea years  Formerly saw dermatology given fluoinonide  (lidex) for scalp.  He has not tried this on his chronically itchy ears          Current Outpatient Rx   Medication Sig Dispense Refill     amLODIPine (NORVASC) 2.5 MG tablet Take 1 tablet by mouth once daily 90 tablet 3     aspirin (GOODSENSE ASPIRIN) 325 MG tablet Take 325 mg by mouth daily with food       atenolol (TENORMIN) 50 MG tablet Take 1 tablet (50 mg) by mouth daily 90 tablet 3     fluocinonide (LIDEX) 0.05 % external ointment Apply topically 2 times daily       hydrochlorothiazide (HYDRODIURIL) 25 MG tablet Take 1 tablet by mouth once daily 90 tablet 3     ketoconazole (NIZORAL) 2 % external shampoo USE 2-3 TIMES WEEKLY. LEAVE IN SCALP FOR 5 MINUTES PRIOR TO RINSING. 120 mL 0     losartan (COZAAR) 100 MG tablet Take 1 tablet by mouth once daily 90 tablet 0     psyllium (TGT PSYLLIUM FIBER) 0.52 G capsule Take 1 capsule by mouth At Bedtime       simvastatin (ZOCOR) 80 MG tablet Take 0.5 tablets (40 mg) by mouth daily 45 tablet 3     tamsulosin (FLOMAX) 0.4 MG capsule Take 1 capsule (0.4 mg) by mouth daily with food 90 capsule 3     triamcinolone (KENALOG) 0.1 % external ointment Apply topically 3 times daily 80 g 2       Allergies: Patient has no known allergies.     Past Medical History:   Diagnosis Date     Atherosclerotic heart disease of native coronary artery without angina pectoris     No Comments Provided     Atrioventricular block, complete (H)     No Comments Provided     Bladder neck obstruction     No Comments Provided     Chronic kidney disease, stage III (moderate)     8/21/2014     Diverticulosis of large intestine without perforation or abscess without bleeding     3/9/2015     Essential (primary) hypertension     No Comments Provided     History of colonic polyps     2000,not adenomatous     Hyperlipidemia     No Comments Provided     Non-ST elevation (NSTEMI) myocardial infarction (H)     2/28/1999,with favorable post myocardial infarction thallium studies.     Occlusion and stenosis of  unspecified carotid artery     2012,Moderate <60% bilateral on US       Past Surgical History:   Procedure Laterality Date     APPENDECTOMY OPEN       COLONOSCOPY      2010,Numerous polyps-all hyperplastic.  Next due .     COLONOSCOPY      3/9/15,F/U N/A age     CYSTOSCOPY      No Comments Provided     EXTRACAPSULAR CATARACT EXTRATION WITH INTRAOCULAR LENS IMPLANT Bilateral      HEART CATH, ANGIOPLASTY      ,right coronary artery     HERNIA REPAIR Right 2016    LAPAROSCOPIC INCARCERATED INGUINAL HERNIA REPAIR     IMPLANT PACEMAKER      2005,Dual chamber pacemaker, [employer], model:  E2ER01     REPLACE PACEMAKER GENERATOR      2013       ENT family history reviewed    Social History     Tobacco Use     Smoking status: Former Smoker     Types: Cigarettes     Quit date: 1999     Years since quittin.0     Smokeless tobacco: Never Used   Substance Use Topics     Alcohol use: No     Alcohol/week: 0.0 standard drinks     Drug use: No     Comment: Drug use: No       Review of Systems  ROS: 10 point ROS neg other than the symptoms noted above in the HPI     Physical Exam  /66   Pulse 65   Temp 96.2  F (35.7  C) (Tympanic)   Wt 68 kg (150 lb)   SpO2 99%   BMI 23.49 kg/m    General - The patient is well nourished and well developed, and appears to have good nutritional status.  Alert and oriented to person and place, answers questions and cooperates with examination appropriately.   Head and Face - Normocephalic and atraumatic, with no gross asymmetry noted.  The facial nerve is intact, with strong symmetric movements.  Voice and Breathing - The patient was breathing comfortably without the use of accessory muscles. There was no wheezing, stridor, or stertor.  The patients voice was clear and strong, and had appropriate pitch and quality.  No rodney peripheral digital clubbing or cyanosis   Ears -examined under microscopy bilaterally  Bilateral moderate to severe eczematous changes  of the conchal bowl worse on the right   the external auditory canals are patent, the right tympanic membrane is intact without effusion, retraction or mass.  Bony landmarks are intact.  Slight remaining serous fluid left, no rodney effusion, no retraction.  Eyes - Extraocular movements intact, and the pupils were reactive to light.  Sclera were not icteric or injected, conjunctiva were pink and moist.  Mouth - Examination of the oral cavity showed pink, healthy oral mucosa. No lesions or ulcerations noted.  The tongue was mobile and midline, and the dentition were in good condition.  Upper dentures removed no mass  Throat - The walls of the oropharynx were smooth, pink, moist, symmetric, and had no lesions or ulcerations.  The tonsillar pillars and soft palate were symmetric.  The uvula was midline on elevation.    Neck - No palpable enlarged fixed cervical lymph nodes.  No neck cysts or unusual tenderness to palpation.   No palpable fixed thyroid nodules or concerning goiter.  The trachea is grossly midline.   Nose - External contour is symmetric, no gross deflection or scars.  Nasal mucosa is pink and moist with no abnormal mucus.  The septum and turbinates were evaluated: DNS.  No polyps, masses, or purulence noted on examination.      After informed consent was obtained and the nose was anesthetized with topical neosynepherine/lidocaine, the scope was advanced into the nares.  There is no purulence and/ or excessive swelling.  Eustachian tubes are patent, no nasopharyngeal mass.  Small 3-4 mm pinpoint NP mucosal cyst at cephalad left nasopharynx, no concerning mass        Impression and Plan- Alvaro ZACHARIAH Graf Harrison is a 85 year old male with:    ICD-10-CM    1. Chronic eczematous otitis externa of both ears  H60.8X3 mometasone (ELOCON) 0.1 % external cream   2. Mixed conductive and sensorineural hearing loss of left ear with restricted hearing of right ear  H90.A32    3. ETD (Eustachian tube dysfunction), left  H69.82     4. Chronic otitis media with effusion, left  H65.492     resolving         Valsalva bid-tid x 1 month    Use the Elocon cream only to the eternal ear canal area. Wash hand afterwards and try to not touch your ears after applying. Use 2 times daily for 2 weeks.    RTC prn  Reassured, no concerning nasopharyngeal mass    If effusions recur present back for office myringotomy  Present back for audiogram with any sudden change in hearing otherwise recommend repeat audiogram and exam annually    Hearing preservation reinforced       Terra Chahal D.O.  Otolaryngology/Head and Neck Surgery  Allergy        Again, thank you for allowing me to participate in the care of your patient.        Sincerely,        Terra Chahal MD

## 2021-01-21 NOTE — PROGRESS NOTES
Audiology Evaluation Completed. Please refer SCANNED AUDIOGRAM and/or TYMPANOGRAM for BACKGROUND, RESULTS, RECOMMENDATIONS.      Claudia PEREZ, [facility]  Audiologist #[id number]

## 2021-01-31 ENCOUNTER — MYC MEDICAL ADVICE (OUTPATIENT)
Dept: INTERNAL MEDICINE | Facility: OTHER | Age: 86
End: 2021-01-31

## 2021-02-08 ENCOUNTER — MYC MEDICAL ADVICE (OUTPATIENT)
Dept: INTERNAL MEDICINE | Facility: OTHER | Age: 86
End: 2021-02-08

## 2021-02-16 ENCOUNTER — TRANSFERRED RECORDS (OUTPATIENT)
Dept: HEALTH INFORMATION MANAGEMENT | Facility: OTHER | Age: 86
End: 2021-02-16

## 2021-03-08 ENCOUNTER — MYC MEDICAL ADVICE (OUTPATIENT)
Dept: INTERNAL MEDICINE | Facility: OTHER | Age: 86
End: 2021-03-08

## 2021-03-16 DIAGNOSIS — I10 ESSENTIAL HYPERTENSION: ICD-10-CM

## 2021-03-17 RX ORDER — LOSARTAN POTASSIUM 100 MG/1
TABLET ORAL
Qty: 90 TABLET | Refills: 2 | Status: SHIPPED | OUTPATIENT
Start: 2021-03-17 | End: 2021-12-24

## 2021-03-17 NOTE — TELEPHONE ENCOUNTER
"[facility] sent Rx request for the following:      Requested Prescriptions   Pending Prescriptions Disp Refills     losartan (COZAAR) 100 MG tablet [Pharmacy Med Name: Losartan Potassium 100 MG Oral Tablet] 90 tablet 0     Sig: Take 1 tablet by mouth once daily       Angiotensin-II Receptors Failed - 3/16/2021  3:39 PM        Failed - Normal serum creatinine on file in past 12 months     Recent Labs   Lab Test 01/13/21  0940   CR 1.54*       Ok to refill medication if creatinine is low          Per result letter sent to pt on 1/13     \"There are number of things labeled high or low with your blood tests but overall they really look fine.  I am satisfied with your results.  If you have any questions about any of your results, feel free to contact me.\"         Last Prescription Date:   12/14/2020  Last Fill Qty/Refills:         90, R-0  Last Office Visit:              1/13/2021  Future Office visit:           none        Prescription approved per [facility] Refill Protocol for 90 days and 2 refills.       Ami Murray RN on 3/17/2021 at 3:31 PM    "

## 2021-04-11 ENCOUNTER — MYC MEDICAL ADVICE (OUTPATIENT)
Dept: INTERNAL MEDICINE | Facility: OTHER | Age: 86
End: 2021-04-11

## 2021-08-15 ENCOUNTER — MYC MEDICAL ADVICE (OUTPATIENT)
Dept: INTERNAL MEDICINE | Facility: OTHER | Age: 86
End: 2021-08-15

## 2021-08-16 NOTE — TELEPHONE ENCOUNTER
On further review, he probably should not take naproxen or ibuprofen or anything similar due to the weakness of his kidneys.  Tylenol will be fine.

## 2021-10-23 ENCOUNTER — HEALTH MAINTENANCE LETTER (OUTPATIENT)
Age: 86
End: 2021-10-23

## 2021-10-26 ENCOUNTER — MYC MEDICAL ADVICE (OUTPATIENT)
Dept: INTERNAL MEDICINE | Facility: OTHER | Age: 86
End: 2021-10-26

## 2021-10-27 NOTE — TELEPHONE ENCOUNTER
Chart updated. verified in [facility].   Sowmya Arellano LPN .............10/27/2021     10:19 AM

## 2021-12-23 DIAGNOSIS — I10 ESSENTIAL HYPERTENSION: ICD-10-CM

## 2021-12-24 RX ORDER — LOSARTAN POTASSIUM 100 MG/1
TABLET ORAL
Qty: 90 TABLET | Refills: 0 | Status: SHIPPED | OUTPATIENT
Start: 2021-12-24 | End: 2022-02-07

## 2021-12-24 NOTE — TELEPHONE ENCOUNTER
Losartan Potassium 100 MG Oral Tablet     Last Written Prescription Date:  3/17/21  Last Fill Quantity: 90,   # refills: 2  Last Office Visit: 1/13/21  Future Office visit:       Routing refill request to provider for review/approval because:  Drug failed the [facility] or [facility] refill protocol.   Angiotensin-II Receptors Failed 12/23/2021 12:39 PM   Protocol Details  Normal serum creatinine on file in past 12 months      Unable to complete prescription refill per RNMedication Refill Policy.................... Tiffany Burciaga RN ....................  12/24/2021   10:52 AM

## 2022-01-14 DIAGNOSIS — I10 HYPERTENSION, UNSPECIFIED TYPE: ICD-10-CM

## 2022-01-14 NOTE — LETTER
January 17, 2022      Alvaro Bass Jr.     [address] [facility] [address]      Dear Alvaro,       This letter is to remind you that you are due for your annual office visit and labs. Your last office visit with Dr. Morris was on 01/13/2021      Please call the clinic at [phone] and schedule your appointment.       Thank you,    The Refill Nurse  [facility]

## 2022-01-17 RX ORDER — HYDROCHLOROTHIAZIDE 25 MG/1
TABLET ORAL
Qty: 90 TABLET | Refills: 0 | Status: SHIPPED | OUTPATIENT
Start: 2022-01-17 | End: 2022-02-07

## 2022-02-04 ASSESSMENT — ACTIVITIES OF DAILY LIVING (ADL): CURRENT_FUNCTION: NO ASSISTANCE NEEDED

## 2022-02-07 ENCOUNTER — OFFICE VISIT (OUTPATIENT)
Dept: INTERNAL MEDICINE | Facility: OTHER | Age: 87
End: 2022-02-07
Attending: INTERNAL MEDICINE
Payer: MEDICARE

## 2022-02-07 VITALS
DIASTOLIC BLOOD PRESSURE: 72 MMHG | SYSTOLIC BLOOD PRESSURE: 134 MMHG | WEIGHT: 151.2 LBS | TEMPERATURE: 97.5 F | HEART RATE: 104 BPM | RESPIRATION RATE: 20 BRPM | BODY MASS INDEX: 23.68 KG/M2 | OXYGEN SATURATION: 93 %

## 2022-02-07 DIAGNOSIS — E78.5 HYPERLIPIDEMIA, UNSPECIFIED HYPERLIPIDEMIA TYPE: Primary | ICD-10-CM

## 2022-02-07 DIAGNOSIS — I65.23 BILATERAL CAROTID ARTERY STENOSIS: ICD-10-CM

## 2022-02-07 DIAGNOSIS — N18.32 STAGE 3B CHRONIC KIDNEY DISEASE (H): ICD-10-CM

## 2022-02-07 DIAGNOSIS — R39.9 LOWER URINARY TRACT SYMPTOMS (LUTS): ICD-10-CM

## 2022-02-07 DIAGNOSIS — I25.10 ATHEROSCLEROSIS OF NATIVE CORONARY ARTERY OF NATIVE HEART WITHOUT ANGINA PECTORIS: ICD-10-CM

## 2022-02-07 DIAGNOSIS — I10 ESSENTIAL HYPERTENSION: ICD-10-CM

## 2022-02-07 LAB
ALBUMIN SERPL-MCNC: 4.8 G/DL (ref 3.5–5.7)
ALP SERPL-CCNC: 45 U/L (ref 34–104)
ALT SERPL W P-5'-P-CCNC: 12 U/L (ref 7–52)
ANION GAP SERPL CALCULATED.3IONS-SCNC: 9 MMOL/L (ref 3–14)
AST SERPL W P-5'-P-CCNC: 16 U/L (ref 13–39)
BILIRUB SERPL-MCNC: 0.9 MG/DL (ref 0.3–1)
BUN SERPL-MCNC: 31 MG/DL (ref 7–25)
CALCIUM SERPL-MCNC: 10.1 MG/DL (ref 8.6–10.3)
CHLORIDE BLD-SCNC: 98 MMOL/L (ref 98–107)
CHOLEST SERPL-MCNC: 120 MG/DL
CO2 SERPL-SCNC: 27 MMOL/L (ref 21–31)
CREAT SERPL-MCNC: 1.67 MG/DL (ref 0.7–1.3)
FASTING STATUS PATIENT QL REPORTED: NORMAL
GFR SERPL CREATININE-BSD FRML MDRD: 40 ML/MIN/1.73M2
GLUCOSE BLD-MCNC: 108 MG/DL (ref 70–105)
HDLC SERPL-MCNC: 51 MG/DL (ref 23–92)
LDLC SERPL CALC-MCNC: 52 MG/DL
NONHDLC SERPL-MCNC: 69 MG/DL
POTASSIUM BLD-SCNC: 4.1 MMOL/L (ref 3.5–5.1)
PROT SERPL-MCNC: 6.8 G/DL (ref 6.4–8.9)
SODIUM SERPL-SCNC: 134 MMOL/L (ref 134–144)
TRIGL SERPL-MCNC: 87 MG/DL

## 2022-02-07 PROCEDURE — 80061 LIPID PANEL: CPT | Mod: ZL | Performed by: INTERNAL MEDICINE

## 2022-02-07 PROCEDURE — G0463 HOSPITAL OUTPT CLINIC VISIT: HCPCS

## 2022-02-07 PROCEDURE — G0439 PPPS, SUBSEQ VISIT: HCPCS | Performed by: INTERNAL MEDICINE

## 2022-02-07 PROCEDURE — 80053 COMPREHEN METABOLIC PANEL: CPT | Mod: ZL | Performed by: INTERNAL MEDICINE

## 2022-02-07 PROCEDURE — 36415 COLL VENOUS BLD VENIPUNCTURE: CPT | Mod: ZL | Performed by: INTERNAL MEDICINE

## 2022-02-07 RX ORDER — HYDROCHLOROTHIAZIDE 25 MG/1
25 TABLET ORAL DAILY
Qty: 90 TABLET | Refills: 3 | Status: SHIPPED | OUTPATIENT
Start: 2022-02-07 | End: 2023-02-15

## 2022-02-07 RX ORDER — AMLODIPINE BESYLATE 2.5 MG/1
2.5 TABLET ORAL DAILY
Qty: 90 TABLET | Refills: 3 | Status: SHIPPED | OUTPATIENT
Start: 2022-02-07 | End: 2023-02-15

## 2022-02-07 RX ORDER — TAMSULOSIN HYDROCHLORIDE 0.4 MG/1
0.4 CAPSULE ORAL
Qty: 90 CAPSULE | Refills: 3 | Status: SHIPPED | OUTPATIENT
Start: 2022-02-07 | End: 2023-02-15

## 2022-02-07 RX ORDER — ATENOLOL 50 MG/1
50 TABLET ORAL DAILY
Qty: 90 TABLET | Refills: 3 | Status: SHIPPED | OUTPATIENT
Start: 2022-02-07 | End: 2023-02-15

## 2022-02-07 RX ORDER — SIMVASTATIN 80 MG
40 TABLET ORAL DAILY
Qty: 45 TABLET | Refills: 3 | Status: SHIPPED | OUTPATIENT
Start: 2022-02-07 | End: 2023-02-15

## 2022-02-07 RX ORDER — LOSARTAN POTASSIUM 100 MG/1
100 TABLET ORAL DAILY
Qty: 90 TABLET | Refills: 3 | Status: SHIPPED | OUTPATIENT
Start: 2022-02-07 | End: 2023-02-15

## 2022-02-07 ASSESSMENT — ACTIVITIES OF DAILY LIVING (ADL): CURRENT_FUNCTION: NO ASSISTANCE NEEDED

## 2022-02-07 ASSESSMENT — PAIN SCALES - GENERAL: PAINLEVEL: NO PAIN (0)

## 2022-02-07 NOTE — PROGRESS NOTES
"SUBJECTIVE:   Alvaro Bass Jr. is a 86 year old male who presents for Preventive Visit.    He is here today for Medicare wellness visit.  In most respects he tells me that he is feeling fine.  He has a history of atherosclerotic coronary artery disease with previous intervention.  He has no cardiac symptoms at all and does not need to use nitroglycerin.  He has a history of bilateral carotid artery disease and continues to get annual carotid ultrasound for monitoring.  He has no symptoms associated with this.  He has treated hypertension and treated hyperlipidemia.  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    He has some lower urinary tract symptoms for which he takes Flomax with good results.  He has a lot of trouble with dermatitis but has some creams and lotions that he uses with good results.  He is otherwise quite healthy for his age.    Medications are reconciled.  Past medical history, past surgical history, family history and social histories are reviewed and updated.  Immunizations are up-to-date.    Patient has been advised of split billing requirements and indicates understanding: Yes  Are you in the first 12 months of your Medicare coverage?  No    Healthy Habits:     In general, how would you rate your overall health?  Good    Frequency of exercise:  None    Do you usually eat at least 4 servings of fruit and vegetables a day, include whole grains    & fiber and avoid regularly eating high fat or \"junk\" foods?  No    Taking medications regularly:  Yes    Medication side effects:  None    Ability to successfully perform activities of daily living:  No assistance needed    Home Safety:  Lack of grab bars in the bathroom    Hearing Impairment:  Difficulty following a conversation in a noisy restaurant or crowded room and difficulty understanding speech on the telephone    In the past 6 months, have you been bothered by leaking of urine?  No    In " general, how would you rate your overall mental or emotional health?  Excellent      PHQ-2 Total Score: 0    Additional concerns today:  No    Do you feel safe in your environment? Yes    Have you ever done Advance Care Planning? (For example, a Health Directive, POLST, or a discussion with a medical provider or your loved ones about your wishes): Yes, advance care planning is on file.       Fall risk  Fallen 2 or more times in the past year?: No  Any fall with injury in the past year?: No    Cognitive Screening   1) Repeat 3 items (Leader, Season, Table)    2) Clock draw: NORMAL  3) 3 item recall: Recalls 1 object   Results: NORMAL clock, 1-2 items recalled: COGNITIVE IMPAIRMENT LESS LIKELY    Mini-CogTM Copyright YOAN Myers. Licensed by the author for use in [facility]; reprinted with permission (frida@[facility]). All rights reserved.      Do you have sleep apnea, excessive snoring or daytime drowsiness?: no    Reviewed and updated as needed this visit by clinical staff  Tobacco  Allergies  Meds  Problems  Med Hx  Surg Hx  Fam Hx         Reviewed and updated as needed this visit by Provider  Tobacco  Allergies  Meds  Problems  Med Hx  Surg Hx  Fam Hx        Social History     Tobacco Use     Smoking status: Former Smoker     Types: Cigarettes     Quit date: 1999     Years since quittin.1     Smokeless tobacco: Never Used   Substance Use Topics     Alcohol use: No     Alcohol/week: 0.0 standard drinks         Alcohol Use 2022   Prescreen: >3 drinks/day or >7 drinks/week? Not Applicable           Current Outpatient Medications   Medication     amLODIPine (NORVASC) 2.5 MG tablet     aspirin (GOODSENSE ASPIRIN) 325 MG tablet     atenolol (TENORMIN) 50 MG tablet     fluocinonide (LIDEX) 0.05 % external ointment     hydrochlorothiazide (HYDRODIURIL) 25 MG tablet     ketoconazole (NIZORAL) 2 % external shampoo     losartan (COZAAR) 100 MG tablet     mometasone (ELOCON) 0.1 % external  "cream     psyllium (TGT PSYLLIUM FIBER) 0.52 G capsule     simvastatin (ZOCOR) 80 MG tablet     tamsulosin (FLOMAX) 0.4 MG capsule     triamcinolone (KENALOG) 0.1 % external ointment     No current facility-administered medications for this visit.         Current providers sharing in care for this patient include:   Patient Care Team:  Jose Raul Morris MD as PCP - General (Internal Medicine)  Jose Raul Morris MD as Assigned PCP  Terra Chahal MD as Assigned Surgical Provider    The following health maintenance items are reviewed in Epic and correct as of today:  Health Maintenance Due   Topic Date Due     BMP  01/13/2022     LIPID  01/13/2022     Lab work is in process          Review of Systems  Constitutional, HEENT, cardiovascular, pulmonary, GI, , musculoskeletal, neuro, skin, endocrine and psych systems are negative, except as otherwise noted.    OBJECTIVE:   /72   Pulse 104   Temp 97.5  F (36.4  C) (Tympanic)   Resp 20   Wt 68.6 kg (151 lb 3.2 oz)   SpO2 93%   BMI 23.68 kg/m   Estimated body mass index is 23.68 kg/m  as calculated from the following:    Height as of 1/13/21: 1.702 m (5' 7\").    Weight as of this encounter: 68.6 kg (151 lb 3.2 oz).  Physical Exam  GENERAL: healthy, alert and no distress  EYES: Eyes grossly normal to inspection, PERRL and conjunctivae and sclerae normal  HENT: ear canals and TM's normal, nose and mouth without ulcers or lesions  NECK: no adenopathy, no asymmetry, masses, or scars and thyroid normal to palpation.  Bilateral carotid bruits  RESP: lungs clear to auscultation - no rales, rhonchi or wheezes  CV: regular rate and rhythm, normal S1 S2, no S3 or S4, no murmur, click or rub, no peripheral edema and peripheral pulses strong  ABDOMEN: soft, nontender, no hepatosplenomegaly, no masses and bowel sounds normal  : Normal male, no hernia.  Prostate 3-4+ without nodularity  MS: no gross musculoskeletal defects noted, no edema  SKIN: no suspicious lesions or " "rashes  NEURO: Normal strength and tone, mentation intact and speech normal  PSYCH: mentation appears normal, affect normal/bright        ASSESSMENT / PLAN:       ICD-10-CM    1. Hyperlipidemia, unspecified hyperlipidemia type  E78.5    2. Essential hypertension  I10 atenolol (TENORMIN) 50 MG tablet     losartan (COZAAR) 100 MG tablet   3. Lower urinary tract symptoms (LUTS)  R39.9 tamsulosin (FLOMAX) 0.4 MG capsule   4. Atherosclerosis of native coronary artery of native heart without angina pectoris  I25.10 simvastatin (ZOCOR) 80 MG tablet   5. Bilateral carotid artery stenosis  I65.23    6. Stage 3b chronic kidney disease (H)  N18.32        Patient has been advised of split billing requirements and indicates understanding: Yes    COUNSELING:    He appears to be doing well.  His exam today is stable.  Medications will continue without change.  Lab work pending, I will send him a [employer] message with the results.  Encouraged him to continue getting annual carotid ultrasound as well as continue with every 3-month pacemaker checks.  Assuming all goes well, follow-up with me annually    Reviewed preventive health counseling, as reflected in patient instructions       Regular exercise       Healthy diet/nutrition    Estimated body mass index is 23.68 kg/m  as calculated from the following:    Height as of 1/13/21: 1.702 m (5' 7\").    Weight as of this encounter: 68.6 kg (151 lb 3.2 oz).        He reports that he quit smoking about 23 years ago. His smoking use included cigarettes. He has never used smokeless tobacco.      Appropriate preventive services were discussed with this patient, including applicable screening as appropriate for cardiovascular disease, diabetes, osteopenia/osteoporosis, and glaucoma.  As appropriate for age/gender, discussed screening for colorectal cancer, prostate cancer, breast cancer, and cervical cancer. Checklist reviewing preventive services available has been given to the " patient.    Reviewed patients plan of care and provided an AVS. The Basic Care Plan (routine screening as documented in Health Maintenance) for Alvaro meets the Care Plan requirement. This Care Plan has been established and reviewed with the Patient.    Counseling Resources:  ATP IV Guidelines  Pooled Cohorts Equation Calculator  Breast Cancer Risk Calculator  Breast Cancer: Medication to Reduce Risk  FRAX Risk Assessment  ICSI Preventive Guidelines  Dietary Guidelines for Americans, 2010  USDA's MyPlate  ASA Prophylaxis  Lung CA Screening    TAYO PRIDE MD  [facility]    Identified Health Risks:

## 2022-02-07 NOTE — PATIENT INSTRUCTIONS
Continue your current medications without change.    Blood tests today, I will send you a MyChart letter with the results.    Make sure that you get your pacemaker checked regularly.    Make sure that you get your carotid ultrasound done annually    Assuming all goes well, follow-up with me in 1 year.

## 2022-02-07 NOTE — LETTER
February 7, 2022      Alvaro Bass Jr.   [address] [facility] [address]        Dear Alvaro,    Below are the results of your recent labs:    Results for orders placed or performed in visit on 02/07/22   Comprehensive metabolic panel     Status: Abnormal   Result Value Ref Range    Sodium 134 134 - 144 mmol/L    Potassium 4.1 3.5 - 5.1 mmol/L    Chloride 98 98 - 107 mmol/L    Carbon Dioxide (CO2) 27 21 - 31 mmol/L    Anion Gap 9 3 - 14 mmol/L    Urea Nitrogen 31 (H) 7 - 25 mg/dL    Creatinine 1.67 (H) 0.70 - 1.30 mg/dL    Calcium 10.1 8.6 - 10.3 mg/dL    Glucose 108 (H) 70 - 105 mg/dL    Alkaline Phosphatase 45 34 - 104 U/L    AST 16 13 - 39 U/L    ALT 12 7 - 52 U/L    Protein Total 6.8 6.4 - 8.9 g/dL    Albumin 4.8 3.5 - 5.7 g/dL    Bilirubin Total 0.9 0.3 - 1.0 mg/dL    GFR Estimate 40 (L) >60 mL/min/1.73m2   Lipid Panel     Status: None   Result Value Ref Range    Cholesterol 120 <200 mg/dL    Triglycerides 87 <150 mg/dL    Direct Measure HDL 51 23 - 92 mg/dL    LDL Cholesterol Calculated 52 <=100 mg/dL    Non HDL Cholesterol 69 <130 mg/dL    Patient Fasting > 8hrs? Unknown     Narrative    Cholesterol  Desirable:  <200 mg/dL    Triglycerides  Normal:  Less than 150 mg/dL  Borderline High:  150-199 mg/dL  High:  200-499 mg/dL  Very High:  Greater than or equal to 500 mg/dL    Direct Measure HDL  Female:  Greater than or equal to 50 mg/dL   Male:  Greater than or equal to 40 mg/dL    LDL Cholesterol  Desirable:  <100mg/dL  Above Desirable:  100-129 mg/dL   Borderline High:  130-159 mg/dL   High:  160-189 mg/dL   Very High:  >= 190 mg/dL    Non HDL Cholesterol  Desirable:  130 mg/dL  Above Desirable:  130-159 mg/dL  Borderline High:  160-189 mg/dL  High:  190-219 mg/dL  Very High:  Greater than or equal to 220 mg/dL        Your blood tests show that you continue to have moderate weakness of your kidneys.  This is relatively stable compared to previous years.  Everything else looks great.  I am  satisfied with your results.  If you have any questions about any of your results, feel free to contact me.    Sincerely,        Jose Raul Morris MD  Internal Medicine  [facility]

## 2022-02-15 ENCOUNTER — TRANSFERRED RECORDS (OUTPATIENT)
Dept: HEALTH INFORMATION MANAGEMENT | Facility: OTHER | Age: 87
End: 2022-02-15
Payer: MEDICARE

## 2022-03-04 DIAGNOSIS — R39.9 LOWER URINARY TRACT SYMPTOMS (LUTS): ICD-10-CM

## 2022-03-04 DIAGNOSIS — I10 ESSENTIAL HYPERTENSION: ICD-10-CM

## 2022-03-04 DIAGNOSIS — I25.10 ATHEROSCLEROSIS OF NATIVE CORONARY ARTERY OF NATIVE HEART WITHOUT ANGINA PECTORIS: ICD-10-CM

## 2022-03-07 RX ORDER — TAMSULOSIN HYDROCHLORIDE 0.4 MG/1
CAPSULE ORAL
Qty: 90 CAPSULE | Refills: 0 | OUTPATIENT
Start: 2022-03-07

## 2022-03-07 RX ORDER — SIMVASTATIN 80 MG
TABLET ORAL
Qty: 45 TABLET | Refills: 0 | OUTPATIENT
Start: 2022-03-07

## 2022-03-07 RX ORDER — ATENOLOL 50 MG/1
TABLET ORAL
Qty: 90 TABLET | Refills: 0 | OUTPATIENT
Start: 2022-03-07

## 2022-03-07 NOTE — TELEPHONE ENCOUNTER
atenolol (TENORMIN) 50 MG tablet 90 tablet 3 2/7/2022  No   Sig - Route: Take 1 tablet (50 mg) by mouth daily - Oral     tamsulosin (FLOMAX) 0.4 MG capsule 90 capsule 3 2/7/2022  No   Sig - Route: Take 1 capsule (0.4 mg) by mouth daily with food      simvastatin (ZOCOR) 80 MG tablet 45 tablet 3 2/7/2022  No   Sig - Route: Take 0.5 tablets (40 mg) by mouth daily - Oral     To [facility]  Should have refills  Jacqui Saldaña, RN on 3/7/2022 at 9:41 AM

## 2022-04-09 ENCOUNTER — MYC MEDICAL ADVICE (OUTPATIENT)
Dept: INTERNAL MEDICINE | Facility: OTHER | Age: 87
End: 2022-04-09
Payer: MEDICARE

## 2022-04-11 RX ORDER — HYDROCHLOROTHIAZIDE 25 MG/1
TABLET ORAL
Qty: 90 TABLET | Refills: 0 | OUTPATIENT
Start: 2022-04-11

## 2022-04-11 NOTE — TELEPHONE ENCOUNTER
Filled 02/07/2022 #90 x 3. Due 02/07/2023. Pharmacy alerted Unable to complete prescription refill per RNMedication Refill Policy.................... Barbi Mitchell RN ....................  4/11/2022   12:44 PM       Disp Refills Start End HARVINDER   hydrochlorothiazide (HYDRODIURIL) 25 MG tablet 90 tablet 3 2/7/2022  No   Sig - Route: Take 1 tablet (25 mg) by mouth daily - Oral   Sent to pharmacy as: hydroCHLOROthiazide 25 MG Oral Tablet (HYDRODIURIL)   Class: E-Prescribe   Order: [id number]   E-Prescribing Status: Receipt confirmed by pharmacy (2/7/2022  8:37 AM CST)         [facility] - [address]

## 2022-07-16 ENCOUNTER — MYC MEDICAL ADVICE (OUTPATIENT)
Dept: INTERNAL MEDICINE | Facility: OTHER | Age: 87
End: 2022-07-16

## 2022-07-18 NOTE — TELEPHONE ENCOUNTER
If he is having problems including with his blood pressure, he needs to schedule an appointment to be seen.

## 2022-10-03 ENCOUNTER — MYC MEDICAL ADVICE (OUTPATIENT)
Dept: INTERNAL MEDICINE | Facility: OTHER | Age: 87
End: 2022-10-03

## 2023-02-08 ASSESSMENT — ENCOUNTER SYMPTOMS
COUGH: 0
CHILLS: 0
PALPITATIONS: 0
JOINT SWELLING: 0
PARESTHESIAS: 0
DYSURIA: 0
HEMATURIA: 0
FEVER: 0
ABDOMINAL PAIN: 0
DIZZINESS: 0
HEARTBURN: 0
MYALGIAS: 0
SHORTNESS OF BREATH: 1
EYE PAIN: 0
NAUSEA: 0
DIARRHEA: 0
CONSTIPATION: 0
HEMATOCHEZIA: 0
NERVOUS/ANXIOUS: 0
FREQUENCY: 0
HEADACHES: 0
SORE THROAT: 0
WEAKNESS: 0
ARTHRALGIAS: 0

## 2023-02-08 ASSESSMENT — ACTIVITIES OF DAILY LIVING (ADL): CURRENT_FUNCTION: NO ASSISTANCE NEEDED

## 2023-02-13 ENCOUNTER — MYC MEDICAL ADVICE (OUTPATIENT)
Dept: INTERNAL MEDICINE | Facility: OTHER | Age: 88
End: 2023-02-13
Payer: MEDICARE

## 2023-02-13 RX ORDER — AMLODIPINE BESYLATE 2.5 MG/1
TABLET ORAL
Qty: 90 TABLET | Refills: 0 | OUTPATIENT
Start: 2023-02-13

## 2023-02-13 NOTE — TELEPHONE ENCOUNTER
[facility] sent Rx request for the following:    amLODIPine Besylate 2.5 MG Oral Tablet  Last Prescription Date:   2/7/22  Last Fill Qty/Refills:         90, R-3    Last Office Visit:              2/7/22   Future Office visit:           2/15/23  Patient will have these filled in 2 days at office visit.  Olena Hinton RN on 2/13/2023 at 2:47 PM

## 2023-02-15 ENCOUNTER — OFFICE VISIT (OUTPATIENT)
Dept: INTERNAL MEDICINE | Facility: OTHER | Age: 88
End: 2023-02-15
Attending: INTERNAL MEDICINE
Payer: MEDICARE

## 2023-02-15 VITALS
SYSTOLIC BLOOD PRESSURE: 150 MMHG | HEART RATE: 80 BPM | DIASTOLIC BLOOD PRESSURE: 80 MMHG | TEMPERATURE: 96.6 F | OXYGEN SATURATION: 95 % | BODY MASS INDEX: 24.01 KG/M2 | RESPIRATION RATE: 16 BRPM | WEIGHT: 153 LBS | HEIGHT: 67 IN

## 2023-02-15 DIAGNOSIS — N18.32 STAGE 3B CHRONIC KIDNEY DISEASE (H): ICD-10-CM

## 2023-02-15 DIAGNOSIS — I65.23 BILATERAL CAROTID ARTERY STENOSIS: ICD-10-CM

## 2023-02-15 DIAGNOSIS — R39.9 LOWER URINARY TRACT SYMPTOMS (LUTS): ICD-10-CM

## 2023-02-15 DIAGNOSIS — I10 ESSENTIAL HYPERTENSION: Primary | ICD-10-CM

## 2023-02-15 DIAGNOSIS — I25.10 ATHEROSCLEROSIS OF NATIVE CORONARY ARTERY OF NATIVE HEART WITHOUT ANGINA PECTORIS: ICD-10-CM

## 2023-02-15 DIAGNOSIS — I10 PRIMARY HYPERTENSION: ICD-10-CM

## 2023-02-15 DIAGNOSIS — E78.5 HYPERLIPIDEMIA, UNSPECIFIED HYPERLIPIDEMIA TYPE: ICD-10-CM

## 2023-02-15 LAB
ALBUMIN SERPL BCG-MCNC: 4.3 G/DL (ref 3.5–5.2)
ALP SERPL-CCNC: 60 U/L (ref 40–129)
ALT SERPL W P-5'-P-CCNC: 14 U/L (ref 10–50)
ANION GAP SERPL CALCULATED.3IONS-SCNC: 11 MMOL/L (ref 7–15)
AST SERPL W P-5'-P-CCNC: 21 U/L (ref 10–50)
BILIRUB SERPL-MCNC: 0.7 MG/DL
BUN SERPL-MCNC: 25.4 MG/DL (ref 8–23)
CALCIUM SERPL-MCNC: 9.7 MG/DL (ref 8.8–10.2)
CHLORIDE SERPL-SCNC: 99 MMOL/L (ref 98–107)
CHOLEST SERPL-MCNC: 108 MG/DL
CREAT SERPL-MCNC: 1.49 MG/DL (ref 0.67–1.17)
DEPRECATED HCO3 PLAS-SCNC: 23 MMOL/L (ref 22–29)
GFR SERPL CREATININE-BSD FRML MDRD: 45 ML/MIN/1.73M2
GLUCOSE SERPL-MCNC: 119 MG/DL (ref 70–99)
HDLC SERPL-MCNC: 57 MG/DL
LDLC SERPL CALC-MCNC: 40 MG/DL
NONHDLC SERPL-MCNC: 51 MG/DL
POTASSIUM SERPL-SCNC: 4.3 MMOL/L (ref 3.4–5.3)
PROT SERPL-MCNC: 7 G/DL (ref 6.4–8.3)
SODIUM SERPL-SCNC: 133 MMOL/L (ref 136–145)
TRIGL SERPL-MCNC: 56 MG/DL

## 2023-02-15 PROCEDURE — 80061 LIPID PANEL: CPT | Mod: ZL | Performed by: INTERNAL MEDICINE

## 2023-02-15 PROCEDURE — G0463 HOSPITAL OUTPT CLINIC VISIT: HCPCS

## 2023-02-15 PROCEDURE — 99213 OFFICE O/P EST LOW 20 MIN: CPT | Mod: 25 | Performed by: INTERNAL MEDICINE

## 2023-02-15 PROCEDURE — G0439 PPPS, SUBSEQ VISIT: HCPCS | Performed by: INTERNAL MEDICINE

## 2023-02-15 PROCEDURE — 36415 COLL VENOUS BLD VENIPUNCTURE: CPT | Mod: ZL | Performed by: INTERNAL MEDICINE

## 2023-02-15 PROCEDURE — 80053 COMPREHEN METABOLIC PANEL: CPT | Mod: ZL | Performed by: INTERNAL MEDICINE

## 2023-02-15 RX ORDER — HYDROCHLOROTHIAZIDE 25 MG/1
25 TABLET ORAL DAILY
Qty: 90 TABLET | Refills: 3 | Status: SHIPPED | OUTPATIENT
Start: 2023-02-15

## 2023-02-15 RX ORDER — TAMSULOSIN HYDROCHLORIDE 0.4 MG/1
0.4 CAPSULE ORAL
Qty: 90 CAPSULE | Refills: 3 | Status: SHIPPED | OUTPATIENT
Start: 2023-02-15

## 2023-02-15 RX ORDER — ATENOLOL 50 MG/1
50 TABLET ORAL DAILY
Qty: 90 TABLET | Refills: 3 | Status: SHIPPED | OUTPATIENT
Start: 2023-02-15

## 2023-02-15 RX ORDER — SIMVASTATIN 80 MG
40 TABLET ORAL DAILY
Qty: 45 TABLET | Refills: 3 | Status: SHIPPED | OUTPATIENT
Start: 2023-02-15

## 2023-02-15 RX ORDER — LOSARTAN POTASSIUM 100 MG/1
100 TABLET ORAL DAILY
Qty: 90 TABLET | Refills: 3 | Status: SHIPPED | OUTPATIENT
Start: 2023-02-15

## 2023-02-15 RX ORDER — AMLODIPINE BESYLATE 2.5 MG/1
2.5 TABLET ORAL DAILY
Qty: 90 TABLET | Refills: 3 | Status: SHIPPED | OUTPATIENT
Start: 2023-02-15

## 2023-02-15 ASSESSMENT — ACTIVITIES OF DAILY LIVING (ADL): CURRENT_FUNCTION: NO ASSISTANCE NEEDED

## 2023-02-15 ASSESSMENT — PAIN SCALES - GENERAL: PAINLEVEL: NO PAIN (0)

## 2023-02-15 NOTE — LETTER
February 15, 2023      Alvaro Bass Jr.   [address]  [address] [facility] [address]        Dear Alvaro,    Below are the results of your recent labs:    Results for orders placed or performed in visit on 02/15/23   Lipid Panel     Status: Normal   Result Value Ref Range    Cholesterol 108 <200 mg/dL    Triglycerides 56 <150 mg/dL    Direct Measure HDL 57 >=40 mg/dL    LDL Cholesterol Calculated 40 <=100 mg/dL    Non HDL Cholesterol 51 <130 mg/dL    Narrative    Cholesterol  Desirable:  <200 mg/dL    Triglycerides  Normal:  Less than 150 mg/dL  Borderline High:  150-199 mg/dL  High:  200-499 mg/dL  Very High:  Greater than or equal to 500 mg/dL    Direct Measure HDL  Female:  Greater than or equal to 50 mg/dL   Male:  Greater than or equal to 40 mg/dL    LDL Cholesterol  Desirable:  <100mg/dL  Above Desirable:  100-129 mg/dL   Borderline High:  130-159 mg/dL   High:  160-189 mg/dL   Very High:  >= 190 mg/dL    Non HDL Cholesterol  Desirable:  130 mg/dL  Above Desirable:  130-159 mg/dL  Borderline High:  160-189 mg/dL  High:  190-219 mg/dL  Very High:  Greater than or equal to 220 mg/dL   Comprehensive metabolic panel     Status: Abnormal   Result Value Ref Range    Sodium 133 (L) 136 - 145 mmol/L    Potassium 4.3 3.4 - 5.3 mmol/L    Chloride 99 98 - 107 mmol/L    Carbon Dioxide (CO2) 23 22 - 29 mmol/L    Anion Gap 11 7 - 15 mmol/L    Urea Nitrogen 25.4 (H) 8.0 - 23.0 mg/dL    Creatinine 1.49 (H) 0.67 - 1.17 mg/dL    Calcium 9.7 8.8 - 10.2 mg/dL    Glucose 119 (H) 70 - 99 mg/dL    Alkaline Phosphatase 60 40 - 129 U/L    AST 21 10 - 50 U/L    ALT 14 10 - 50 U/L    Protein Total 7.0 6.4 - 8.3 g/dL    Albumin 4.3 3.5 - 5.2 g/dL    Bilirubin Total 0.7 <=1.2 mg/dL    GFR Estimate 45 (L) >60 mL/min/1.73m2        Your blood tests look fine.  Your kidneys are a little bit weak but this remains stable.  Your sugar is slightly high but I do not think this is concerning.  I am satisfied with your  results.    Sincerely,        Jose Raul Morris MD  Internal Medicine  [facility]

## 2023-02-15 NOTE — PATIENT INSTRUCTIONS
Continue your current medications.    Blood tests today, I will send you a letter with the results.    You need to get scheduled to have your carotid arteries checked again.    Your blood pressure is somewhat high today.  Check it at home and if it remains high let me know and we will make an adjustment in your medications.    If all goes well, follow-up annually.

## 2023-02-15 NOTE — PROGRESS NOTES
"SUBJECTIVE:   Alvaro is a 87 year old who presents for Preventive Visit.    This patient is here today for an annual Medicare wellness visit.  He has a history of atherosclerotic coronary artery disease.  He is entirely asymptomatic.  He also has a history of bilateral carotid artery stenosis.  He is getting annual carotid ultrasounds through [facility].  He has not had it done yet this year and I encouraged him to get it done.  He has treated hyperlipidemia and treated hypertension.  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    He has a history of a bladder outlet obstruction as well as lower urinary tract symptoms.  He wonders today why he no longer needs to have screening for prostate cancer, colon cancer, etc.  I spent some time reviewing that with him today.    He has no other complaints or concerns.  Medications are reconciled.  Past medical history, past surgical history, family history and social histories are reviewed and updated.  Immunizations are up-to-date.      Patient has been advised of split billing requirements and indicates understanding: Yes  Are you in the first 12 months of your Medicare coverage?  No    Healthy Habits:     In general, how would you rate your overall health?  Good    Frequency of exercise:  None    Do you usually eat at least 4 servings of fruit and vegetables a day, include whole grains    & fiber and avoid regularly eating high fat or \"junk\" foods?  No    Taking medications regularly:  Yes    Medication side effects:  None    Ability to successfully perform activities of daily living:  No assistance needed    Home Safety:  No safety concerns identified    Hearing Impairment:  Difficulty following a conversation in a noisy restaurant or crowded room, find that men's voices are easier to understand than woman's and difficulty understanding soft or whispered speech    In the past 6 months, have you been bothered by leaking of urine?  " No    In general, how would you rate your overall mental or emotional health?  Excellent      PHQ-2 Total Score: 0    Additional concerns today:  No      Have you ever done Advance Care Planning? (For example, a Health Directive, POLST, or a discussion with a medical provider or your loved ones about your wishes): Yes, advance care planning is on file.       Fall risk  Fallen 2 or more times in the past year?: No  Any fall with injury in the past year?: No    Cognitive Screening   1) Repeat 3 items (Leader, Season, Table)    2) Clock draw: NORMAL  3) 3 item recall: Recalls 3 objects  Results: 3 items recalled: COGNITIVE IMPAIRMENT LESS LIKELY    Mini-CogTM Copyright [employer] [place]. Licensed by the author for use in [facility]; reprinted with permission (frida@[facility]). All rights reserved.      Do you have sleep apnea, excessive snoring or daytime drowsiness?: no    Reviewed and updated as needed this visit by clinical staff   Tobacco  Allergies  Meds  Problems  Med Hx  Surg Hx  Fam Hx          Reviewed and updated as needed this visit by Provider   Tobacco  Allergies  Meds  Problems  Med Hx  Surg Hx  Fam Hx         Social History     Tobacco Use     Smoking status: Former     Types: Cigarettes     Quit date: 1999     Years since quittin.1     Smokeless tobacco: Never   Substance Use Topics     Alcohol use: No     Alcohol/week: 0.0 standard drinks         Alcohol Use 2023   Prescreen: >3 drinks/day or >7 drinks/week? Not Applicable     Review current opioid prescriptions    For a patient with a current opioid prescription:    Reviewed potential Opioid Use Disorder (OUD) risk factors: Yes     Evaluate their pain severity and current treatment plan:     Provide information on non-opioid treatment options:    Refer to a specialist, as appropriate:    Get more information on pain management in the HHS Pain Management Best Practices Inter-agency Task Force Report    Screen for potential  Substance Use Disorders (SUDs)    Reviewed the patient s potential risk factors for SUDs: Yes     Refer to treatment or specialist, as appropriate:     A screening tool isn t required but you may use one:  Find more information in the National [place] on Drug Abuse Screening and Assessment Tools Chart        Current Outpatient Medications   Medication     amLODIPine (NORVASC) 2.5 MG tablet     aspirin (GOODSENSE ASPIRIN) 325 MG tablet     atenolol (TENORMIN) 50 MG tablet     fluocinonide (LIDEX) 0.05 % external ointment     hydrochlorothiazide (HYDRODIURIL) 25 MG tablet     losartan (COZAAR) 100 MG tablet     mometasone (ELOCON) 0.1 % external cream     psyllium (TGT PSYLLIUM FIBER) 0.52 G capsule     simvastatin (ZOCOR) 80 MG tablet     tamsulosin (FLOMAX) 0.4 MG capsule     triamcinolone (KENALOG) 0.1 % external ointment     No current facility-administered medications for this visit.         Current providers sharing in care for this patient include:   Patient Care Team:  Jose Raul Morris MD as PCP - General (Internal Medicine)  Jose Raul Morris MD as Assigned PCP    The following health maintenance items are reviewed in Epic and correct as of today:  Health Maintenance   Topic Date Due     BMP  02/07/2023     LIPID  02/07/2023     MEDICARE ANNUAL WELLNESS VISIT  02/15/2024     FALL RISK ASSESSMENT  02/15/2024     DTAP/TDAP/TD IMMUNIZATION (4 - Td or Tdap) 11/10/2026     ADVANCE CARE PLANNING  02/15/2028     PHQ-2 (once per calendar year)  Completed     INFLUENZA VACCINE  Completed     Pneumococcal Vaccine: 65+ Years  Completed     ZOSTER IMMUNIZATION  Completed     COVID-19 Vaccine  Completed     IPV IMMUNIZATION  Aged Out     MENINGITIS IMMUNIZATION  Aged Out     MICROALBUMIN  Discontinued     HEMOGLOBIN  Discontinued     URINALYSIS  Discontinued     Lab work is in process          Review of Systems  Constitutional, HEENT, cardiovascular, pulmonary, GI, , musculoskeletal, neuro, skin, endocrine and psych  "systems are negative, except as otherwise noted.    OBJECTIVE:   BP (!) 162/90   Pulse 80   Temp (!) 96.6  F (35.9  C) (Tympanic)   Resp 16   Ht 1.689 m (5' 6.5\")   Wt 69.4 kg (153 lb)   SpO2 95%   BMI 24.32 kg/m   Estimated body mass index is 24.32 kg/m  as calculated from the following:    Height as of this encounter: 1.689 m (5' 6.5\").    Weight as of this encounter: 69.4 kg (153 lb).  Physical Exam  GENERAL: healthy, alert and no distress  EYES: Eyes grossly normal to inspection, PERRL and conjunctivae and sclerae normal  HENT: ear canals and TM's normal, nose and mouth without ulcers or lesions  NECK: no adenopathy, no asymmetry, masses, or scars and thyroid normal to palpation  RESP: lungs clear to auscultation - no rales, rhonchi or wheezes  CV: regular rate and rhythm, normal S1 S2, no S3 or S4, no murmur, click or rub, no peripheral edema and peripheral pulses strong.  Bilateral carotid bruits  ABDOMEN: soft, nontender, no hepatosplenomegaly, no masses and bowel sounds normal  : Normal male, no hernia.  Prostate 3+ without nodularity  MS: no gross musculoskeletal defects noted, no edema  SKIN: no suspicious lesions or rashes  NEURO: Normal strength and tone, mentation intact and speech normal  PSYCH: mentation appears normal, affect normal/bright        ASSESSMENT / PLAN:       ICD-10-CM    1. Essential hypertension  I10 amLODIPine (NORVASC) 2.5 MG tablet     atenolol (TENORMIN) 50 MG tablet     hydrochlorothiazide (HYDRODIURIL) 25 MG tablet     losartan (COZAAR) 100 MG tablet      2. Atherosclerosis of native coronary artery of native heart without angina pectoris  I25.10 simvastatin (ZOCOR) 80 MG tablet     Comprehensive metabolic panel     Lipid Panel      3. Lower urinary tract symptoms (LUTS)  R39.9 tamsulosin (FLOMAX) 0.4 MG capsule      4. Primary hypertension  I10       5. Hyperlipidemia, unspecified hyperlipidemia type  E78.5       6. Stage 3b chronic kidney disease (H)  N18.32       7. " Bilateral carotid artery stenosis  I65.23           Patient has been advised of split billing requirements and indicates understanding: Yes      COUNSELING:    Overall he appears to be stable.  Medications will continue without change.  His blood pressure is a little bit high today so he will check it at home and if it remains high he will let me know.  I would likely increase his amlodipine at that time.  Medications are otherwise filled for him.  Complete lab drawn and pending, I will send him a letter with the results and any recommendations.  I did ask him to contact [facility] and get scheduled for a follow-up carotid ultrasound since it has been a year.  Assuming all goes well, he will follow-up annually.  Reviewed preventive health counseling, as reflected in patient instructions       Regular exercise       Healthy diet/nutrition        He reports that he quit smoking about 24 years ago. His smoking use included cigarettes. He has never used smokeless tobacco.      Appropriate preventive services were discussed with this patient, including applicable screening as appropriate for cardiovascular disease, diabetes, osteopenia/osteoporosis, and glaucoma.  As appropriate for age/gender, discussed screening for colorectal cancer, prostate cancer, breast cancer, and cervical cancer. Checklist reviewing preventive services available has been given to the patient.    Reviewed patients plan of care and provided an AVS. The Basic Care Plan (routine screening as documented in Health Maintenance) for Alvaro meets the Care Plan requirement. This Care Plan has been established and reviewed with the Patient.      TAYO PRIDE MD  [facility]    Identified Health Risks:

## 2023-03-06 ENCOUNTER — MYC MEDICAL ADVICE (OUTPATIENT)
Dept: INTERNAL MEDICINE | Facility: OTHER | Age: 88
End: 2023-03-06
Payer: MEDICARE

## 2023-03-08 ENCOUNTER — OFFICE VISIT (OUTPATIENT)
Dept: FAMILY MEDICINE | Facility: OTHER | Age: 88
End: 2023-03-08
Payer: MEDICARE

## 2023-03-08 ENCOUNTER — HOSPITAL ENCOUNTER (OUTPATIENT)
Dept: GENERAL RADIOLOGY | Facility: OTHER | Age: 88
Discharge: HOME | End: 2023-03-08
Payer: MEDICARE

## 2023-03-08 VITALS
HEART RATE: 62 BPM | DIASTOLIC BLOOD PRESSURE: 72 MMHG | RESPIRATION RATE: 15 BRPM | OXYGEN SATURATION: 98 % | SYSTOLIC BLOOD PRESSURE: 138 MMHG | TEMPERATURE: 97.2 F | WEIGHT: 150.6 LBS | BODY MASS INDEX: 23.94 KG/M2

## 2023-03-08 DIAGNOSIS — R53.1 WEAKNESS: ICD-10-CM

## 2023-03-08 DIAGNOSIS — R09.81 NASAL CONGESTION: ICD-10-CM

## 2023-03-08 DIAGNOSIS — J18.9 PNEUMONIA OF LEFT LOWER LOBE DUE TO INFECTIOUS ORGANISM: Primary | ICD-10-CM

## 2023-03-08 DIAGNOSIS — R05.8 PRODUCTIVE COUGH: ICD-10-CM

## 2023-03-08 LAB
FLUAV RNA SPEC QL NAA+PROBE: NEGATIVE
FLUBV RNA RESP QL NAA+PROBE: NEGATIVE
RSV RNA SPEC NAA+PROBE: NEGATIVE
SARS-COV-2 RNA RESP QL NAA+PROBE: NEGATIVE

## 2023-03-08 PROCEDURE — 71046 X-RAY EXAM CHEST 2 VIEWS: CPT

## 2023-03-08 PROCEDURE — G0463 HOSPITAL OUTPT CLINIC VISIT: HCPCS | Mod: 25

## 2023-03-08 PROCEDURE — 99204 OFFICE O/P NEW MOD 45 MIN: CPT | Mod: CS

## 2023-03-08 PROCEDURE — 87637 SARSCOV2&INF A&B&RSV AMP PRB: CPT | Mod: ZL

## 2023-03-08 PROCEDURE — C9803 HOPD COVID-19 SPEC COLLECT: HCPCS

## 2023-03-08 PROCEDURE — G0463 HOSPITAL OUTPT CLINIC VISIT: HCPCS

## 2023-03-08 RX ORDER — DOXYCYCLINE 100 MG/1
100 CAPSULE ORAL 2 TIMES DAILY
Qty: 14 CAPSULE | Refills: 0 | Status: SHIPPED | OUTPATIENT
Start: 2023-03-08 | End: 2023-03-15

## 2023-03-08 RX ORDER — LOSARTAN POTASSIUM 100 MG/1
100 TABLET ORAL
COMMUNITY
End: 2023-03-20

## 2023-03-08 ASSESSMENT — PAIN SCALES - GENERAL: PAINLEVEL: NO PAIN (0)

## 2023-03-08 NOTE — PROGRESS NOTES
ASSESSMENT/PLAN:    I have reviewed the nursing notes.  I have reviewed the findings, diagnosis, plan and need for follow up with the patient.    1. Pneumonia of left lower lobe due to infectious organism  2. Nasal congestion  3. Productive cough  4. Weakness  - amoxicillin-clavulanate (AUGMENTIN) 875-125 MG tablet; Take 1 tablet by mouth 2 times daily for 5 days  Dispense: 10 tablet; Refill: 0  - doxycycline hyclate (VIBRAMYCIN) 100 MG capsule; Take 1 capsule (100 mg) by mouth 2 times daily for 7 days  Dispense: 14 capsule; Refill: 0  - XR Chest 2 Views  - Symptomatic Influenza A/B, RSV, & SARS-CoV2 PCR (COVID-19) Nose    Patient presents with upper respiratory symptoms and a persistent cough.  Patient's vital signs are stable and he appears nontoxic.  Patient is in no respiratory distress.  Chest x-ray shows signs of subtle diffuse left lower lobe infiltrate/pneumonia.  Patient's multiplex test was negative. Discussed results with patient in clinic.  Will treat pneumonia with Augmentin and doxycycline.  Advised patient to take the Augmentin with food and to avoid eating or drinking any dairy products within an hour or 2 of taking the doxycycline.  Advised patient that the antibiotics will also provide coverage for his sinus symptoms as well. Discussed symptomatic treatment - Encouraged fluids, honey, elevation, humidifier, sinus rinse/netti pot, lozenges, tea, topical vapor rub, popsicles, rest, etc  May use over-the-counter Tylenol or ibuprofen PRN.     Discussed warning signs/symptoms indicative of need to f/u    Follow up if symptoms persist or worsen or concerns    I explained my diagnostic considerations and recommendations to the patient, who voiced understanding and agreement with the treatment plan. All questions were answered. We discussed potential side effects of any prescribed or recommended therapies, as well as expectations for response to treatments.    Norberto Fragoso, SHYAM CNP  3/8/2023  2:51  PM    HPI:    Alvaro Bass Jr. is a 87 year old male  who presents to [facility] today for concerns of URI symptoms    URI, x 10 days    Symptoms:  No fevers or chills.   No sore throat/pharyngitis/tonsillitis.   YES: + allergy/URI Symptoms  No muffled sounds/change in hearing  No sensation of fullness in ear(s)  No ringing in ears/tinnitus  No balance changes  No dizziness  YES: + congestion (head/nasal/chest)  YES: + cough/productive cough  YES: + post nasal drip - color: yellow/orange - green  No headache  Yes: + sinus pain/pressure  No myalgias  No otalgia  No rash  Activity Level Changes: Yes: increased fatigue  Appetite/Liquid Intake Changes: Yes: decreased  Changes to Bowel Habits: No  Changes to Bladder Habits: No  Additional Symptoms to Report: No  History of similar symptoms: No  Prior workup: No    Treatments tried: Decongestants, OTC Cough med, Fluids and Rest    Site of exposure: not known.  Type of exposure: not known    Vaccination status:   - Influenza: 10/3/22  - COVID: 5 doses    Cardiopulmonary History:  Recent Infections (Pneumonia, etc): No  Smoker: No  Asthma: No  COPD: No  Cystic Fibrosis, Dystrophy (Myotonic, etc.), etc.: No  Cardiac History Including Stroke/Stent Placement/STEMI/STEMI, etc.: Hx of MI in 1999, has pacemaker    Other Pertinent History: none    Allergies: NKA    PCP: Dr. Morris    Past Medical History:   Diagnosis Date     Atherosclerotic heart disease of native coronary artery without angina pectoris     No Comments Provided     Atrioventricular block, complete (H)     No Comments Provided     Bladder neck obstruction     No Comments Provided     Chronic kidney disease, stage III (moderate) (H)     8/21/2014     Diverticulosis of large intestine without perforation or abscess without bleeding     3/9/2015     Essential (primary) hypertension     No Comments Provided     History of colonic polyps     2000,not adenomatous     Hyperlipidemia     No Comments Provided     Non-ST  elevation (NSTEMI) myocardial infarction (H)     1999,with favorable post myocardial infarction thallium studies.     Occlusion and stenosis of unspecified carotid artery     2012,Moderate <60% bilateral on US     Past Surgical History:   Procedure Laterality Date     APPENDECTOMY OPEN       COLONOSCOPY      2010,Numerous polyps-all hyperplastic.  Next due .     COLONOSCOPY      3/9/15,F/U N/A age     CYSTOSCOPY      No Comments Provided     EXTRACAPSULAR CATARACT EXTRATION WITH INTRAOCULAR LENS IMPLANT Bilateral      HEART CATH, ANGIOPLASTY      ,right coronary artery     HERNIA REPAIR Right 2016    LAPAROSCOPIC INCARCERATED INGUINAL HERNIA REPAIR     IMPLANT PACEMAKER      2005,Dual chamber pacemaker, [employer], model:  E2ER01     REPLACE PACEMAKER GENERATOR      2013     Social History     Tobacco Use     Smoking status: Former     Types: Cigarettes     Quit date: 1999     Years since quittin.1     Smokeless tobacco: Never   Substance Use Topics     Alcohol use: No     Alcohol/week: 0.0 standard drinks     Current Outpatient Medications   Medication Sig Dispense Refill     amLODIPine (NORVASC) 2.5 MG tablet Take 1 tablet (2.5 mg) by mouth daily 90 tablet 3     aspirin (ASA) 325 MG tablet Take 325 mg by mouth daily with food       atenolol (TENORMIN) 50 MG tablet Take 1 tablet (50 mg) by mouth daily 90 tablet 3     fluocinonide (LIDEX) 0.05 % external ointment Apply topically 2 times daily       hydrochlorothiazide (HYDRODIURIL) 25 MG tablet Take 1 tablet (25 mg) by mouth daily 90 tablet 3     losartan (COZAAR) 100 MG tablet Take 100 mg by mouth       losartan (COZAAR) 100 MG tablet Take 1 tablet (100 mg) by mouth daily 90 tablet 3     mometasone (ELOCON) 0.1 % external cream Apply to ears with clean hands twice a day for 14 days then as needed 45 g 2     psyllium (METAMUCIL/KONSYL) 0.52 g capsule Take 1 capsule by mouth At Bedtime       simvastatin (ZOCOR) 80 MG tablet Take  0.5 tablets (40 mg) by mouth daily 45 tablet 3     tamsulosin (FLOMAX) 0.4 MG capsule Take 1 capsule (0.4 mg) by mouth daily with food 90 capsule 3     triamcinolone (KENALOG) 0.1 % external ointment Apply topically 3 times daily 80 g 2     No Known Allergies  Past medical history, past surgical history, current medications and allergies reviewed and accurate to the best of my knowledge.      ROS:  Refer to HPI    /72   Pulse 62   Temp 97.2  F (36.2  C) (Tympanic)   Resp 15   Wt 68.3 kg (150 lb 9.6 oz)   SpO2 98%   BMI 23.94 kg/m      EXAM:  General Appearance: Well appearing 87 year old male, appropriate appearance for age. No acute distress   Ears: Left TM intact, translucent with bony landmarks appreciated, no erythema, no effusion, no bulging, no purulence.  Right TM intact, translucent with bony landmarks appreciated, no erythema, no effusion, no bulging, no purulence.  Left auditory canal clear.  Right auditory canal clear.  Normal external ears, non tender.  Eyes: conjunctivae normal without erythema or irritation, corneas clear, no drainage or crusting, no eyelid swelling, pupils equal   Oropharynx: moist mucous membranes, posterior pharynx without erythema, tonsils symmetric and 1+, no erythema, no exudates or petechiae, no post nasal drip seen, no trismus, voice clear.    Sinuses:  Mild sinus tenderness upon palpation of the left maxillary sinus  Nose:  Bilateral nares: mild erythema and edema, purulent drainage and congestion   Neck: supple without adenopathy  Respiratory: normal chest wall and respirations.  Normal effort.  Mild expiratory wheezes in left upper lobe.  No increased work of breathing.  Mild cough appreciated.  Cardiac: RRR with no murmurs  Musculoskeletal:  Equal movement of bilateral upper extremities.  Equal movement of bilateral lower extremities.  Normal gait.    Dermatological: no rashes noted of exposed skin  Neuro: Alert and oriented to person, place, and time.     Psychological: normal affect, alert, oriented, and pleasant.     Labs & Xray:  Results for orders placed or performed in visit on 03/08/23   XR Chest 2 Views     Status: None    Narrative    PROCEDURE:  XR CHEST 2 VIEWS    HISTORY: Productive cough, .    COMPARISON:  None.    FINDINGS:  The cardiomediastinal contours are borderline enlarged. The trachea is  midline.  The lungs are complex in appearance. A bulla is suggested at the right  lung base. There is patchy ill-defined opacity in the left lower lobe.  No pneumothorax is seen.    No suspicious osseous lesion or subdiaphragmatic free air.      Impression    IMPRESSION:      Complex appearance of the lungs with probable subtle diffuse left  lower lobe infiltrate/pneumonia.     Right base probable bulla.    Correlation with prior chest imaging would be helpful. Consider  follow-up CT chest in 8 weeks after appropriate clinical therapy.    AMBER AMBROSIO MD         SYSTEM ID:  [id number]   Symptomatic Influenza A/B, RSV, & SARS-CoV2 PCR (COVID-19) Nose     Status: Normal    Specimen: Nose; Swab   Result Value Ref Range    Influenza A PCR Negative Negative    Influenza B PCR Negative Negative    RSV PCR Negative Negative    SARS CoV2 PCR Negative Negative    Narrative    Testing was performed using the Xpert Xpress CoV2/Flu/RSV Assay on the [employer] GeneXpert Instrument. This test should be ordered for the detection of SARS-CoV-2, influenza, and RSV viruses in individuals who meet clinical and/or epidemiological criteria. Test performance is unknown in asymptomatic patients. This test is for in vitro diagnostic use under the FDA EUA for laboratories certified under CLIA to perform high or moderate complexity testing. This test has not been FDA cleared or approved. A negative result does not rule out the presence of PCR inhibitors in the specimen or target RNA in concentration below the limit of detection for the assay. If only one viral target is positive but  coinfection with multiple targets is suspected, the sample should be re-tested with another FDA cleared, approved, or authorized test, if coinfection would change clinical management. This test was validated by the [facility]. These laboratories are certified under the Clinical Laboratory Improvement Amendments of 1988 (CLIA-88) as qualified to perform high complexity laboratory testing.

## 2023-03-08 NOTE — PATIENT INSTRUCTIONS
If a COVID swab was performed:   Please quarantine until results return which takes 24-72 hours, unless informed otherwise.     If COVID testing is negative, symptoms are improving (no need for antipyretics/fever reducers, etc.), that patient can return to normal activities of daily living.    If you test positive for COVID (regardless of vaccination status): stay home for 5 days. If you have no symptoms or symptoms are resolving after 5 days, you may leave the house per CDC guidelines. Continue to wear a mask around others for 5 days. You should also be fever free for 24 hours without the use of fever reducers (Tylenol/Ibuprofen).     If Influenza positive, 5-day quarantine from symptom onset and fever free for 24 hours (in addition to this). Follow school/employer guidelines     If RSV positive, follow school/employer guideline + fever free for 24 hours.     Please refer to your AVS for follow up and pain/symptoms management recommendations (I.e.: medications, helpful conservative treatment modalities, appropriate follow up if need to a specialist or family practice, etc.). Please return to urgent care if your symptoms change or worsen.     Discharge instructions:  -If you were prescribed a medication(s), please take this as prescribed/directed  -Monitor your symptoms, if changing/worsening, return to UC/ER or PCP for follow up    Symptomatic treatments recommended.  - Antibiotics will not help with your symptoms, unless you were told otherwise today (strep throat, ear infection, etc. ). Education provided on symptoms of secondary bacterial infection such as new fever, chills, rigors, shortness of breath, increased work of breathing, that can occur with viral URI and need for further evaluation, if they occur.   - Ensure you are staying hydrated by drinking plenty of fluids and eating mild foods and advance diet as tolerated  - Honey can be soothing for sore throat (as long as above 12 months of age)  - Warm salt  water gurgles can help soothe sore throat  - Humidifier can help with congestion and help keep mucus membranes such as throat and nose from drying out.  - Sleeping slightly propped up can help with congestion and postnasal drainage that can worsen cough at bedtime.  - As long as you have never been told to take Tylenol and/or Ibuprofen you can use them to manage fever and body aches per package instructions  Make sure you eat when you take ibuprofen to avoid stomach upset.  - OTC cough medications per package instructions to help with cough. Check to see if the cough/cold medication already has acetaminophen (Tylenol) in it. If it does avoid taking additional Tylenol.  - If sudden onset of new fever, worsening symptoms return for further evaluation.  - OTC antihistamine such as Allegra, Zyrtec, Claritin (generic is okay) can help with nasal/sinus congestion and OTC nasal steroid such as Flonase can help decrease sinus inflammation to help with congestion.  - Education provided on symptoms of post-viral bacterial infections including ear infection and pneumonia. This would require re-evaluation for treatment.

## 2023-03-08 NOTE — NURSING NOTE
Chief Complaint   Patient presents with     Cough     Intermittent     Patient presents today for persistent/intermittent cough. Patient stated he had a cold on Feb 26th and has had the cough ever since.     Medication Reconciliation: karri Morin

## 2023-03-20 ENCOUNTER — OFFICE VISIT (OUTPATIENT)
Dept: INTERNAL MEDICINE | Facility: OTHER | Age: 88
End: 2023-03-20
Attending: INTERNAL MEDICINE
Payer: MEDICARE

## 2023-03-20 VITALS
TEMPERATURE: 97.2 F | RESPIRATION RATE: 16 BRPM | DIASTOLIC BLOOD PRESSURE: 70 MMHG | SYSTOLIC BLOOD PRESSURE: 118 MMHG | OXYGEN SATURATION: 96 % | WEIGHT: 148 LBS | BODY MASS INDEX: 23.53 KG/M2 | HEART RATE: 88 BPM

## 2023-03-20 DIAGNOSIS — R93.89 ABNORMAL CXR: Primary | ICD-10-CM

## 2023-03-20 PROCEDURE — G0463 HOSPITAL OUTPT CLINIC VISIT: HCPCS

## 2023-03-20 PROCEDURE — 99213 OFFICE O/P EST LOW 20 MIN: CPT | Performed by: INTERNAL MEDICINE

## 2023-03-20 ASSESSMENT — ENCOUNTER SYMPTOMS
RESPIRATORY NEGATIVE: 1
CARDIOVASCULAR NEGATIVE: 1
CONSTITUTIONAL NEGATIVE: 1

## 2023-03-20 ASSESSMENT — PAIN SCALES - GENERAL: PAINLEVEL: NO PAIN (0)

## 2023-03-20 NOTE — PROGRESS NOTES
ICD-10-CM    1. Abnormal CXR  R93.89 CT Chest w/o Contrast          He is 3 weeks out from an upper respiratory infection and he feels great.  I am not convinced that he had pneumonia.  I reviewed his chest x-ray, certainly there are some questions with that.  Elected to proceed with CT scanning of the chest and I will let him know the results.        Rolando John is a 87 year old, presenting for the following health issues:  Follow Up      He comes in today for follow-up.  See previous note.  He was treated with Augmentin and doxycycline for a pulmonary infection.  He says he felt great after just 1 day.  The only side effect that he had with the antibiotics was some diarrhea.  He feels back to baseline at this point in time other than maybe a little bit of a cough.  He does not have any recent x-rays to compare to so it is uncertain whether the abnormality seen on chronic or not.  He does have a past history of smoking.    History of Present Illness       Reason for visit:  Followup exray    He eats 2-3 servings of fruits and vegetables daily.He consumes 1 sweetened beverage(s) daily.He exercises with enough effort to increase his heart rate 9 or less minutes per day.  He exercises with enough effort to increase his heart rate 3 or less days per week.   He is taking medications regularly.         Current Outpatient Medications   Medication     amLODIPine (NORVASC) 2.5 MG tablet     aspirin (ASA) 325 MG tablet     atenolol (TENORMIN) 50 MG tablet     fluocinonide (LIDEX) 0.05 % external ointment     hydrochlorothiazide (HYDRODIURIL) 25 MG tablet     losartan (COZAAR) 100 MG tablet     mometasone (ELOCON) 0.1 % external cream     psyllium (METAMUCIL/KONSYL) 0.52 g capsule     simvastatin (ZOCOR) 80 MG tablet     tamsulosin (FLOMAX) 0.4 MG capsule     triamcinolone (KENALOG) 0.1 % external ointment     No current facility-administered medications for this visit.         Review of Systems   Constitutional:  Negative.    Respiratory: Negative.    Cardiovascular: Negative.             Objective    /70   Pulse 88   Temp 97.2  F (36.2  C) (Temporal)   Resp 16   Wt 67.1 kg (148 lb)   SpO2 96%   BMI 23.53 kg/m    Body mass index is 23.53 kg/m .  Physical Exam  Vitals and nursing note reviewed.   Constitutional:       General: He is not in acute distress.     Appearance: Normal appearance. He is not ill-appearing, toxic-appearing or diaphoretic.   Pulmonary:      Breath sounds: Decreased breath sounds present. No wheezing or rhonchi.   Neurological:      Mental Status: He is alert.

## 2023-04-03 ENCOUNTER — HOSPITAL ENCOUNTER (OUTPATIENT)
Dept: CT IMAGING | Facility: OTHER | Age: 88
Discharge: HOME | End: 2023-04-03
Attending: INTERNAL MEDICINE | Admitting: INTERNAL MEDICINE
Payer: MEDICARE

## 2023-04-03 DIAGNOSIS — R93.89 ABNORMAL CXR: ICD-10-CM

## 2023-04-03 PROCEDURE — G1010 CDSM STANSON: HCPCS

## 2023-06-01 ENCOUNTER — HOSPITAL ENCOUNTER (OUTPATIENT)
Dept: GENERAL RADIOLOGY | Facility: OTHER | Age: 88
Discharge: HOME | End: 2023-06-01
Attending: NURSE PRACTITIONER
Payer: MEDICARE

## 2023-06-01 ENCOUNTER — HOSPITAL ENCOUNTER (OUTPATIENT)
Dept: ULTRASOUND IMAGING | Facility: OTHER | Age: 88
Discharge: HOME | End: 2023-06-01
Attending: NURSE PRACTITIONER
Payer: MEDICARE

## 2023-06-01 ENCOUNTER — OFFICE VISIT (OUTPATIENT)
Dept: FAMILY MEDICINE | Facility: OTHER | Age: 88
End: 2023-06-01
Payer: MEDICARE

## 2023-06-01 VITALS
HEART RATE: 78 BPM | RESPIRATION RATE: 18 BRPM | TEMPERATURE: 98.9 F | BODY MASS INDEX: 24.38 KG/M2 | DIASTOLIC BLOOD PRESSURE: 60 MMHG | WEIGHT: 151.7 LBS | SYSTOLIC BLOOD PRESSURE: 148 MMHG | HEIGHT: 66 IN | OXYGEN SATURATION: 98 %

## 2023-06-01 DIAGNOSIS — M10.9 ACUTE GOUT OF RIGHT FOOT, UNSPECIFIED CAUSE: ICD-10-CM

## 2023-06-01 DIAGNOSIS — E79.0 ELEVATED URIC ACID IN BLOOD: ICD-10-CM

## 2023-06-01 DIAGNOSIS — R22.41 LOCALIZED SWELLING OF RIGHT FOOT: Primary | ICD-10-CM

## 2023-06-01 DIAGNOSIS — M79.604 PAIN OF RIGHT LOWER EXTREMITY: ICD-10-CM

## 2023-06-01 LAB
ALBUMIN SERPL BCG-MCNC: 4.1 G/DL (ref 3.5–5.2)
ALP SERPL-CCNC: 57 U/L (ref 40–129)
ALT SERPL W P-5'-P-CCNC: 12 U/L (ref 10–50)
ANION GAP SERPL CALCULATED.3IONS-SCNC: 12 MMOL/L (ref 7–15)
AST SERPL W P-5'-P-CCNC: 19 U/L (ref 10–50)
BASOPHILS # BLD AUTO: 0 10E3/UL (ref 0–0.2)
BASOPHILS NFR BLD AUTO: 0 %
BILIRUB SERPL-MCNC: 0.9 MG/DL
BUN SERPL-MCNC: 23.5 MG/DL (ref 8–23)
CALCIUM SERPL-MCNC: 9.3 MG/DL (ref 8.8–10.2)
CHLORIDE SERPL-SCNC: 97 MMOL/L (ref 98–107)
CREAT SERPL-MCNC: 1.44 MG/DL (ref 0.67–1.17)
D DIMER PPP FEU-MCNC: 1.85 UG/ML FEU (ref 0–0.5)
DEPRECATED HCO3 PLAS-SCNC: 24 MMOL/L (ref 22–29)
EOSINOPHIL # BLD AUTO: 0.1 10E3/UL (ref 0–0.7)
EOSINOPHIL NFR BLD AUTO: 1 %
ERYTHROCYTE [DISTWIDTH] IN BLOOD BY AUTOMATED COUNT: 13.1 % (ref 10–15)
GFR SERPL CREATININE-BSD FRML MDRD: 47 ML/MIN/1.73M2
GLUCOSE SERPL-MCNC: 116 MG/DL (ref 70–99)
HCT VFR BLD AUTO: 35.5 % (ref 40–53)
HGB BLD-MCNC: 12.3 G/DL (ref 13.3–17.7)
IMM GRANULOCYTES # BLD: 0 10E3/UL
IMM GRANULOCYTES NFR BLD: 0 %
LYMPHOCYTES # BLD AUTO: 1 10E3/UL (ref 0.8–5.3)
LYMPHOCYTES NFR BLD AUTO: 11 %
MCH RBC QN AUTO: 32.1 PG (ref 26.5–33)
MCHC RBC AUTO-ENTMCNC: 34.6 G/DL (ref 31.5–36.5)
MCV RBC AUTO: 93 FL (ref 78–100)
MONOCYTES # BLD AUTO: 0.8 10E3/UL (ref 0–1.3)
MONOCYTES NFR BLD AUTO: 9 %
NEUTROPHILS # BLD AUTO: 7 10E3/UL (ref 1.6–8.3)
NEUTROPHILS NFR BLD AUTO: 79 %
NRBC # BLD AUTO: 0 10E3/UL
NRBC BLD AUTO-RTO: 0 /100
PLATELET # BLD AUTO: 151 10E3/UL (ref 150–450)
POTASSIUM SERPL-SCNC: 4.4 MMOL/L (ref 3.4–5.3)
PROT SERPL-MCNC: 6.8 G/DL (ref 6.4–8.3)
RBC # BLD AUTO: 3.83 10E6/UL (ref 4.4–5.9)
SODIUM SERPL-SCNC: 133 MMOL/L (ref 136–145)
URATE SERPL-MCNC: 7.1 MG/DL (ref 3.4–7)
WBC # BLD AUTO: 8.9 10E3/UL (ref 4–11)

## 2023-06-01 PROCEDURE — G0463 HOSPITAL OUTPT CLINIC VISIT: HCPCS | Mod: 25

## 2023-06-01 PROCEDURE — 85014 HEMATOCRIT: CPT | Mod: ZL | Performed by: NURSE PRACTITIONER

## 2023-06-01 PROCEDURE — 36415 COLL VENOUS BLD VENIPUNCTURE: CPT | Mod: ZL | Performed by: NURSE PRACTITIONER

## 2023-06-01 PROCEDURE — 85379 FIBRIN DEGRADATION QUANT: CPT | Mod: ZL | Performed by: NURSE PRACTITIONER

## 2023-06-01 PROCEDURE — 73630 X-RAY EXAM OF FOOT: CPT | Mod: RT

## 2023-06-01 PROCEDURE — 99214 OFFICE O/P EST MOD 30 MIN: CPT | Performed by: NURSE PRACTITIONER

## 2023-06-01 PROCEDURE — 82947 ASSAY GLUCOSE BLOOD QUANT: CPT | Mod: ZL | Performed by: NURSE PRACTITIONER

## 2023-06-01 PROCEDURE — 84550 ASSAY OF BLOOD/URIC ACID: CPT | Mod: ZL | Performed by: NURSE PRACTITIONER

## 2023-06-01 PROCEDURE — 93971 EXTREMITY STUDY: CPT | Mod: RT

## 2023-06-01 RX ORDER — PREDNISONE 20 MG/1
40 TABLET ORAL DAILY
Qty: 14 TABLET | Refills: 0 | Status: SHIPPED | OUTPATIENT
Start: 2023-06-01 | End: 2023-06-08

## 2023-06-01 ASSESSMENT — PAIN SCALES - GENERAL: PAINLEVEL: EXTREME PAIN (8)

## 2023-06-01 NOTE — PATIENT INSTRUCTIONS
Uric acid level today is elevated which is common in gout.  She denies right sided foot pain and swelling certainly could be caused by gout.  It is greatly reassuring that we have had a negative x-ray and ultrasound today to have ruled out any acute fractures as well as a blood clot.  We will treat this as a gout flare.  If you do not improve or if your symptoms worsen or change you will need close follow-up.    If you develop chest pain, shortness of breath, cough this morning or any overall concerns or your symptoms please present to the ED.

## 2023-06-01 NOTE — NURSING NOTE
Chief Complaint   Patient presents with     Musculoskeletal Problem     right     Patient presents to the clinic today for right foot pain. Right foot is swollen woke up tuesday morning when he woke up.     Elizabeth Alvarado LPN       FOOD SECURITY SCREENING QUESTIONS:    The next two questions are to help us understand your food security.  If you are feeling you need any assistance in this area, we have resources available to support you today.    Hunger Vital Signs:  Within the past 12 months we worried whether our food would run out before we got money to buy more. Never  Within the past 12 months the food we bought just didn't last and we didn't have money to get more. Never  Elizabeth Alvarado LPN,LPN on 6/1/2023 at 1:42 PM    Food Insecurity: Not on file

## 2023-06-01 NOTE — PROGRESS NOTES
ASSESSMENT/PLAN:    I have reviewed the nursing notes.  I have reviewed the findings, diagnosis, plan and need for follow up with the patient.    1. Localized swelling of right foot  2. Pain of right lower extremity  - CBC and Differential  - Uric acid  - D dimer quantitative  - Comprehensive Metabolic Panel  - XR Foot Right G/E 3 Views  - US Lower Extremity Venous Duplex Right  - predniSONE (DELTASONE) 20 MG tablet; Take 2 tablets (40 mg) by mouth daily for 7 days  Dispense: 14 tablet; Refill: 0  Normal cbc, uric acid is elevated at 7.1 today. D-dimer elevated at 1.85 but ultrasound of right lower extremity has ruled out DVT, which was my biggest concern upon examining patient today. No prior history of gout, and although there is no erythema, the pain and swelling being unilateral is consistent with gout. Felt appropriate to treat as gout with prednisone. Patient has CKD, today's GFR is 47, which increases risk of gout. Creatinine 1.44 today. Opted to treat with prednisone. Discussed side effect profile. Patient is agreeable to return for close follow up if no improvement or if worsening condition (swelling, pain) , or if he develops chest pain, shortness of breath, calf pain/tenderness, or if the other leg becomes edematous. Information also provided to Alvaro regarding gout and diet changes etc.        3. Acute gout of right foot, unspecified cause  4. Elevated uric acid in blood  - predniSONE (DELTASONE) 20 MG tablet; Take 2 tablets (40 mg) by mouth daily for 7 days  Dispense: 14 tablet; Refill: 0    Discussed warning signs/symptoms indicative of need to f/u    Follow up if symptoms persist or worsen or concerns    I explained my diagnostic considerations and recommendations to the patient, who voiced understanding and agreement with the treatment plan. All questions were answered. We discussed potential side effects of any prescribed or recommended therapies, as well as expectations for response to  treatments.    Prachi Butts NP  6/1/2023  1:53 PM    HPI:  Alvaro Bass Jr. is a 87 year old male who presents to [facility] today for concerns of right foot pain that is very bothersome, painful and swollen. The swelling was present upon awakening on Tuesday morning (3 days ago) and has gotten worse.The whole foot is painful starting around the PIP joint of the right foot but really it hurts everywhere on the foot. He denies any chest pain, shortness of breath, cough. No known hx of DVT or PE. No palpitations.   No known falls or injury to the foot. No recent changes to activity or exercises or excessive walking or overuse. He denies any known history of gout. He is wondering if he has a broken bone, he is specifically interested in getting an xr today for sure if possible. The left leg is normal, unaffected. No redness. No tenderness or redness or pain of the calf. No recent changes to medications. Limping around on it for couple days. It is really the pain of the foot that brought him here today he states. No recent car rides or airplane travel.     On aspirin daily, no other blood thinners.     Has a pace maker.     ROS otherwise negative.     Past Medical History:   Diagnosis Date     Atherosclerotic heart disease of native coronary artery without angina pectoris     No Comments Provided     Atrioventricular block, complete (H)     No Comments Provided     Bladder neck obstruction     No Comments Provided     Chronic kidney disease, stage III (moderate) (H)     8/21/2014     Diverticulosis of large intestine without perforation or abscess without bleeding     3/9/2015     Essential (primary) hypertension     No Comments Provided     History of colonic polyps     2000,not adenomatous     Hyperlipidemia     No Comments Provided     Non-ST elevation (NSTEMI) myocardial infarction (H)     2/28/1999,with favorable post myocardial infarction thallium studies.     Occlusion and stenosis of unspecified carotid  artery     2012,Moderate <60% bilateral on US     Past Surgical History:   Procedure Laterality Date     APPENDECTOMY OPEN       COLONOSCOPY      2010,Numerous polyps-all hyperplastic.  Next due .     COLONOSCOPY      3/9/15,F/U N/A age     CYSTOSCOPY      No Comments Provided     EXTRACAPSULAR CATARACT EXTRATION WITH INTRAOCULAR LENS IMPLANT Bilateral      HEART CATH, ANGIOPLASTY      ,right coronary artery     HERNIA REPAIR Right 2016    LAPAROSCOPIC INCARCERATED INGUINAL HERNIA REPAIR     IMPLANT PACEMAKER      2005,Dual chamber pacemaker, [employer], model:  E2ER01     REPLACE PACEMAKER GENERATOR      2013     Social History     Tobacco Use     Smoking status: Former     Types: Cigarettes     Quit date: 1999     Years since quittin.4     Smokeless tobacco: Never   Vaping Use     Vaping status: Not on file   Substance Use Topics     Alcohol use: No     Alcohol/week: 0.0 standard drinks of alcohol     Current Outpatient Medications   Medication Sig Dispense Refill     amLODIPine (NORVASC) 2.5 MG tablet Take 1 tablet (2.5 mg) by mouth daily 90 tablet 3     aspirin (ASA) 325 MG tablet Take 325 mg by mouth daily with food       atenolol (TENORMIN) 50 MG tablet Take 1 tablet (50 mg) by mouth daily 90 tablet 3     fluocinonide (LIDEX) 0.05 % external ointment Apply topically 2 times daily       hydrochlorothiazide (HYDRODIURIL) 25 MG tablet Take 1 tablet (25 mg) by mouth daily 90 tablet 3     losartan (COZAAR) 100 MG tablet Take 1 tablet (100 mg) by mouth daily 90 tablet 3     mometasone (ELOCON) 0.1 % external cream Apply to ears with clean hands twice a day for 14 days then as needed 45 g 2     predniSONE (DELTASONE) 20 MG tablet Take 2 tablets (40 mg) by mouth daily for 7 days 14 tablet 0     psyllium (METAMUCIL/KONSYL) 0.52 g capsule Take 1 capsule by mouth At Bedtime       simvastatin (ZOCOR) 80 MG tablet Take 0.5 tablets (40 mg) by mouth daily 45 tablet 3     tamsulosin (FLOMAX)  "0.4 MG capsule Take 1 capsule (0.4 mg) by mouth daily with food 90 capsule 3     triamcinolone (KENALOG) 0.1 % external ointment Apply topically 3 times daily 80 g 2     No Known Allergies  Past medical history, past surgical history, current medications and allergies reviewed and accurate to the best of my knowledge.      ROS:  Refer to HPI    BP (!) 148/60   Pulse 78   Temp 98.9  F (37.2  C) (Tympanic)   Resp 18   Ht 1.676 m (5' 6\")   Wt 68.8 kg (151 lb 11.2 oz)   SpO2 98%   BMI 24.49 kg/m      EXAM:  General Appearance: Well appearing 87 year old male, appropriate appearance for age. No acute distress   Respiratory: normal chest wall and respirations.  Normal effort.  Clear to auscultation bilaterally, no wheezing, crackles or rhonchi.  No increased work of breathing.  No cough appreciated.  Cardiac: RRR   Musculoskeletal:  Equal movement of bilateral upper extremities.  Equal movement of bilateral lower extremities.  +Limping due to pain of right foot   Right foot up to the ankle:  +there is unilateral right lower extremity pitting edema 2+ without erythema. The entire foot is tender. Distal pulse is palpable, foot is warm without discoloration. Sensation intact. The skin is intact.   There is no erythema, tenderness, swelling, or warmth to the calf of right leg.   The left foot is without edema, erythema, tenderness.   Psychological: normal affect, alert, oriented, and pleasant.   Results for orders placed or performed in visit on 06/01/23   XR Foot Right G/E 3 Views     Status: None    Narrative    PROCEDURE:  XR FOOT RIGHT G/E 3 VIEWS    HISTORY: spontaneous unilateral foot swelling and pain; Localized  swelling of right foot; Pain of right lower extremity    COMPARISON:  None.    TECHNIQUE:  3 views of the right foot were obtained.    FINDINGS:  No fracture or dislocation is identified. The joint spaces  are preserved. There is bony spurring at the insertion of the Achilles  tendon into the calcaneus. "       Impression    IMPRESSION: No acute foot abnormality.      AMANDA AL MD         SYSTEM ID:  [id number]   US Lower Extremity Venous Duplex Right     Status: None    Narrative    US LOWER EXTREMITY VENOUS DUPLEX RIGHT  6/1/2023 3:01 PM    History:Male, age 87 years; ; r/o DVT; Localized swelling of right  foot; Pain of right lower extremity    Comparison: No relevant prior imaging.    Technique: Grayscale and color Doppler ultrasound of the right  lower  extremity deep venous structures.    Findings:   The deep venous structures are patent and fully compressible. There is  normal augmentation of flow.      Impression    Impression:  No evidence of DVT.    FUNMILAYO MCKENNA MD         SYSTEM ID:  [id number]   Uric acid     Status: Abnormal   Result Value Ref Range    Uric Acid 7.1 (H) 3.4 - 7.0 mg/dL   D dimer quantitative     Status: Abnormal   Result Value Ref Range    D-Dimer Quantitative 1.85 (H) 0.00 - 0.50 ug/mL FEU    Narrative    This D-dimer assay is intended for use in conjunction with a clinical pretest probability assessment model to exclude pulmonary embolism (PE) and deep venous thrombosis (DVT) in outpatients suspected of PE or DVT. The cut-off value is 0.50 ug/mL FEU.   Comprehensive Metabolic Panel     Status: Abnormal   Result Value Ref Range    Sodium 133 (L) 136 - 145 mmol/L    Potassium 4.4 3.4 - 5.3 mmol/L    Chloride 97 (L) 98 - 107 mmol/L    Carbon Dioxide (CO2) 24 22 - 29 mmol/L    Anion Gap 12 7 - 15 mmol/L    Urea Nitrogen 23.5 (H) 8.0 - 23.0 mg/dL    Creatinine 1.44 (H) 0.67 - 1.17 mg/dL    Calcium 9.3 8.8 - 10.2 mg/dL    Glucose 116 (H) 70 - 99 mg/dL    Alkaline Phosphatase 57 40 - 129 U/L    AST 19 10 - 50 U/L    ALT 12 10 - 50 U/L    Protein Total 6.8 6.4 - 8.3 g/dL    Albumin 4.1 3.5 - 5.2 g/dL    Bilirubin Total 0.9 <=1.2 mg/dL    GFR Estimate 47 (L) >60 mL/min/1.73m2   CBC with platelets and differential     Status: Abnormal   Result Value Ref Range    WBC Count 8.9 4.0 - 11.0  10e3/uL    RBC Count 3.83 (L) 4.40 - 5.90 10e6/uL    Hemoglobin 12.3 (L) 13.3 - 17.7 g/dL    Hematocrit 35.5 (L) 40.0 - 53.0 %    MCV 93 78 - 100 fL    MCH 32.1 26.5 - 33.0 pg    MCHC 34.6 31.5 - 36.5 g/dL    RDW 13.1 10.0 - 15.0 %    Platelet Count 151 150 - 450 10e3/uL    % Neutrophils 79 %    % Lymphocytes 11 %    % Monocytes 9 %    % Eosinophils 1 %    % Basophils 0 %    % Immature Granulocytes 0 %    NRBCs per 100 WBC 0 <1 /100    Absolute Neutrophils 7.0 1.6 - 8.3 10e3/uL    Absolute Lymphocytes 1.0 0.8 - 5.3 10e3/uL    Absolute Monocytes 0.8 0.0 - 1.3 10e3/uL    Absolute Eosinophils 0.1 0.0 - 0.7 10e3/uL    Absolute Basophils 0.0 0.0 - 0.2 10e3/uL    Absolute Immature Granulocytes 0.0 <=0.4 10e3/uL    Absolute NRBCs 0.0 10e3/uL   CBC and Differential     Status: Abnormal    Narrative    The following orders were created for panel order CBC and Differential.  Procedure                               Abnormality         Status                     ---------                               -----------         ------                     CBC with platelets and d...[[id number]]  Abnormal            Final result                 Please view results for these tests on the individual orders.

## 2023-07-31 ENCOUNTER — OFFICE VISIT (OUTPATIENT)
Dept: FAMILY MEDICINE | Facility: OTHER | Age: 88
End: 2023-07-31
Payer: MEDICARE

## 2023-07-31 VITALS
RESPIRATION RATE: 18 BRPM | HEART RATE: 64 BPM | DIASTOLIC BLOOD PRESSURE: 70 MMHG | TEMPERATURE: 97.3 F | WEIGHT: 146.7 LBS | SYSTOLIC BLOOD PRESSURE: 111 MMHG | OXYGEN SATURATION: 98 % | HEIGHT: 67 IN | BODY MASS INDEX: 23.02 KG/M2
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
## 2023-07-31 DIAGNOSIS — S30.861A TICK BITE OF ABDOMINAL WALL, INITIAL ENCOUNTER: Primary | ICD-10-CM

## 2023-07-31 LAB
ALBUMIN SERPL BCG-MCNC: 4.3 G/DL (ref 3.5–5.2)
ALP SERPL-CCNC: 66 U/L (ref 40–129)
ALT SERPL W P-5'-P-CCNC: 14 U/L (ref 0–70)
ANION GAP SERPL CALCULATED.3IONS-SCNC: 9 MMOL/L (ref 7–15)
AST SERPL W P-5'-P-CCNC: 22 U/L (ref 0–45)
BASOPHILS # BLD AUTO: 0 10E3/UL (ref 0–0.2)
BASOPHILS NFR BLD AUTO: 1 %
BILIRUB SERPL-MCNC: 0.7 MG/DL
BUN SERPL-MCNC: 24.9 MG/DL (ref 8–23)
CALCIUM SERPL-MCNC: 9.6 MG/DL (ref 8.8–10.2)
CHLORIDE SERPL-SCNC: 98 MMOL/L (ref 98–107)
CREAT SERPL-MCNC: 1.45 MG/DL (ref 0.67–1.17)
DEPRECATED HCO3 PLAS-SCNC: 26 MMOL/L (ref 22–29)
EOSINOPHIL # BLD AUTO: 0.2 10E3/UL (ref 0–0.7)
EOSINOPHIL NFR BLD AUTO: 3 %
ERYTHROCYTE [DISTWIDTH] IN BLOOD BY AUTOMATED COUNT: 13.2 % (ref 10–15)
GFR SERPL CREATININE-BSD FRML MDRD: 46 ML/MIN/1.73M2
GLUCOSE SERPL-MCNC: 79 MG/DL (ref 70–99)
HCT VFR BLD AUTO: 35.5 % (ref 40–53)
HGB BLD-MCNC: 12.2 G/DL (ref 13.3–17.7)
IMM GRANULOCYTES # BLD: 0 10E3/UL
IMM GRANULOCYTES NFR BLD: 0 %
LYMPHOCYTES # BLD AUTO: 1 10E3/UL (ref 0.8–5.3)
LYMPHOCYTES NFR BLD AUTO: 16 %
MCH RBC QN AUTO: 31 PG (ref 26.5–33)
MCHC RBC AUTO-ENTMCNC: 34.4 G/DL (ref 31.5–36.5)
MCV RBC AUTO: 90 FL (ref 78–100)
MONOCYTES # BLD AUTO: 0.8 10E3/UL (ref 0–1.3)
MONOCYTES NFR BLD AUTO: 12 %
NEUTROPHILS # BLD AUTO: 4.5 10E3/UL (ref 1.6–8.3)
NEUTROPHILS NFR BLD AUTO: 68 %
NRBC # BLD AUTO: 0 10E3/UL
NRBC BLD AUTO-RTO: 0 /100
PLATELET # BLD AUTO: 153 10E3/UL (ref 150–450)
POTASSIUM SERPL-SCNC: 4.1 MMOL/L (ref 3.4–5.3)
PROT SERPL-MCNC: 6.6 G/DL (ref 6.4–8.3)
RBC # BLD AUTO: 3.94 10E6/UL (ref 4.4–5.9)
SODIUM SERPL-SCNC: 133 MMOL/L (ref 136–145)
WBC # BLD AUTO: 6.5 10E3/UL (ref 4–11)

## 2023-07-31 PROCEDURE — 86618 LYME DISEASE ANTIBODY: CPT | Mod: ZL | Performed by: STUDENT IN AN ORGANIZED HEALTH CARE EDUCATION/TRAINING PROGRAM

## 2023-07-31 PROCEDURE — G0463 HOSPITAL OUTPT CLINIC VISIT: HCPCS

## 2023-07-31 PROCEDURE — 85004 AUTOMATED DIFF WBC COUNT: CPT | Mod: ZL | Performed by: STUDENT IN AN ORGANIZED HEALTH CARE EDUCATION/TRAINING PROGRAM

## 2023-07-31 PROCEDURE — 87468 ANAPLSMA PHGCYTOPHLM AMP PRB: CPT | Mod: ZL | Performed by: STUDENT IN AN ORGANIZED HEALTH CARE EDUCATION/TRAINING PROGRAM

## 2023-07-31 PROCEDURE — 99213 OFFICE O/P EST LOW 20 MIN: CPT | Performed by: STUDENT IN AN ORGANIZED HEALTH CARE EDUCATION/TRAINING PROGRAM

## 2023-07-31 PROCEDURE — 87798 DETECT AGENT NOS DNA AMP: CPT | Mod: ZL | Performed by: STUDENT IN AN ORGANIZED HEALTH CARE EDUCATION/TRAINING PROGRAM

## 2023-07-31 PROCEDURE — 36415 COLL VENOUS BLD VENIPUNCTURE: CPT | Mod: ZL | Performed by: STUDENT IN AN ORGANIZED HEALTH CARE EDUCATION/TRAINING PROGRAM

## 2023-07-31 PROCEDURE — 80053 COMPREHEN METABOLIC PANEL: CPT | Mod: ZL | Performed by: STUDENT IN AN ORGANIZED HEALTH CARE EDUCATION/TRAINING PROGRAM

## 2023-07-31 ASSESSMENT — PAIN SCALES - GENERAL: PAINLEVEL: NO PAIN (0)

## 2023-07-31 NOTE — NURSING NOTE
"Chief Complaint   Patient presents with    Insect Bites     Tick bite about 2 weeks ago. Right side abdomen. Fatigue since.         Initial /70 (BP Location: Left arm, Patient Position: Sitting, Cuff Size: Adult Regular)   Pulse 64   Temp 97.3  F (36.3  C) (Tympanic)   Resp 18   Ht 1.702 m (5' 7\")   Wt 66.5 kg (146 lb 11.2 oz)   SpO2 98%   BMI 22.98 kg/m   Estimated body mass index is 22.98 kg/m  as calculated from the following:    Height as of this encounter: 1.702 m (5' 7\").    Weight as of this encounter: 66.5 kg (146 lb 11.2 oz).   Advance Care Directive on file? yes  Advance Care Directive provided to patient?     FOOD SECURITY SCREENING QUESTIONS:    The next two questions are to help us understand your food security.  If you are feeling you need any assistance in this area, we have resources available to support you today.    Hunger Vital Signs:  Within the past 12 months we worried whether our food would run out before we got money to buy more. Never  Within the past 12 months the food we bought just didn't last and we didn't have money to get more. Never        Tanesha Draper     "

## 2023-08-01 NOTE — PROGRESS NOTES
Assessment & Plan     (S30.861A,  W57.XXXA) Tick bite of abdominal wall, initial encounter  (primary encounter diagnosis)  Comment: Appearance of healing tick bite.  Unsure of type of tick.  He is 2.5 weeks into symptoms.  Lab work was completed today.  CBC without any evidence of elevated or decreased white blood cells, platelets within normal limits.  Hemoglobin is chronically slightly decreased from normal limits at 12.2 today.  No elevated liver enzymes.  Tickborne panel is pending including Lyme, ehrlichia, anaplasma, and Babesia.  Treat only based on results.  Discussed that he may need repeat testing in 2-4 more weeks if he still has symptoms.  Dizziness and fatigue could mean other illness as well.  He has had his carotid arteries checked this year.  He does note some hypotension at home, he is on multiple blood pressure medications.    Plan: CBC and Differential, Comprehensive Metabolic         Panel, Lyme Disease Total Abs Bld with Reflex         to Confirm CLIA, Ehrlichia Anaplasma Sp by PCR,        Babesia Species by PCR        At this time, continue to monitor blood pressure.  Lots of fluids.  Follow-up with PCP for further evaluation and management of dizziness and fatigue if this does continue to persist.  Discussed that he would need to go to the emergency room or return [facility] if symptoms worsen or change in the meantime.  He is agreeable.    Federica Huff PA-C  [facility]    Rolando John is a 88 year old, presenting for the following health issues:  Insect Bites (Tick bite about 2 weeks ago. Right side abdomen. Fatigue since.)    HPI     Patient presents today for concerns of tick bite on his abdomen that happened on 7/12/2023.  He states he pulled it off at that day.  He noted slight redness and itching.  He thinks it may have gone on for about 24 hours.  He thinks it was a larger tick but could have been a deer tick.  He noticed that after that he has had  "some dizziness and fatigue.  He has been checking his blood pressure which he feels like has been lower than his usual.  Numbers of 93/53 and 100/56.  He continues to drink lots of fluids.    Review of Systems   He denies any fevers, chills, sweats, headaches, joint pain, or abnormal rashes.        Objective    /70 (BP Location: Left arm, Patient Position: Sitting, Cuff Size: Adult Regular)   Pulse 64   Temp 97.3  F (36.3  C) (Tympanic)   Resp 18   Ht 1.702 m (5' 7\")   Wt 66.5 kg (146 lb 11.2 oz)   SpO2 98%   BMI 22.98 kg/m    Body mass index is 22.98 kg/m .    Physical Exam   GENERAL: healthy, alert and no distress  EYES: Eyes grossly normal to inspection, PERRL and conjunctivae and sclerae normal  HENT: ear canals and TM's normal, nose and mouth without ulcers or lesions  NECK: no adenopathy, no asymmetry, masses  RESP: lungs clear to auscultation - no rales, rhonchi or wheezes  CV: regular rate and rhythm, normal S1 S2, no S3 or S4, no murmur, click or rub, no peripheral edema and radial pulses strong, carotid pulses 2+ bilaterally  ABDOMEN: soft, nontender, no hepatosplenomegaly, no masses and bowel sounds normal  MS: no gross musculoskeletal defects noted, no edema  Integumentary: Pinpoint red iris on right mid abdomen with slight firmness, no surrounding erythema, edema, warmth, no tenderness with palpation            "

## 2023-08-02 LAB — B BURGDOR IGG+IGM SER QL: 0.13

## 2023-08-03 LAB
A PHAGOCYTOPH DNA BLD QL NAA+PROBE: NOT DETECTED
B MICROTI DNA BLD QL NAA+PROBE: NOT DETECTED
BABESIA DNA BLD QL NAA+PROBE: NOT DETECTED
E CHAFFEENSIS DNA BLD QL NAA+PROBE: NOT DETECTED
E EWINGII DNA SPEC QL NAA+PROBE: NOT DETECTED
EHRLICHIA DNA SPEC QL NAA+PROBE: NOT DETECTED

## 2023-08-21 ENCOUNTER — VIRTUAL VISIT (OUTPATIENT)
Dept: FAMILY MEDICINE | Facility: OTHER | Age: 88
End: 2023-08-21
Payer: MEDICARE

## 2023-08-21 DIAGNOSIS — U07.1 INFECTION DUE TO 2019 NOVEL CORONAVIRUS: ICD-10-CM

## 2023-08-21 DIAGNOSIS — R05.1 ACUTE COUGH: Primary | ICD-10-CM

## 2023-08-21 PROCEDURE — 99442 PR PHYSICIAN TELEPHONE EVALUATION 11-20 MIN: CPT | Mod: 95 | Performed by: NURSE PRACTITIONER

## 2023-08-21 PROCEDURE — C9803 HOPD COVID-19 SPEC COLLECT: HCPCS | Mod: TEL

## 2023-08-21 NOTE — PROGRESS NOTES
"Alvaro is a 88 year old who is being evaluated via a billable telephone visit.      What phone number would you like to be contacted at? [phone]  How would you like to obtain your AVS? Clementet    Distant Location (provider location):  On-site    Subjective   Alvaro is a 88 year old, presenting for the following health issues:  No chief complaint on file.    HPI     Patient started feeling unwell on 8/19/2023, he is on day 2 of symptoms.  He has a dry cough, generalized body aches, some weakness and nausea.  He states he is still eating and drinking.  He denies any wheezing or shortness of breath.  He tested positive for COVID-19 today.  He would like to discuss antiviral treatment.    COVID-19 Symptom Review  How many days ago did these symptoms start? 2    Are any of the following symptoms significant for you?  New or worsening difficulty breathing? No  Worsening cough? Yes, it's a dry cough.   Fever or chills? No  Headache: No  Sore throat: No  Chest pain: No  Diarrhea: No  Body aches? YES    What treatments has patient tried?  Does patient live in a nursing home, group home, or shelter? No  Does patient have a way to get food/medications during quarantined? Yes, I have a friend or family member who can help me.    Review of Systems   Constitutional, HEENT, cardiovascular, pulmonary, gi and gu systems are negative, except as otherwise noted.      Objective    Vitals - Patient Reported  Weight (Patient Reported): 66.2 kg (146 lb)  Height (Patient Reported): 170.2 cm (5' 7\")  BMI (Based on Pt Reported Ht/Wt): 22.87  Pain Score: Mild Pain (3)  Pain Loc: Chest      Physical Exam   healthy, alert, and no distress  PSYCH: Alert and oriented times 3; coherent speech, normal   rate and volume, able to articulate logical thoughts, able   to abstract reason, no tangential thoughts, no hallucinations   or delusions  His affect is normal  RESP: No cough, no audible wheezing, able to talk in full sentences  Remainder of exam " unable to be completed due to telephone visits    ASSESSMENT/PLAN  1. Acute cough  with  2. Infection due to 2019 novel coronavirus  - nirmatrelvir and ritonavir (PAXLOVID) 150 mg/100 mg therapy pack; Take 2 tablets by mouth 2 times daily for 5 days (Take one tablet of Nirmatelvir and 1 tablet of Ritonavir twice daily for 5 days)  Dispense: 20 tablet; Refill: 0    Patient is on day 2 of symptoms, positive COVID-19 test today at home.  We review isolation and CDC guidelines for quarantine.  Does live at home alone however he does have help to run to the store and to the pharmacy for him.  Risks and benefits of Paxlovid treatment reviewed with patient, most common adverse side effects.  He does have slightly declined kidney function therefore will be on renal reduced dose of Paxlovid antiviral treatment.  He is aware to hold his simvastatin cholesterol medication for the next 8 days while he is on antiviral treatment.  I recommend he go to the emergency department should he develop any worsening symptoms, shortness of breath, wheezing or is unable to tolerate oral intake.  Discussed use of Tylenol as needed for headaches, body aches.  Patient verbalizes understanding.    Phone call duration: 10 minutes   [phone]

## 2023-08-21 NOTE — PATIENT INSTRUCTIONS
Paxlovid antiviral treatment sent to your pharmacy, please take 2 tablets, morning and night for a total of 5 days.  This antiviral medications goal is to help reduce severity of symptoms, stop the virus from continuing to replicate, and have a 90% success rate at preventing complications of COVID-19 such as a secondary pneumonia and hospitalization.    You do have a slightly declined kidney function therefore will be on a slightly reduced dose of Paxlovid however this will still be effective in improving her symptoms of COVID-19.    Please hold your cholesterol medication, atorvastatin for a total of 8 days, you may resume taking this on 8/29/2023    You should quarantine at home for 5 days from onset of symptoms, which would bring you till this Thursday, 8/24/2023.  If you are still developing new symptoms, have a fever, please extend your quarantine/isolation.    Please get adequate rest, stay well-hydrated.  You may take Tylenol as needed for body aches, headache or fever.  Should you develop a sore throat, gargling with salt water, throat lozenges, hot tea with lemon or honey can all be beneficial in managing your symptoms.    If you develop worsening symptoms, are unable to tolerate liquids or eat, have worsening wheezing, shortness of breath or cough please go to the emergency department for further evaluation.

## 2023-11-22 ENCOUNTER — TELEPHONE (OUTPATIENT)
Dept: INTERNAL MEDICINE | Facility: OTHER | Age: 88
End: 2023-11-22
Payer: MEDICARE

## 2023-11-22 NOTE — TELEPHONE ENCOUNTER
Patient's pacemaker is due to be replaced something next year. Is there a surgeon at [facility] that can perform the operation? Please call.    Brianne Doyle on 11/22/2023 at 1:44 PM

## 2023-11-24 NOTE — TELEPHONE ENCOUNTER
Two calls placed to patient's mobile number and although he answered and I could hear him, he could not hear me. Voicemail left on home phone to return call and ask to be put through to an RN. RNDOMN message sent. Blanca Pan RN on 11/24/2023 at 12:05 PM

## 2023-11-24 NOTE — TELEPHONE ENCOUNTER
Dr. Tamica Galvin replaced patient's pacemaker battery 8/21/13 and he states it is due to be replaced sometime in the next year. He would like a call back next week as to how to go about scheduling this. Blanca Pan, RN on 11/24/2023 at 12:14 PM      Procedures by Tamica Galvin MD at 8/21/2013 11:47 AM      Author:  Tamica Galvin MD Service:  (none) Author Type:  Physician     Filed:  8/21/2013 11:51 AM Date of Service:  8/21/2013 11:47 AM Status:  Signed     :  Tamica Galvin MD (Physician)           Pre-procedure Diagnoses:     1. Pacemaker at end of battery life [V53.31]              Post-procedure Diagnoses:     1. Pacemaker at end of battery life [V53.31]              Procedures:     1. PACEMAKER REPLACEMENT [898100 (Custom)]

## 2023-11-27 NOTE — TELEPHONE ENCOUNTER
We can schedule the patient for a battery change with Dr. Alford.      IMPLANT PACEMAKER   1/2005,Dual chamber pacemaker, [employer], model: E2ER01    REPLACE PACEMAKER GENERATOR   8/21/2013     Patient can have primary care provider. Send over a referral for it to be changed when it is due and than we can schedule him at that time.     Attempted to reach patient. Left message and also sent message via [employer] to the patient.     Mamta Pennington RN  ....................  11/27/2023   9:07 AM

## 2023-11-27 NOTE — TELEPHONE ENCOUNTER
Patient called back. Updated the patient. Patient verbalized understanding and no further questions at this time. Mamta Pennington RN  ....................  11/27/2023   9:31 AM

## 2023-12-11 ENCOUNTER — HOSPITAL ENCOUNTER (EMERGENCY)
Facility: OTHER | Age: 88
Discharge: HOME | End: 2023-12-12
Attending: FAMILY MEDICINE | Admitting: FAMILY MEDICINE
Payer: COMMERCIAL

## 2023-12-11 DIAGNOSIS — I10 PRIMARY HYPERTENSION: ICD-10-CM

## 2023-12-11 PROCEDURE — 99283 EMERGENCY DEPT VISIT LOW MDM: CPT | Performed by: FAMILY MEDICINE

## 2023-12-11 PROCEDURE — 99284 EMERGENCY DEPT VISIT MOD MDM: CPT | Performed by: FAMILY MEDICINE

## 2023-12-11 PROCEDURE — 93005 ELECTROCARDIOGRAM TRACING: CPT | Performed by: FAMILY MEDICINE

## 2023-12-12 VITALS
HEART RATE: 60 BPM | OXYGEN SATURATION: 98 % | RESPIRATION RATE: 20 BRPM | BODY MASS INDEX: 22.91 KG/M2 | SYSTOLIC BLOOD PRESSURE: 146 MMHG | HEIGHT: 67 IN | WEIGHT: 146 LBS | DIASTOLIC BLOOD PRESSURE: 74 MMHG | TEMPERATURE: 97.8 F

## 2023-12-12 LAB
ANION GAP SERPL CALCULATED.3IONS-SCNC: 12 MMOL/L (ref 7–15)
ATRIAL RATE - MUSE: 70 BPM
BASOPHILS # BLD AUTO: 0 10E3/UL (ref 0–0.2)
BASOPHILS NFR BLD AUTO: 0 %
BUN SERPL-MCNC: 26.2 MG/DL (ref 8–23)
CALCIUM SERPL-MCNC: 9.5 MG/DL (ref 8.8–10.2)
CHLORIDE SERPL-SCNC: 97 MMOL/L (ref 98–107)
CREAT SERPL-MCNC: 1.49 MG/DL (ref 0.67–1.17)
DEPRECATED HCO3 PLAS-SCNC: 22 MMOL/L (ref 22–29)
DIASTOLIC BLOOD PRESSURE - MUSE: NORMAL MMHG
EGFRCR SERPLBLD CKD-EPI 2021: 45 ML/MIN/1.73M2
EOSINOPHIL # BLD AUTO: 0.1 10E3/UL (ref 0–0.7)
EOSINOPHIL NFR BLD AUTO: 2 %
ERYTHROCYTE [DISTWIDTH] IN BLOOD BY AUTOMATED COUNT: 12.9 % (ref 10–15)
GLUCOSE SERPL-MCNC: 116 MG/DL (ref 70–99)
HCT VFR BLD AUTO: 35.6 % (ref 40–53)
HGB BLD-MCNC: 12.6 G/DL (ref 13.3–17.7)
HOLD SPECIMEN: NORMAL
HOLD SPECIMEN: NORMAL
IMM GRANULOCYTES # BLD: 0 10E3/UL
IMM GRANULOCYTES NFR BLD: 0 %
INTERPRETATION ECG - MUSE: NORMAL
LYMPHOCYTES # BLD AUTO: 1.5 10E3/UL (ref 0.8–5.3)
LYMPHOCYTES NFR BLD AUTO: 25 %
MCH RBC QN AUTO: 32.3 PG (ref 26.5–33)
MCHC RBC AUTO-ENTMCNC: 35.4 G/DL (ref 31.5–36.5)
MCV RBC AUTO: 91 FL (ref 78–100)
MONOCYTES # BLD AUTO: 0.6 10E3/UL (ref 0–1.3)
MONOCYTES NFR BLD AUTO: 10 %
NEUTROPHILS # BLD AUTO: 3.8 10E3/UL (ref 1.6–8.3)
NEUTROPHILS NFR BLD AUTO: 63 %
NRBC # BLD AUTO: 0 10E3/UL
NRBC BLD AUTO-RTO: 0 /100
P AXIS - MUSE: 30 DEGREES
PLATELET # BLD AUTO: 143 10E3/UL (ref 150–450)
POTASSIUM SERPL-SCNC: 4 MMOL/L (ref 3.4–5.3)
PR INTERVAL - MUSE: 218 MS
QRS DURATION - MUSE: 174 MS
QT - MUSE: 440 MS
QTC - MUSE: 475 MS
R AXIS - MUSE: -74 DEGREES
RBC # BLD AUTO: 3.9 10E6/UL (ref 4.4–5.9)
SODIUM SERPL-SCNC: 131 MMOL/L (ref 135–145)
SYSTOLIC BLOOD PRESSURE - MUSE: NORMAL MMHG
T AXIS - MUSE: 55 DEGREES
TROPONIN T SERPL HS-MCNC: 16 NG/L
TSH SERPL DL<=0.005 MIU/L-ACNC: 3.01 UIU/ML (ref 0.3–4.2)
VENTRICULAR RATE- MUSE: 70 BPM
WBC # BLD AUTO: 6 10E3/UL (ref 4–11)

## 2023-12-12 PROCEDURE — 84484 ASSAY OF TROPONIN QUANT: CPT | Performed by: FAMILY MEDICINE

## 2023-12-12 PROCEDURE — 36415 COLL VENOUS BLD VENIPUNCTURE: CPT | Performed by: FAMILY MEDICINE

## 2023-12-12 PROCEDURE — 85025 COMPLETE CBC W/AUTO DIFF WBC: CPT | Performed by: FAMILY MEDICINE

## 2023-12-12 PROCEDURE — 84443 ASSAY THYROID STIM HORMONE: CPT | Performed by: FAMILY MEDICINE

## 2023-12-12 PROCEDURE — 80048 BASIC METABOLIC PNL TOTAL CA: CPT | Performed by: FAMILY MEDICINE

## 2023-12-12 PROCEDURE — 93010 ELECTROCARDIOGRAM REPORT: CPT | Performed by: INTERNAL MEDICINE

## 2023-12-12 ASSESSMENT — ACTIVITIES OF DAILY LIVING (ADL): ADLS_ACUITY_SCORE: 35

## 2023-12-12 NOTE — ED TRIAGE NOTES
"Pt c/o hypertension 200's/100's starting at 1400 on 12/10. Pt states he got a flushed feeling and had [facility] check his BP. BP (!) 192/98   Pulse 79   Temp 97.8  F (36.6  C) (Tympanic)   Resp 20   Ht 1.702 m (5' 7\")   Wt 66.2 kg (146 lb)   SpO2 97%   BMI 22.87 kg/m         Triage Assessment (Adult)       Row Name 12/11/23 2598          Triage Assessment    Airway WDL WDL        Respiratory WDL    Respiratory WDL WDL        Skin Circulation/Temperature WDL    Skin Circulation/Temperature WDL WDL        Cardiac WDL    Cardiac WDL WDL        Peripheral/Neurovascular WDL    Peripheral Neurovascular WDL WDL        Cognitive/Neuro/Behavioral WDL    Cognitive/Neuro/Behavioral WDL WDL                     "

## 2023-12-12 NOTE — ED PROVIDER NOTES
History     Chief Complaint   Patient presents with    Hypertension     The history is provided by the patient and medical records.     Alvaro Bass Jr. is a 88 year old male here with concerns about elevated blood pressure. He noticed on Sunday that he felt flushed which has typically been a sign that his BP is up. This morning his pressures were still elevated. Today at 2 PM it was 154/85 (best all day) but at other times it was 200/100 mmHg. No symptoms other than that flushed feeling yesterday.     No TSH check in years. He has heart disease including AV block, HTN, CAD, history of MI (has a pacemaker, on amlodipine, aspirin, atenolol, hydrochlorothiazide, losartan).    Allergies:  No Known Allergies    Problem List:    Patient Active Problem List    Diagnosis Date Noted    Seborrheic dermatitis of scalp 04/01/2019     Priority: Medium    Bladder outlet obstruction 01/19/2018     Priority: Medium     Overview:   Prostatism      Hypertension 01/19/2018     Priority: Medium    Bilateral carotid artery stenosis 01/19/2018     Priority: Medium     Overview:       Hyperlipidemia 09/21/2015     Priority: Medium    Diverticulosis of sigmoid colon 03/09/2015     Priority: Medium    History of colonic polyps 03/09/2015     Priority: Medium    Chronic kidney disease (CKD), stage III (moderate) (H) 08/21/2014     Priority: Medium    Lower urinary tract symptoms (LUTS) 08/21/2014     Priority: Medium    Coronary atherosclerosis 02/14/2012     Priority: Medium    Atrioventricular block, 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     Priority: Medium    Personal history of MI (myocardial infarction) 02/28/1999     Priority: Medium     Overview:   subendocardial          Past Medical History:    Past Medical History:   Diagnosis Date    Atherosclerotic heart disease of native coronary artery without angina pectoris     Atrioventricular block, complete (H)     Bladder neck obstruction     Chronic kidney disease, stage III (moderate) (H)      Diverticulosis of large intestine without perforation or abscess without bleeding     Essential (primary) hypertension     History of colonic polyps     Hyperlipidemia     Non-ST elevation (NSTEMI) myocardial infarction (H)     Occlusion and stenosis of unspecified carotid artery        Past Surgical History:    Past Surgical History:   Procedure Laterality Date    APPENDECTOMY OPEN      COLONOSCOPY      2010,Numerous polyps-all hyperplastic.  Next due .    COLONOSCOPY      3/9/15,F/U N/A age    CYSTOSCOPY      No Comments Provided    EXTRACAPSULAR CATARACT EXTRATION WITH INTRAOCULAR LENS IMPLANT Bilateral     HEART CATH, ANGIOPLASTY      ,right coronary artery    HERNIA REPAIR Right 2016    LAPAROSCOPIC INCARCERATED INGUINAL HERNIA REPAIR    IMPLANT PACEMAKER      2005,Dual chamber pacemaker, [employer], model:  [id number]    REPLACE PACEMAKER GENERATOR      2013       Family History:    Family History   Problem Relation Age of Onset    Other - See Comments Mother         Old age    Heart Disease Father     Other - See Comments Father         Stroke    Other - See Comments Other         severe COPD    Cancer Brother         Lung       Social History:  Marital Status:   [5]  Social History     Tobacco Use    Smoking status: Former     Types: Cigarettes     Quit date: 1999     Years since quittin.9    Smokeless tobacco: Never   Substance Use Topics    Alcohol use: No     Alcohol/week: 0.0 standard drinks of alcohol    Drug use: No     Comment: Drug use: No        Medications:    amLODIPine (NORVASC) 2.5 MG tablet  aspirin (ASA) 325 MG tablet  atenolol (TENORMIN) 50 MG tablet  fluocinonide (LIDEX) 0.05 % external ointment  hydrochlorothiazide (HYDRODIURIL) 25 MG tablet  losartan (COZAAR) 100 MG tablet  mometasone (ELOCON) 0.1 % external cream  psyllium (METAMUCIL/KONSYL) 0.52 g capsule  simvastatin (ZOCOR) 80 MG tablet  tamsulosin (FLOMAX) 0.4 MG capsule  triamcinolone (KENALOG) 0.1 %  "external ointment      Review of Systems   All other systems reviewed and are negative.      Physical Exam   BP: (!) 192/98  Pulse: 79  Temp: 97.8  F (36.6  C)  Resp: 20  Height: 170.2 cm (5' 7\")  Weight: 66.2 kg (146 lb)  SpO2: 97 %      Physical Exam  Vitals and nursing note reviewed.   Constitutional:       General: He is not in acute distress.     Appearance: Normal appearance. He is not ill-appearing, toxic-appearing or diaphoretic.   Cardiovascular:      Rate and Rhythm: Normal rate and regular rhythm.      Pulses: Normal pulses.      Heart sounds: Normal heart sounds.   Pulmonary:      Effort: Pulmonary effort is normal. No respiratory distress.      Breath sounds: Normal breath sounds.   Skin:     General: Skin is warm and dry.   Neurological:      General: No focal deficit present.      Mental Status: He is alert and oriented to person, place, and time.       EKG: shows dual paced rhythm, rate 70    Results for orders placed or performed during the hospital encounter of 12/11/23 (from the past 24 hour(s))   TSH Reflex GH   Result Value Ref Range    TSH 3.01 0.30 - 4.20 uIU/mL   CBC with platelets differential    Narrative    The following orders were created for panel order CBC with platelets differential.  Procedure                               Abnormality         Status                     ---------                               -----------         ------                     CBC with platelets and d...[[id number]]  Abnormal            Final result                 Please view results for these tests on the individual orders.   Basic metabolic panel   Result Value Ref Range    Sodium 131 (L) 135 - 145 mmol/L    Potassium 4.0 3.4 - 5.3 mmol/L    Chloride 97 (L) 98 - 107 mmol/L    Carbon Dioxide (CO2) 22 22 - 29 mmol/L    Anion Gap 12 7 - 15 mmol/L    Urea Nitrogen 26.2 (H) 8.0 - 23.0 mg/dL    Creatinine 1.49 (H) 0.67 - 1.17 mg/dL    GFR Estimate 45 (L) >60 mL/min/1.73m2    Calcium 9.5 8.8 - 10.2 mg/dL    " Glucose 116 (H) 70 - 99 mg/dL   Troponin T, High Sensitivity   Result Value Ref Range    Troponin T, High Sensitivity 16 <=22 ng/L   CBC with platelets and differential   Result Value Ref Range    WBC Count 6.0 4.0 - 11.0 10e3/uL    RBC Count 3.90 (L) 4.40 - 5.90 10e6/uL    Hemoglobin 12.6 (L) 13.3 - 17.7 g/dL    Hematocrit 35.6 (L) 40.0 - 53.0 %    MCV 91 78 - 100 fL    MCH 32.3 26.5 - 33.0 pg    MCHC 35.4 31.5 - 36.5 g/dL    RDW 12.9 10.0 - 15.0 %    Platelet Count 143 (L) 150 - 450 10e3/uL    % Neutrophils 63 %    % Lymphocytes 25 %    % Monocytes 10 %    % Eosinophils 2 %    % Basophils 0 %    % Immature Granulocytes 0 %    NRBCs per 100 WBC 0 <1 /100    Absolute Neutrophils 3.8 1.6 - 8.3 10e3/uL    Absolute Lymphocytes 1.5 0.8 - 5.3 10e3/uL    Absolute Monocytes 0.6 0.0 - 1.3 10e3/uL    Absolute Eosinophils 0.1 0.0 - 0.7 10e3/uL    Absolute Basophils 0.0 0.0 - 0.2 10e3/uL    Absolute Immature Granulocytes 0.0 <=0.4 10e3/uL    Absolute NRBCs 0.0 10e3/uL   Extra Tube    Narrative    The following orders were created for panel order Extra Tube.  Procedure                               Abnormality         Status                     ---------                               -----------         ------                     Extra Blue Top Tube[[id number]]                              Final result               Extra Red Top Tube[[id number]]                               Final result                 Please view results for these tests on the individual orders.   Extra Blue Top Tube   Result Value Ref Range    Hold Specimen OK    Extra Red Top Tube   Result Value Ref Range    Hold Specimen OK        Medications - No data to display    Assessments & Plan (with Medical Decision Making)  Alvaro SONI Graf Harrison is a 88 year old male here with concerns about elevated blood pressure. He noticed on Sunday that he felt flushed which has typically been a sign that his BP is up. This morning his pressures were still elevated. Today at 2 PM it  "was 154/85 (best all day) but at other times it was 200/100 mmHg. No symptoms other than that flushed feeling yesterday.   No TSH check in years. He has heart disease including AV block, HTN, CAD, history of MI (has a pacemaker, on amlodipine, aspirin, atenolol, hydrochlorothiazide, losartan).  VS in the ED   Patient Vitals for the past 24 hrs:   BP Temp Temp src Pulse Resp SpO2 Height Weight   12/12/23 0100 (!) 150/77 -- -- 60 -- -- -- --   12/12/23 0045 (!) 156/80 -- -- 62 -- -- -- --   12/12/23 0030 (!) 154/77 -- -- 61 -- 98 % -- --   12/12/23 0015 (!) 166/84 -- -- -- -- 99 % -- --   12/12/23 0000 (!) 179/96 -- -- 78 -- 95 % -- --   12/11/23 2351 (!) 192/98 97.8  F (36.6  C) Tympanic 79 20 97 % 1.702 m (5' 7\") 66.2 kg (146 lb)     Exam unremarkable.   EKG shows paced rhythm, rate 70  Labs show CBC with hgb 12.6, BMP with Na 131, Cr 1.49 (both stable) troponin 16, TSH 3.  We talked about checking blood pressure and talking to Dr Bhatti about this.      I have reviewed the nursing notes.    I have reviewed the findings, diagnosis, plan and need for follow up with the patient.  Medical Decision Making  The patient's presentation was of low complexity (a stable chronic illness).    The patient's evaluation involved:  an assessment requiring an independent historian (see separate area of note for details)  ordering and/or review of 3+ test(s) in this encounter (see separate area of note for details)    The patient's management necessitated only low risk treatment.    Final diagnoses:   Primary hypertension       12/11/2023   [facility], Rhys Bowers MD  12/12/23 0120    "

## 2023-12-12 NOTE — DISCHARGE INSTRUCTIONS
Alvaro     The blood pressure readings in the ED were much better:  154/77, 166/84, 179/96    I recommend that you check your blood pressure at home daily and bring those numbers to Dr Bhatti.       I recommend that you buy a good blood pressure cuff or use the blood pressure machine at a pharmacy.  Check your blood pressure a few times a week.  There are five components to an accurate blood pressure exam:    1. You should be seated comfortably in a chair  2. Remain seated for 5 minutes, preferably 10 minutes  3. Your arm should be at the level of your heart  4. You legs should be uncrossed with your feet flat on the floor  5. You should be using a reliable blood pressure machine    Sit down for a few minutes, push the button and write down your blood pressure reading. Sit a few more minutes and repeat that two more times, writing those numbers down as well.  You should do this one time a day.  Pick a time that works for you and do this the same time every day.   You should have three blood pressure readings written down for each day. Bring this information to your physician.     Thank you for choosing our Emergency Department for your care.     You may receive a phone call or letter for a survey about your care in our ED.  Please complete this as this is how we improve care for our patients.     If you have any questions after leaving the ED you can call or text me on my cell phone at [phone].  This does not mean that I am on call, but I will get back to you.  If you are not doing well please return to the ED.     Sincerely,    Dr Jarett Haney M.D.

## 2024-01-22 ENCOUNTER — TRANSFERRED RECORDS (OUTPATIENT)
Dept: HEALTH INFORMATION MANAGEMENT | Facility: OTHER | Age: 89
End: 2024-01-22

## 2024-02-09 SDOH — HEALTH STABILITY: PHYSICAL HEALTH: ON AVERAGE, HOW MANY MINUTES DO YOU ENGAGE IN EXERCISE AT THIS LEVEL?: 20 MIN

## 2024-02-09 SDOH — HEALTH STABILITY: PHYSICAL HEALTH: ON AVERAGE, HOW MANY DAYS PER WEEK DO YOU ENGAGE IN MODERATE TO STRENUOUS EXERCISE (LIKE A BRISK WALK)?: 1 DAY

## 2024-02-09 ASSESSMENT — SOCIAL DETERMINANTS OF HEALTH (SDOH): HOW OFTEN DO YOU GET TOGETHER WITH FRIENDS OR RELATIVES?: THREE TIMES A WEEK

## 2024-02-16 ENCOUNTER — OFFICE VISIT (OUTPATIENT)
Dept: INTERNAL MEDICINE | Facility: OTHER | Age: 89
End: 2024-02-16
Attending: STUDENT IN AN ORGANIZED HEALTH CARE EDUCATION/TRAINING PROGRAM
Payer: MEDICARE

## 2024-02-16 VITALS
OXYGEN SATURATION: 99 % | RESPIRATION RATE: 16 BRPM | BODY MASS INDEX: 23.89 KG/M2 | DIASTOLIC BLOOD PRESSURE: 82 MMHG | TEMPERATURE: 98.6 F | SYSTOLIC BLOOD PRESSURE: 136 MMHG | WEIGHT: 152.2 LBS | HEIGHT: 67 IN | HEART RATE: 84 BPM

## 2024-02-16 DIAGNOSIS — L21.9 SEBORRHEIC DERMATITIS OF SCALP: ICD-10-CM

## 2024-02-16 DIAGNOSIS — I10 ESSENTIAL HYPERTENSION: ICD-10-CM

## 2024-02-16 DIAGNOSIS — E78.5 HYPERLIPIDEMIA, UNSPECIFIED HYPERLIPIDEMIA TYPE: ICD-10-CM

## 2024-02-16 DIAGNOSIS — I25.2 PERSONAL HISTORY OF MI (MYOCARDIAL INFARCTION): ICD-10-CM

## 2024-02-16 DIAGNOSIS — Z00.00 MEDICARE ANNUAL WELLNESS VISIT, SUBSEQUENT: Primary | ICD-10-CM

## 2024-02-16 DIAGNOSIS — I44.2 ATRIOVENTRICULAR BLOCK, COMPLETE (H): ICD-10-CM

## 2024-02-16 DIAGNOSIS — I10 PRIMARY HYPERTENSION: ICD-10-CM

## 2024-02-16 DIAGNOSIS — I25.10 ATHEROSCLEROSIS OF NATIVE CORONARY ARTERY OF NATIVE HEART WITHOUT ANGINA PECTORIS: ICD-10-CM

## 2024-02-16 DIAGNOSIS — R39.9 LOWER URINARY TRACT SYMPTOMS (LUTS): ICD-10-CM

## 2024-02-16 DIAGNOSIS — N18.32 STAGE 3B CHRONIC KIDNEY DISEASE (H): ICD-10-CM

## 2024-02-16 PROCEDURE — G0439 PPPS, SUBSEQ VISIT: HCPCS | Performed by: STUDENT IN AN ORGANIZED HEALTH CARE EDUCATION/TRAINING PROGRAM

## 2024-02-16 PROCEDURE — G0463 HOSPITAL OUTPT CLINIC VISIT: HCPCS

## 2024-02-16 PROCEDURE — 99213 OFFICE O/P EST LOW 20 MIN: CPT | Mod: 25 | Performed by: STUDENT IN AN ORGANIZED HEALTH CARE EDUCATION/TRAINING PROGRAM

## 2024-02-16 RX ORDER — TRIAMCINOLONE ACETONIDE 1 MG/G
OINTMENT TOPICAL 3 TIMES DAILY
Qty: 80 G | Refills: 2 | Status: SHIPPED | OUTPATIENT
Start: 2024-02-16

## 2024-02-16 RX ORDER — LOSARTAN POTASSIUM 100 MG/1
100 TABLET ORAL DAILY
Qty: 90 TABLET | Refills: 3 | Status: CANCELLED | OUTPATIENT
Start: 2024-02-16

## 2024-02-16 RX ORDER — TAMSULOSIN HYDROCHLORIDE 0.4 MG/1
0.4 CAPSULE ORAL
Qty: 90 CAPSULE | Refills: 3 | Status: CANCELLED | OUTPATIENT
Start: 2024-02-16

## 2024-02-16 RX ORDER — SIMVASTATIN 80 MG
40 TABLET ORAL DAILY
Qty: 45 TABLET | Refills: 3 | Status: CANCELLED | OUTPATIENT
Start: 2024-02-16

## 2024-02-16 RX ORDER — ATENOLOL 50 MG/1
50 TABLET ORAL DAILY
Qty: 90 TABLET | Refills: 3 | Status: CANCELLED | OUTPATIENT
Start: 2024-02-16

## 2024-02-16 RX ORDER — AMLODIPINE BESYLATE 2.5 MG/1
2.5 TABLET ORAL DAILY
Qty: 90 TABLET | Refills: 3 | Status: CANCELLED | OUTPATIENT
Start: 2024-02-16

## 2024-02-16 RX ORDER — HYDROCHLOROTHIAZIDE 25 MG/1
25 TABLET ORAL DAILY
Qty: 90 TABLET | Refills: 3 | Status: CANCELLED | OUTPATIENT
Start: 2024-02-16

## 2024-02-16 ASSESSMENT — PAIN SCALES - GENERAL: PAINLEVEL: NO PAIN (0)

## 2024-02-16 NOTE — H&P (VIEW-ONLY)
Preventive Care Visit  [facility] AND [facility]  Zaire Bhatti MD, Internal Medicine  Feb 16, 2024    Assessment & Plan     Assessment & Plan         ICD-10-CM    1. Medicare annual wellness visit, subsequent  Z00.00       2. Essential hypertension  I10       3. Atherosclerosis of native coronary artery of native heart without angina pectoris  I25.10       4. Lower urinary tract symptoms (LUTS)  R39.9       5. Atrioventricular block, complete (H)  I44.2 Adult General Surg Referral     CANCELED: EKG 12-lead complete w/read - Clinics      6. Seborrheic dermatitis of scalp  L21.9 triamcinolone (KENALOG) 0.1 % external ointment      7. Hyperlipidemia, unspecified hyperlipidemia type  E78.5       8. Primary hypertension  I10       9. Stage 3b chronic kidney disease (H)  N18.32       10. Personal history of MI (myocardial infarction)  I25.2         Medicare wellness exam: Updated patient's past medical history, surgical history, social history, and medications.  Age appropriate laboratory results were ordered as above.  Patient is up-to-date on vaccines and has actually gotten 7 COVID shots.  Do not think that a 1/8 vaccine is indicated at this point.  He is graduated from prostate cancer screening as well as colonoscopy.  Labs were just drawn in December 2023 which were at his baseline/otherwise unremarkable.  Will draw lipids with next visit.      Complete heart block: Patient is due for a pacemaker battery pack replacement.  I discussed with general surgery and Dr. Alford will be able to perform this procedure.  Referral placed for consult.  Has about 3 months worth of battery on his most recent pacemaker interrogation.    Hypertension: Blood pressure 136/82 today.  Continue atenolol, amlodipine, losartan, hydrochlorothiazide    Hyperlipidemia: Recheck lipids next visit, continue simvastatin 0.5 mg daily.    CKD stage IIIb: On losartan 100 mg daily, baseline GFR approximately 45.    Personal history of MI:  Continue aspirin and simvastatin, should be on high intensity statin in the future.    Seborrheic dermatitis of the scalp: Given a prescription of triamcinolone to use on his scalp in addition to his current home regimen.      No follow-ups on file.    Zaire Bhatti MD  [facility]              Counseling  Appropriate preventive services were discussed with this patient, including applicable screening as appropriate for fall prevention, nutrition, physical activity, Tobacco-use cessation, weight loss and cognition.  Checklist reviewing preventive services available has been given to the patient.          No follow-ups on file.    Rolando John is a 88 year old, presenting for the following:  Medicare Visit and Establish Care        2/16/2024     1:25 PM   Additional Questions   Roomed by ARTURO Velarde   Accompanied by Self         Health Care Directive  Patient has a Health Care Directive on file  Advance care planning document is on file and is current.    History of Present Illness       Hyperlipidemia:  He presents for follow up of hyperlipidemia.   He is taking medication to lower cholesterol. He is not having myalgia or other side effects to statin medications.     Pacemaker     Noticeably weaker, shortness of breath.     [facility] not [facility] refills now.   Have     Elevated blood pressure and ended up in ED. No changes to blood pressure regimen. No further elevated blood pressures.         2/9/2024   General Health   How would you rate your overall physical health? Good   Feel stress (tense, anxious, or unable to sleep) Only a little   (!) STRESS CONCERN      2/9/2024   Nutrition   Diet: Regular (no restrictions)         2/9/2024   Exercise   Days per week of moderate/strenous exercise 1 day   Average minutes spent exercising at this level 20 min   (!) EXERCISE CONCERN      2/9/2024   Social Factors   Frequency of gathering with friends or  relatives Three times a week   Worry food won't last until get money to buy more No   Food not last or not have enough money for food? No   Do you have housing?  Yes   Are you worried about losing your housing? No   Lack of transportation? No   Unable to get utilities (heat,electricity)? No         2/9/2024   Fall Risk   Fallen 2 or more times in the past year? No    No   Trouble with walking or balance? No    No          2/9/2024   Activities of Daily Living- Home Safety   Needs help with the following daily activites None of the above   Safety concerns in the home None of the above         2/9/2024   Dental   Dentist two times every year? (!) NO         2/9/2024   Hearing Screening   Hearing concerns? (!) I FEEL THAT PEOPLE ARE MUMBLING OR NOT SPEAKING CLEARLY.    (!) I NEED TO ASK PEOPLE TO SPEAK UP OR REPEAT THEMSELVES.    (!) IT'S HARDER TO UNDERSTAND WOMEN'S VOICES THAN MEN'S VOICES.    (!) IT'S HARD TO FOLLOW A CONVERSATION IN A NOISY RESTAURANT OR CROWDED ROOM.    (!) TROUBLE UNDESTANDING A SPEAKER IN A PUBLIC MEETING OR Jew SERVICE.    (!) TROUBLE FOLLOWING DIALOGUE IN THE THEATHER.    (!) TROUBLE UNDERSTANDING SOFT OR WHISPERED SPEECH.    (!) TROUBLE UNDERSTANDING SPEECH ON THE TELEPHONE         2/9/2024   Driving Risk Screening   Patient/family members have concerns about driving No         2/9/2024   General Alertness/Fatigue Screening   Have you been more tired than usual lately? (!) YES         2/9/2024   Urinary Incontinence Screening   Bothered by leaking urine in past 6 months No         2/9/2024   TB Screening   Were you born outside of [place]?  No         Today's PHQ-2 Score:       2/16/2024     1:01 PM   PHQ-2 ( 1999 Pfizer)   Q1: Little interest or pleasure in doing things 0   Q2: Feeling down, depressed or hopeless 0   PHQ-2 Score 0   Q1: Little interest or pleasure in doing things Not at all   Q2: Feeling down, depressed or hopeless Not at all   PHQ-2 Score 0           2/9/2024   Substance  Use   Alcohol more than 3/day or more than 7/wk Not Applicable   Do you have a current opioid prescription? No   How severe/bad is pain from 1 to 10? 0/10 (No Pain)   Do you use any other substances recreationally? No     Social History     Tobacco Use    Smoking status: Former     Types: Cigarettes     Quit date: 1999     Years since quittin.1    Smokeless tobacco: Never   Vaping Use    Vaping Use: Never used   Substance Use Topics    Alcohol use: No     Alcohol/week: 0.0 standard drinks of alcohol    Drug use: No             Reviewed and updated as needed this visit by Provider        Surg Dustin             Past Medical History:   Diagnosis Date    Atherosclerotic heart disease of native coronary artery without angina pectoris     No Comments Provided    Atrioventricular block, complete (H)     No Comments Provided    Bladder neck obstruction     No Comments Provided    Chronic kidney disease, stage III (moderate) (H)     2014    Diverticulosis of large intestine without perforation or abscess without bleeding     3/9/2015    Essential (primary) hypertension     No Comments Provided    History of colonic polyps     ,not adenomatous    Hyperlipidemia     No Comments Provided    Non-ST elevation (NSTEMI) myocardial infarction (H)     1999,with favorable post myocardial infarction thallium studies.    Occlusion and stenosis of unspecified carotid artery     2012,Moderate <60% bilateral on US     Past Surgical History:   Procedure Laterality Date    APPENDECTOMY OPEN      COLONOSCOPY      2010,Numerous polyps-all hyperplastic.  Next due .    COLONOSCOPY      3/9/15,F/U N/A age    CYSTOSCOPY      No Comments Provided    EXTRACAPSULAR CATARACT EXTRATION WITH INTRAOCULAR LENS IMPLANT Bilateral     HEART CATH, ANGIOPLASTY      ,right coronary artery    HERNIA REPAIR Right 2016    LAPAROSCOPIC INCARCERATED INGUINAL HERNIA REPAIR    IMPLANT PACEMAKER      2005,Dual chamber pacemaker,  "Medtronic, model:  E2ER01    REPLACE PACEMAKER GENERATOR      8/21/2013     Lab work is in process  Labs reviewed in EPIC  Current providers sharing in care for this patient include:  Patient Care Team:  Zaire Bhatti MD as PCP - General (Internal Medicine)  Provider, MD Yadi as Assigned PCP    The following health maintenance items are reviewed in Epic and correct as of today:  Health Maintenance   Topic Date Due    COVID-19 Vaccine (8 - 2023-24 season) 11/20/2023    LIPID  02/15/2024    MEDICARE ANNUAL WELLNESS VISIT  02/15/2024    BMP  12/12/2024    FALL RISK ASSESSMENT  02/16/2025    DTAP/TDAP/TD IMMUNIZATION (3 - Td or Tdap) 11/10/2026    ADVANCE CARE PLANNING  02/16/2029    PHQ-2 (once per calendar year)  Completed    INFLUENZA VACCINE  Completed    Pneumococcal Vaccine: 65+ Years  Completed    ZOSTER IMMUNIZATION  Completed    RSV VACCINE (Pregnancy & 60+)  Completed    IPV IMMUNIZATION  Aged Out    HPV IMMUNIZATION  Aged Out    MENINGITIS IMMUNIZATION  Aged Out    RSV MONOCLONAL ANTIBODY  Aged Out    MICROALBUMIN  Discontinued    HEMOGLOBIN  Discontinued    URINALYSIS  Discontinued            Objective    Exam  /82   Pulse 84   Temp 98.6  F (37  C) (Temporal)   Resp 16   Ht 1.689 m (5' 6.5\")   Wt 69 kg (152 lb 3.2 oz)   SpO2 99%   BMI 24.20 kg/m     Estimated body mass index is 24.2 kg/m  as calculated from the following:    Height as of this encounter: 1.689 m (5' 6.5\").    Weight as of this encounter: 69 kg (152 lb 3.2 oz).    Physical Exam  Vitals and nursing note reviewed.   Constitutional:       General: He is not in acute distress.     Appearance: He is well-developed. He is not diaphoretic.   HENT:      Head: Normocephalic and atraumatic.      Right Ear: Tympanic membrane, ear canal and external ear normal.      Left Ear: Tympanic membrane, ear canal and external ear normal.      Mouth/Throat:      Mouth: Mucous membranes are moist.      Pharynx: Oropharynx is clear.   Eyes:      " General: No scleral icterus.     Conjunctiva/sclera: Conjunctivae normal.   Cardiovascular:      Rate and Rhythm: Normal rate and regular rhythm.      Heart sounds: No murmur heard.  Pulmonary:      Effort: Pulmonary effort is normal.      Breath sounds: Normal breath sounds. No wheezing.   Abdominal:      Palpations: Abdomen is soft. There is no mass.      Tenderness: There is no abdominal tenderness.   Musculoskeletal:         General: No deformity.      Cervical back: Neck supple.   Lymphadenopathy:      Cervical: No cervical adenopathy.   Skin:     General: Skin is warm and dry.      Coloration: Skin is not jaundiced.      Findings: No rash.   Neurological:      Mental Status: He is alert and oriented to person, place, and time. Mental status is at baseline.   Psychiatric:         Mood and Affect: Mood normal.         Behavior: Behavior normal.         Thought Content: Thought content normal.               2/16/2024   Mini Cog   Clock Draw Score 2 Normal   3 Item Recall 3 objects recalled   Mini Cog Total Score 5            Signed Electronically by: Zaire Bhatti MD

## 2024-02-16 NOTE — PROGRESS NOTES
Preventive Care Visit  [facility] AND [facility]  Zaire Bhatti MD, Internal Medicine  Feb 16, 2024    Assessment & Plan     Assessment & Plan         ICD-10-CM    1. Medicare annual wellness visit, subsequent  Z00.00       2. Essential hypertension  I10       3. Atherosclerosis of native coronary artery of native heart without angina pectoris  I25.10       4. Lower urinary tract symptoms (LUTS)  R39.9       5. Atrioventricular block, complete (H)  I44.2 Adult General Surg Referral     CANCELED: EKG 12-lead complete w/read - Clinics      6. Seborrheic dermatitis of scalp  L21.9 triamcinolone (KENALOG) 0.1 % external ointment      7. Hyperlipidemia, unspecified hyperlipidemia type  E78.5       8. Primary hypertension  I10       9. Stage 3b chronic kidney disease (H)  N18.32       10. Personal history of MI (myocardial infarction)  I25.2         Medicare wellness exam: Updated patient's past medical history, surgical history, social history, and medications.  Age appropriate laboratory results were ordered as above.  Patient is up-to-date on vaccines and has actually gotten 7 COVID shots.  Do not think that a 1/8 vaccine is indicated at this point.  He is graduated from prostate cancer screening as well as colonoscopy.  Labs were just drawn in December 2023 which were at his baseline/otherwise unremarkable.  Will draw lipids with next visit.      Complete heart block: Patient is due for a pacemaker battery pack replacement.  I discussed with general surgery and Dr. Alford will be able to perform this procedure.  Referral placed for consult.  Has about 3 months worth of battery on his most recent pacemaker interrogation.    Hypertension: Blood pressure 136/82 today.  Continue atenolol, amlodipine, losartan, hydrochlorothiazide    Hyperlipidemia: Recheck lipids next visit, continue simvastatin 0.5 mg daily.    CKD stage IIIb: On losartan 100 mg daily, baseline GFR approximately 45.    Personal history of MI:  Continue aspirin and simvastatin, should be on high intensity statin in the future.    Seborrheic dermatitis of the scalp: Given a prescription of triamcinolone to use on his scalp in addition to his current home regimen.      No follow-ups on file.    Zaire Bhatti MD  [facility]              Counseling  Appropriate preventive services were discussed with this patient, including applicable screening as appropriate for fall prevention, nutrition, physical activity, Tobacco-use cessation, weight loss and cognition.  Checklist reviewing preventive services available has been given to the patient.          No follow-ups on file.    Rolando John is a 88 year old, presenting for the following:  Medicare Visit and Establish Care        2/16/2024     1:25 PM   Additional Questions   Roomed by ARTURO Velarde   Accompanied by Self         Health Care Directive  Patient has a Health Care Directive on file  Advance care planning document is on file and is current.    History of Present Illness       Hyperlipidemia:  He presents for follow up of hyperlipidemia.   He is taking medication to lower cholesterol. He is not having myalgia or other side effects to statin medications.     Pacemaker     Noticeably weaker, shortness of breath.     Reviewed [facility] not [facility] refills now.   Have     Elevated blood pressure and ended up in ED. No changes to blood pressure regimen. No further elevated blood pressures.         2/9/2024   General Health   How would you rate your overall physical health? Good   Feel stress (tense, anxious, or unable to sleep) Only a little   (!) STRESS CONCERN      2/9/2024   Nutrition   Diet: Regular (no restrictions)         2/9/2024   Exercise   Days per week of moderate/strenous exercise 1 day   Average minutes spent exercising at this level 20 min   (!) EXERCISE CONCERN      2/9/2024   Social Factors   Frequency of gathering with friends or  relatives Three times a week   Worry food won't last until get money to buy more No   Food not last or not have enough money for food? No   Do you have housing?  Yes   Are you worried about losing your housing? No   Lack of transportation? No   Unable to get utilities (heat,electricity)? No         2/9/2024   Fall Risk   Fallen 2 or more times in the past year? No    No   Trouble with walking or balance? No    No          2/9/2024   Activities of Daily Living- Home Safety   Needs help with the following daily activites None of the above   Safety concerns in the home None of the above         2/9/2024   Dental   Dentist two times every year? (!) NO         2/9/2024   Hearing Screening   Hearing concerns? (!) I FEEL THAT PEOPLE ARE MUMBLING OR NOT SPEAKING CLEARLY.    (!) I NEED TO ASK PEOPLE TO SPEAK UP OR REPEAT THEMSELVES.    (!) IT'S HARDER TO UNDERSTAND WOMEN'S VOICES THAN MEN'S VOICES.    (!) IT'S HARD TO FOLLOW A CONVERSATION IN A NOISY RESTAURANT OR CROWDED ROOM.    (!) TROUBLE UNDESTANDING A SPEAKER IN A PUBLIC MEETING OR Jain SERVICE.    (!) TROUBLE FOLLOWING DIALOGUE IN THE THEATHER.    (!) TROUBLE UNDERSTANDING SOFT OR WHISPERED SPEECH.    (!) TROUBLE UNDERSTANDING SPEECH ON THE TELEPHONE         2/9/2024   Driving Risk Screening   Patient/family members have concerns about driving No         2/9/2024   General Alertness/Fatigue Screening   Have you been more tired than usual lately? (!) YES         2/9/2024   Urinary Incontinence Screening   Bothered by leaking urine in past 6 months No         2/9/2024   TB Screening   Were you born outside of [place]?  No         Today's PHQ-2 Score:       2/16/2024     1:01 PM   PHQ-2 ( 1999 Pfizer)   Q1: Little interest or pleasure in doing things 0   Q2: Feeling down, depressed or hopeless 0   PHQ-2 Score 0   Q1: Little interest or pleasure in doing things Not at all   Q2: Feeling down, depressed or hopeless Not at all   PHQ-2 Score 0           2/9/2024   Substance  Use   Alcohol more than 3/day or more than 7/wk Not Applicable   Do you have a current opioid prescription? No   How severe/bad is pain from 1 to 10? 0/10 (No Pain)   Do you use any other substances recreationally? No     Social History     Tobacco Use    Smoking status: Former     Types: Cigarettes     Quit date: 1999     Years since quittin.1    Smokeless tobacco: Never   Vaping Use    Vaping Use: Never used   Substance Use Topics    Alcohol use: No     Alcohol/week: 0.0 standard drinks of alcohol    Drug use: No             Reviewed and updated as needed this visit by Provider        Surg Dustin             Past Medical History:   Diagnosis Date    Atherosclerotic heart disease of native coronary artery without angina pectoris     No Comments Provided    Atrioventricular block, complete (H)     No Comments Provided    Bladder neck obstruction     No Comments Provided    Chronic kidney disease, stage III (moderate) (H)     2014    Diverticulosis of large intestine without perforation or abscess without bleeding     3/9/2015    Essential (primary) hypertension     No Comments Provided    History of colonic polyps     ,not adenomatous    Hyperlipidemia     No Comments Provided    Non-ST elevation (NSTEMI) myocardial infarction (H)     1999,with favorable post myocardial infarction thallium studies.    Occlusion and stenosis of unspecified carotid artery     2012,Moderate <60% bilateral on US     Past Surgical History:   Procedure Laterality Date    APPENDECTOMY OPEN      COLONOSCOPY      2010,Numerous polyps-all hyperplastic.  Next due .    COLONOSCOPY      3/9/15,F/U N/A age    CYSTOSCOPY      No Comments Provided    EXTRACAPSULAR CATARACT EXTRATION WITH INTRAOCULAR LENS IMPLANT Bilateral     HEART CATH, ANGIOPLASTY      ,right coronary artery    HERNIA REPAIR Right 2016    LAPAROSCOPIC INCARCERATED INGUINAL HERNIA REPAIR    IMPLANT PACEMAKER      2005,Dual chamber pacemaker,  "Medtronic, model:  E2ER01    REPLACE PACEMAKER GENERATOR      8/21/2013     Lab work is in process  Labs reviewed in EPIC  Current providers sharing in care for this patient include:  Patient Care Team:  Zaire Bhatti MD as PCP - General (Internal Medicine)  Provider, MD Yadi as Assigned PCP    The following health maintenance items are reviewed in Epic and correct as of today:  Health Maintenance   Topic Date Due    COVID-19 Vaccine (8 - 2023-24 season) 11/20/2023    LIPID  02/15/2024    MEDICARE ANNUAL WELLNESS VISIT  02/15/2024    BMP  12/12/2024    FALL RISK ASSESSMENT  02/16/2025    DTAP/TDAP/TD IMMUNIZATION (3 - Td or Tdap) 11/10/2026    ADVANCE CARE PLANNING  02/16/2029    PHQ-2 (once per calendar year)  Completed    INFLUENZA VACCINE  Completed    Pneumococcal Vaccine: 65+ Years  Completed    ZOSTER IMMUNIZATION  Completed    RSV VACCINE (Pregnancy & 60+)  Completed    IPV IMMUNIZATION  Aged Out    HPV IMMUNIZATION  Aged Out    MENINGITIS IMMUNIZATION  Aged Out    RSV MONOCLONAL ANTIBODY  Aged Out    MICROALBUMIN  Discontinued    HEMOGLOBIN  Discontinued    URINALYSIS  Discontinued            Objective    Exam  /82   Pulse 84   Temp 98.6  F (37  C) (Temporal)   Resp 16   Ht 1.689 m (5' 6.5\")   Wt 69 kg (152 lb 3.2 oz)   SpO2 99%   BMI 24.20 kg/m     Estimated body mass index is 24.2 kg/m  as calculated from the following:    Height as of this encounter: 1.689 m (5' 6.5\").    Weight as of this encounter: 69 kg (152 lb 3.2 oz).    Physical Exam  Vitals and nursing note reviewed.   Constitutional:       General: He is not in acute distress.     Appearance: He is well-developed. He is not diaphoretic.   HENT:      Head: Normocephalic and atraumatic.      Right Ear: Tympanic membrane, ear canal and external ear normal.      Left Ear: Tympanic membrane, ear canal and external ear normal.      Mouth/Throat:      Mouth: Mucous membranes are moist.      Pharynx: Oropharynx is clear.   Eyes:      " General: No scleral icterus.     Conjunctiva/sclera: Conjunctivae normal.   Cardiovascular:      Rate and Rhythm: Normal rate and regular rhythm.      Heart sounds: No murmur heard.  Pulmonary:      Effort: Pulmonary effort is normal.      Breath sounds: Normal breath sounds. No wheezing.   Abdominal:      Palpations: Abdomen is soft. There is no mass.      Tenderness: There is no abdominal tenderness.   Musculoskeletal:         General: No deformity.      Cervical back: Neck supple.   Lymphadenopathy:      Cervical: No cervical adenopathy.   Skin:     General: Skin is warm and dry.      Coloration: Skin is not jaundiced.      Findings: No rash.   Neurological:      Mental Status: He is alert and oriented to person, place, and time. Mental status is at baseline.   Psychiatric:         Mood and Affect: Mood normal.         Behavior: Behavior normal.         Thought Content: Thought content normal.               2/16/2024   Mini Cog   Clock Draw Score 2 Normal   3 Item Recall 3 objects recalled   Mini Cog Total Score 5            Signed Electronically by: Zaire Bhatti MD

## 2024-02-19 ENCOUNTER — TELEPHONE (OUTPATIENT)
Dept: SURGERY | Facility: OTHER | Age: 89
End: 2024-02-19
Payer: MEDICARE

## 2024-02-19 NOTE — TELEPHONE ENCOUNTER
Patient was scheduled for Pacemaker battery replacement on 24 with Dr. Alford.    Patient was called and educated.  Folder with information and soap was put at unit four  and patient stated he will .   Called Robinson at [phone]   left Patient name, ,  date of Surgery and Dual chamber pacemaker, Medtronic  Model: [id number].       Patient was also informed we did not need to see him on 24.   Patient did not feel he needed the appointment for any reason either.  Appointment was cancelled.    Renu Carranza LPN .......  2024  4:39 PM

## 2024-02-21 NOTE — TELEPHONE ENCOUNTER
Patient Dropped off paper work from his pacemaker evaluation report from 1/22/2024. This information was not visible in chart at the time of notations. Doron Harrell RN noted:      Pacemaker Model: Adapta [id number] Implanted 8/21/2013    Atrial lead: Medtronic: Model [id number] - Implanted 1/11/2005  RV lead: Medtronic: Model [id number] - Implanted 1/11/2005    Followed up with Robinson at [facility] and he did have the correct information for this pacemaker at hand. Mamta Pennington RN  ....................  2/21/2024   2:25 PM

## 2024-02-23 ENCOUNTER — ANESTHESIA EVENT (OUTPATIENT)
Dept: SURGERY | Facility: OTHER | Age: 89
End: 2024-02-23
Payer: MEDICARE

## 2024-02-26 ENCOUNTER — HOSPITAL ENCOUNTER (OUTPATIENT)
Facility: OTHER | Age: 89
Discharge: HOME | End: 2024-02-26
Attending: SURGERY | Admitting: SURGERY
Payer: MEDICARE

## 2024-02-26 ENCOUNTER — ANESTHESIA (OUTPATIENT)
Dept: SURGERY | Facility: OTHER | Age: 89
End: 2024-02-26
Payer: MEDICARE

## 2024-02-26 VITALS
RESPIRATION RATE: 16 BRPM | SYSTOLIC BLOOD PRESSURE: 147 MMHG | BODY MASS INDEX: 23.86 KG/M2 | WEIGHT: 152 LBS | HEART RATE: 61 BPM | OXYGEN SATURATION: 96 % | HEIGHT: 67 IN | TEMPERATURE: 97.4 F | DIASTOLIC BLOOD PRESSURE: 68 MMHG

## 2024-02-26 PROCEDURE — C1785 PMKR, DUAL, RATE-RESP: HCPCS | Performed by: SURGERY

## 2024-02-26 PROCEDURE — 250N000011 HC RX IP 250 OP 636: Performed by: SURGERY

## 2024-02-26 PROCEDURE — 710N000012 HC RECOVERY PHASE 2, PER MINUTE: Performed by: SURGERY

## 2024-02-26 PROCEDURE — 360N000076 HC SURGERY LEVEL 3, PER MIN: Performed by: SURGERY

## 2024-02-26 PROCEDURE — 999N000141 HC STATISTIC PRE-PROCEDURE NURSING ASSESSMENT: Performed by: SURGERY

## 2024-02-26 PROCEDURE — 33228 REMV&REPLC PM GEN DUAL LEAD: CPT | Performed by: NURSE ANESTHETIST, CERTIFIED REGISTERED

## 2024-02-26 PROCEDURE — 250N000011 HC RX IP 250 OP 636: Performed by: NURSE ANESTHETIST, CERTIFIED REGISTERED

## 2024-02-26 PROCEDURE — 250N000009 HC RX 250: Performed by: NURSE ANESTHETIST, CERTIFIED REGISTERED

## 2024-02-26 PROCEDURE — 272N000001 HC OR GENERAL SUPPLY STERILE: Performed by: SURGERY

## 2024-02-26 PROCEDURE — 370N000017 HC ANESTHESIA TECHNICAL FEE, PER MIN: Performed by: SURGERY

## 2024-02-26 PROCEDURE — 33228 REMV&REPLC PM GEN DUAL LEAD: CPT | Performed by: SURGERY

## 2024-02-26 PROCEDURE — 258N000003 HC RX IP 258 OP 636: Performed by: NURSE ANESTHETIST, CERTIFIED REGISTERED

## 2024-02-26 PROCEDURE — 250N000009 HC RX 250: Performed by: SURGERY

## 2024-02-26 PROCEDURE — 99100 ANES PT EXTEME AGE<1 YR&>70: CPT | Performed by: NURSE ANESTHETIST, CERTIFIED REGISTERED

## 2024-02-26 DEVICE — IPG W1DR01 AZURE XT DR MRI USA
Type: IMPLANTABLE DEVICE | Status: FUNCTIONAL
Brand: AZURE™ XT DR MRI SURESCAN™

## 2024-02-26 RX ORDER — OXYCODONE HYDROCHLORIDE 5 MG/1
5 TABLET ORAL
Status: DISCONTINUED | OUTPATIENT
Start: 2024-02-26 | End: 2024-02-26 | Stop reason: HOSPADM

## 2024-02-26 RX ORDER — OXYCODONE HYDROCHLORIDE 5 MG/1
10 TABLET ORAL
Status: DISCONTINUED | OUTPATIENT
Start: 2024-02-26 | End: 2024-02-26 | Stop reason: HOSPADM

## 2024-02-26 RX ORDER — ONDANSETRON 2 MG/ML
4 INJECTION INTRAMUSCULAR; INTRAVENOUS EVERY 30 MIN PRN
Status: DISCONTINUED | OUTPATIENT
Start: 2024-02-26 | End: 2024-02-26 | Stop reason: HOSPADM

## 2024-02-26 RX ORDER — HYDROMORPHONE HCL IN WATER/PF 6 MG/30 ML
0.4 PATIENT CONTROLLED ANALGESIA SYRINGE INTRAVENOUS EVERY 5 MIN PRN
Status: DISCONTINUED | OUTPATIENT
Start: 2024-02-26 | End: 2024-02-26 | Stop reason: HOSPADM

## 2024-02-26 RX ORDER — ONDANSETRON 4 MG/1
4 TABLET, ORALLY DISINTEGRATING ORAL EVERY 30 MIN PRN
Status: DISCONTINUED | OUTPATIENT
Start: 2024-02-26 | End: 2024-02-26 | Stop reason: HOSPADM

## 2024-02-26 RX ORDER — FENTANYL CITRATE 50 UG/ML
25 INJECTION, SOLUTION INTRAMUSCULAR; INTRAVENOUS EVERY 5 MIN PRN
Status: DISCONTINUED | OUTPATIENT
Start: 2024-02-26 | End: 2024-02-26 | Stop reason: HOSPADM

## 2024-02-26 RX ORDER — FENTANYL CITRATE 50 UG/ML
50 INJECTION, SOLUTION INTRAMUSCULAR; INTRAVENOUS EVERY 5 MIN PRN
Status: DISCONTINUED | OUTPATIENT
Start: 2024-02-26 | End: 2024-02-26 | Stop reason: HOSPADM

## 2024-02-26 RX ORDER — NALOXONE HYDROCHLORIDE 0.4 MG/ML
0.1 INJECTION, SOLUTION INTRAMUSCULAR; INTRAVENOUS; SUBCUTANEOUS
Status: DISCONTINUED | OUTPATIENT
Start: 2024-02-26 | End: 2024-02-26 | Stop reason: HOSPADM

## 2024-02-26 RX ORDER — LIDOCAINE 40 MG/G
CREAM TOPICAL
Status: DISCONTINUED | OUTPATIENT
Start: 2024-02-26 | End: 2024-02-26 | Stop reason: HOSPADM

## 2024-02-26 RX ORDER — CEFAZOLIN SODIUM 1 G/50ML
INJECTION, SOLUTION INTRAVENOUS PRN
Status: DISCONTINUED | OUTPATIENT
Start: 2024-02-26 | End: 2024-02-26

## 2024-02-26 RX ORDER — PROPOFOL 10 MG/ML
INJECTION, EMULSION INTRAVENOUS CONTINUOUS PRN
Status: DISCONTINUED | OUTPATIENT
Start: 2024-02-26 | End: 2024-02-26

## 2024-02-26 RX ORDER — IBUPROFEN 200 MG
600 TABLET ORAL
Status: DISCONTINUED | OUTPATIENT
Start: 2024-02-26 | End: 2024-02-26 | Stop reason: HOSPADM

## 2024-02-26 RX ORDER — SODIUM CHLORIDE, SODIUM LACTATE, POTASSIUM CHLORIDE, CALCIUM CHLORIDE 600; 310; 30; 20 MG/100ML; MG/100ML; MG/100ML; MG/100ML
INJECTION, SOLUTION INTRAVENOUS CONTINUOUS
Status: DISCONTINUED | OUTPATIENT
Start: 2024-02-26 | End: 2024-02-26 | Stop reason: HOSPADM

## 2024-02-26 RX ORDER — LIDOCAINE HYDROCHLORIDE 20 MG/ML
INJECTION, SOLUTION INFILTRATION; PERINEURAL PRN
Status: DISCONTINUED | OUTPATIENT
Start: 2024-02-26 | End: 2024-02-26

## 2024-02-26 RX ORDER — HYDROMORPHONE HCL IN WATER/PF 6 MG/30 ML
0.2 PATIENT CONTROLLED ANALGESIA SYRINGE INTRAVENOUS EVERY 5 MIN PRN
Status: DISCONTINUED | OUTPATIENT
Start: 2024-02-26 | End: 2024-02-26 | Stop reason: HOSPADM

## 2024-02-26 RX ORDER — ONDANSETRON 2 MG/ML
INJECTION INTRAMUSCULAR; INTRAVENOUS PRN
Status: DISCONTINUED | OUTPATIENT
Start: 2024-02-26 | End: 2024-02-26

## 2024-02-26 RX ADMIN — PROPOFOL 75 MCG/KG/MIN: 10 INJECTION, EMULSION INTRAVENOUS at 13:42

## 2024-02-26 RX ADMIN — SODIUM CHLORIDE, SODIUM LACTATE, POTASSIUM CHLORIDE, AND CALCIUM CHLORIDE: 600; 310; 30; 20 INJECTION, SOLUTION INTRAVENOUS at 10:56

## 2024-02-26 RX ADMIN — LIDOCAINE HYDROCHLORIDE 40 MG: 20 INJECTION, SOLUTION INFILTRATION; PERINEURAL at 13:42

## 2024-02-26 RX ADMIN — SODIUM CHLORIDE, SODIUM LACTATE, POTASSIUM CHLORIDE, AND CALCIUM CHLORIDE: 600; 310; 30; 20 INJECTION, SOLUTION INTRAVENOUS at 13:44

## 2024-02-26 RX ADMIN — ONDANSETRON HYDROCHLORIDE 4 MG: 2 SOLUTION INTRAMUSCULAR; INTRAVENOUS at 13:39

## 2024-02-26 RX ADMIN — CEFAZOLIN SODIUM 2 G: 1 INJECTION, SOLUTION INTRAVENOUS at 13:52

## 2024-02-26 ASSESSMENT — ACTIVITIES OF DAILY LIVING (ADL)
ADLS_ACUITY_SCORE: 33

## 2024-02-26 ASSESSMENT — LIFESTYLE VARIABLES: TOBACCO_USE: 1

## 2024-02-26 NOTE — OP NOTE
Surgeon: Senthil Alford MD   Circulator: Debora Sanchez RN  Relief Circulator: Allan Jimenez RN  Scrub Person: Neema Nolen Patient Positioning: Cheli Alford RN    Family Phys.: Zaire Bhatti     PREOP DIAGNOSIS: Pacemaker battery depletion.     POSTOPDIAGNOSIS: Same.     PROCEDURE PERFORMED: Replacement of pacemaker generator. Number of leads: 2    ANESTHESIA: MAC with local.     SPECIMENS: None.     BLOOD LOSS: Minimal.     COMPLICATIONS:None immediately apparent.     INDICATIONS: The patient is a 88 year old male with a previous pacemaker showing endo of life. The pacemaker battery is showing depletion. The risks, benefits and alternatives to replacement of pacemakergenerator were discussed with the patient. We specifically discussed the risks of infection, bleeding, hematoma, fluid collections and the possible need for further pacemaker generator replacements as well as the possibilityof lead damage to the leads requiring lead replacement. The patient expressed understanding. Informed consent paperwork was completed.     PROCEDURE: The patient was taken to the operating room. Appropriate monitors  were attached. The patient received IV antibiotics preoperatively. The patient was placed in the supine position on the operating room table. The patient had preoperative pacemaker interrogation showing end of use. The patientreceived sedation for comfort. The patient's chest and neck were prepped and draped in standard fashion. Time out was performed confirming the patient s identity and procedure to be performed.  The Right chest wall pacemaker was easily palpable and the scar from placement was easily visible. The skin and subcutaneous tissue and thearea of the scar were infiltrated with local anesthetic. The incision was then opened sharply and carried down to the subcutaneous tissue. No electrocautery was used on the capsule or leads. The pacemaker generator was easily palpated and the  leads were palpated and avoided. The capsule around the generator was entered. The pacemaker was freed from any adhesions. The pacemaker was ex-planted without difficulty. The atrial lead was disconnected from thegenerator and secured in the new generator. Numbers were appropriate. The right ventricular lead was then detached from the generator and secured in the new generator. The generator was then positioned in the pocket. This was inspected and found to have excellent hemostasis. Deep tissues were reapproximated using running and then interrupted  Vicryl sutures. Skin edges were reapproximated using running Monocryl in a subcuticular layer. Glue was applied. Sterile dressing was applied.   Interrogation of the pacemaker was performed.Please see the pacemaker implant   paperwork for further information.    The patient tolerated the procedure with no immediately apparent complications. All needle, sponge and instrument counts were reported as   correct at the completion of the case.     Senthil Alford MD on 2/26/2024 at 2:33 PM     Zaire Bhatti

## 2024-02-26 NOTE — ANESTHESIA CARE TRANSFER NOTE
Patient: Alvaro Bass Jr.    Procedure: Procedure(s):  Replace pacemaker generator       Diagnosis: Cardiac pacemaker battery worn out [Z45.010]  Diagnosis Additional Information: No value filed.    Anesthesia Type:   MAC     Note:    Oropharynx: oropharynx clear of all foreign objects  Level of Consciousness: awake  Oxygen Supplementation: room air    Independent Airway: airway patency satisfactory and stable    Vital Signs Stable: post-procedure vital signs reviewed and stable  Report to RN Given: handoff report given  Patient transferred to: Phase II    Handoff Report: Identifed the Patient, Identified the Reponsible Provider, Reviewed the pertinent medical history, Discussed the surgical course, Reviewed Intra-OP anesthesia mangement and issues during anesthesia, Set expectations for post-procedure period and Allowed opportunity for questions and acknowledgement of understanding      Vitals:  Vitals Value Taken Time   BP     Temp     Pulse     Resp     SpO2         Electronically Signed By: SHYAM FERNANDEZ CRNA  February 26, 2024  2:19 PM

## 2024-02-26 NOTE — DISCHARGE INSTRUCTIONS
[facility]  Adult Discharge Orders & Instructions      For 24 hours after surgery:  Get plenty of rest.  A responsible adult must stay with you for at least 24 hours after you leave the hospital.   You may feel lightheaded.  IF so, sit for a few minutes before standing.  Have someone help you get up.   You may have a slight fever. Call the doctor if your fever is over 101 F (38.3 C) (taken under the tongue) or lasts longer than 24 hours.  You may have a dry mouth, a sore throat, muscle aches or trouble sleeping.  These should go away after 24 hours.  Do not make important or legal decisions.  6.   Do not drive or use heavy equipment.  If you have weakness or tingling, don't drive or use heavy equipment until this feeling goes away.                                                                                                                                                                           To contact a doctor, call    [phone]

## 2024-02-26 NOTE — ANESTHESIA PREPROCEDURE EVALUATION
Anesthesia Pre-Procedure Evaluation    Patient: Alvaro Bass Jr.   MRN: [id number] : 1935        Procedure : Procedure(s):  Replace pacemaker generator          Past Medical History:   Diagnosis Date    Atherosclerotic heart disease of native coronary artery without angina pectoris     No Comments Provided    Atrioventricular block, complete (H)     No Comments Provided    Bladder neck obstruction     No Comments Provided    Chronic kidney disease, stage III (moderate) (H)     2014    Diverticulosis of large intestine without perforation or abscess without bleeding     3/9/2015    Essential (primary) hypertension     No Comments Provided    History of colonic polyps     ,not adenomatous    Hyperlipidemia     No Comments Provided    Non-ST elevation (NSTEMI) myocardial infarction (H)     1999,with favorable post myocardial infarction thallium studies.    Occlusion and stenosis of unspecified carotid artery     2012,Moderate <60% bilateral on US      Past Surgical History:   Procedure Laterality Date    APPENDECTOMY OPEN      COLONOSCOPY      2010,Numerous polyps-all hyperplastic.  Next due .    COLONOSCOPY      3/9/15,F/U N/A age    CYSTOSCOPY      No Comments Provided    EXTRACAPSULAR CATARACT EXTRATION WITH INTRAOCULAR LENS IMPLANT Bilateral     HEART CATH, ANGIOPLASTY      ,right coronary artery    HERNIA REPAIR Right 2016    LAPAROSCOPIC INCARCERATED INGUINAL HERNIA REPAIR    IMPLANT PACEMAKER      2005,Dual chamber pacemaker, [employer], model:  E2ER01    REPLACE PACEMAKER GENERATOR      2013      No Known Allergies   Social History     Tobacco Use    Smoking status: Former     Types: Cigarettes     Quit date: 1999     Years since quittin.1    Smokeless tobacco: Never   Substance Use Topics    Alcohol use: No     Alcohol/week: 0.0 standard drinks of alcohol      Wt Readings from Last 1 Encounters:   24 69 kg (152 lb 3.2 oz)        Anesthesia Evaluation    "Pt has had prior anesthetic.     No history of anesthetic complications       ROS/MED HX  ENT/Pulmonary:     (+)                tobacco use, Past use,                       Neurologic:  - neg neurologic ROS     Cardiovascular:     (+) Dyslipidemia hypertension- Peripheral Vascular Disease-- Carotid Stenosis.  CAD -  - -              pacemaker,                        METS/Exercise Tolerance: 3 - Able to walk 1-2 blocks without stopping    Hematologic:  - neg hematologic  ROS     Musculoskeletal:  - neg musculoskeletal ROS     GI/Hepatic:  - neg GI/hepatic ROS     Renal/Genitourinary:     (+) renal disease, type: CRI,            Endo:  - neg endo ROS     Psychiatric/Substance Use:  - neg psychiatric ROS     Infectious Disease:  - neg infectious disease ROS     Malignancy:  - neg malignancy ROS     Other:  - neg other ROS          Physical Exam    Airway        Mallampati: II   TM distance: > 3 FB   Neck ROM: full   Mouth opening: > 3 cm    Respiratory Devices and Support         Dental       (+) Edentulous      Cardiovascular   cardiovascular exam normal       Rhythm and rate: regular and normal     Pulmonary   pulmonary exam normal        breath sounds clear to auscultation           OUTSIDE LABS:  CBC:   Lab Results   Component Value Date    WBC 6.0 12/12/2023    WBC 6.5 07/31/2023    HGB 12.6 (L) 12/12/2023    HGB 12.2 (L) 07/31/2023    HCT 35.6 (L) 12/12/2023    HCT 35.5 (L) 07/31/2023     (L) 12/12/2023     07/31/2023     BMP:   Lab Results   Component Value Date     (L) 12/12/2023     (L) 07/31/2023    POTASSIUM 4.0 12/12/2023    POTASSIUM 4.1 07/31/2023    CHLORIDE 97 (L) 12/12/2023    CHLORIDE 98 07/31/2023    CO2 22 12/12/2023    CO2 26 07/31/2023    BUN 26.2 (H) 12/12/2023    BUN 24.9 (H) 07/31/2023    CR 1.49 (H) 12/12/2023    CR 1.45 (H) 07/31/2023     (H) 12/12/2023    GLC 79 07/31/2023     COAGS: No results found for: \"PTT\", \"INR\", \"FIBR\"  POC: No results found for: " "\"BGM\", \"HCG\", \"HCGS\"  HEPATIC:   Lab Results   Component Value Date    ALBUMIN 4.3 07/31/2023    PROTTOTAL 6.6 07/31/2023    ALT 14 07/31/2023    AST 22 07/31/2023    ALKPHOS 66 07/31/2023    BILITOTAL 0.7 07/31/2023     OTHER:   Lab Results   Component Value Date    HEATHER 9.5 12/12/2023    PHOS 3.1 08/30/2016    MAG 1.3 (L) 08/30/2016    TSH 3.01 12/12/2023       Anesthesia Plan    ASA Status:  3    NPO Status:  NPO Appropriate    Anesthesia Type: MAC.   Induction: Propofol.   Maintenance: Balanced.        Consents    Anesthesia Plan(s) and associated risks, benefits, and realistic alternatives discussed. Questions answered and patient/representative(s) expressed understanding.     - Discussed: Risks, Benefits and Alternatives for BOTH SEDATION and the PROCEDURE were discussed     - Discussed with:  Patient      - Extended Intubation/Ventilatory Support Discussed: No.      - Patient is DNR/DNI Status: No     Use of blood products discussed: No .     Postoperative Care    Pain management: IV analgesics, Multi-modal analgesia.   PONV prophylaxis: Ondansetron (or other 5HT-3)     Comments:               SHYAM MONTERROSO CRNA    I have reviewed the pertinent notes and labs in the chart from the past 30 days and (re)examined the patient.  Any updates or changes from those notes are reflected in this note.             # Drug Induced Platelet Defect: home medication list includes an antiplatelet medication      "

## 2024-02-26 NOTE — ANESTHESIA POSTPROCEDURE EVALUATION
Patient: Alvaro Bass Jr.    Procedure: Procedure(s):  Replace pacemaker generator       Anesthesia Type:  MAC    Note:  Disposition: Outpatient   Postop Pain Control: Uneventful            Sign Out: Well controlled pain   PONV: No   Neuro/Psych: Uneventful            Sign Out: Acceptable/Baseline neuro status   Airway/Respiratory: Uneventful            Sign Out: Acceptable/Baseline resp. status   CV/Hemodynamics: Uneventful            Sign Out: Acceptable CV status; No obvious hypovolemia; No obvious fluid overload   Other NRE: NONE   DID A NON-ROUTINE EVENT OCCUR?            Last vitals:  Vitals Value Taken Time   /74 02/26/24 1445   Temp 97.4  F (36.3  C) 02/26/24 1445   Pulse 61 02/26/24 1445   Resp     SpO2 94 % 02/26/24 1451   Vitals shown include unfiled device data.    Electronically Signed By: SHYAM FERNANDEZ CRNA  February 26, 2024  2:53 PM

## 2024-03-06 ENCOUNTER — MYC MEDICAL ADVICE (OUTPATIENT)
Dept: INTERNAL MEDICINE | Facility: OTHER | Age: 89
End: 2024-03-06
Payer: MEDICARE

## 2024-03-06 DIAGNOSIS — I65.23 BILATERAL CAROTID ARTERY STENOSIS: Primary | ICD-10-CM

## 2024-03-06 NOTE — TELEPHONE ENCOUNTER
2/16/2024 OV with [facility] for Medicare Annual Wellness Visit.     Gisele Sheikh RN on 3/6/2024 at 2:57 PM

## 2024-03-06 NOTE — TELEPHONE ENCOUNTER
It looks like patient has been following with vascular specialist through [facility]- I would recommend having them place the order so they can follow up on this. If he is unable to get ahold of anyone, we can place the order but it would be best for them to order and follow up on it especially since he does have significant stenosis we would likely refer him back to them anyway.

## 2024-03-12 ENCOUNTER — OFFICE VISIT (OUTPATIENT)
Dept: SURGERY | Facility: OTHER | Age: 89
End: 2024-03-12
Attending: SURGERY
Payer: MEDICARE

## 2024-03-12 VITALS
DIASTOLIC BLOOD PRESSURE: 75 MMHG | OXYGEN SATURATION: 96 % | HEIGHT: 67 IN | HEART RATE: 61 BPM | SYSTOLIC BLOOD PRESSURE: 129 MMHG | BODY MASS INDEX: 23.54 KG/M2 | WEIGHT: 150 LBS | TEMPERATURE: 98.6 F | RESPIRATION RATE: 16 BRPM

## 2024-03-12 DIAGNOSIS — Z95.0 S/P PLACEMENT OF CARDIAC PACEMAKER: Primary | ICD-10-CM

## 2024-03-12 PROCEDURE — G0463 HOSPITAL OUTPT CLINIC VISIT: HCPCS

## 2024-03-12 PROCEDURE — 99024 POSTOP FOLLOW-UP VISIT: CPT | Performed by: SURGERY

## 2024-03-12 ASSESSMENT — PAIN SCALES - GENERAL: PAINLEVEL: NO PAIN (0)

## 2024-03-12 NOTE — PROGRESS NOTES
"Patient presents for post surgical visit after pacemaker generator change on 2/26/2024. Patient has done well. No problems with incision.    /75 (BP Location: Right arm, Patient Position: Sitting, Cuff Size: Adult Large)   Pulse 61   Temp 98.6  F (37  C) (Tympanic)   Resp 16   Ht 1.689 m (5' 6.5\")   Wt 68 kg (150 lb)   SpO2 96%   BMI 23.85 kg/m      General: NAD, pleasant and cooperative with exam and interview.  Chest: healing incisions. No sign of infection. No pain with palpation.  Psychiatry: awake, alert and oriented. Appropriate affect.    Assessment/Plan:  88 year old male s/p pacemaker.    - doing well    Senthil Alford MD on 3/12/2024 at 1:42 PM      "

## 2024-03-12 NOTE — NURSING NOTE
"Chief Complaint   Patient presents with    Surgical Followup       Initial /75 (BP Location: Right arm, Patient Position: Sitting, Cuff Size: Adult Large)   Pulse 61   Temp 98.6  F (37  C) (Tympanic)   Resp 16   Ht 1.689 m (5' 6.5\")   Wt 68 kg (150 lb)   SpO2 96%   BMI 23.85 kg/m   Estimated body mass index is 23.85 kg/m  as calculated from the following:    Height as of this encounter: 1.689 m (5' 6.5\").    Weight as of this encounter: 68 kg (150 lb).  Meds Reconciled: complete      Mamta Pennington, RN     "

## 2024-10-28 ENCOUNTER — MYC MEDICAL ADVICE (OUTPATIENT)
Dept: INTERNAL MEDICINE | Facility: OTHER | Age: 89
End: 2024-10-28
Payer: MEDICARE

## 2024-10-28 NOTE — TELEPHONE ENCOUNTER
Message below came in as CRM Request:  ===View-only below this line===      ----- Message -----       From:Alvaro Bass Jr.       Sent:10/27/2024  9:20 AM CDT         To:Customer Service    Subject:Vaccinations    Topic: Non-Medical Question.    [facility] [facility] also, [place] cholesterol lab.  Alexander Huertas RN on 10/28/2024 at 4:04 PM

## 2025-02-06 PROBLEM — I65.29 CAROTID ARTERY STENOSIS: Chronic | Status: ACTIVE | Noted: 2025-02-06

## 2025-02-06 RX ORDER — BETAMETHASONE DIPROPIONATE 0.5 MG/G
CREAM TOPICAL
COMMUNITY
Start: 2024-10-14

## 2025-02-13 SDOH — HEALTH STABILITY: PHYSICAL HEALTH: ON AVERAGE, HOW MANY MINUTES DO YOU ENGAGE IN EXERCISE AT THIS LEVEL?: 0 MIN

## 2025-02-13 SDOH — HEALTH STABILITY: PHYSICAL HEALTH: ON AVERAGE, HOW MANY DAYS PER WEEK DO YOU ENGAGE IN MODERATE TO STRENUOUS EXERCISE (LIKE A BRISK WALK)?: 2 DAYS

## 2025-02-13 ASSESSMENT — SOCIAL DETERMINANTS OF HEALTH (SDOH): HOW OFTEN DO YOU GET TOGETHER WITH FRIENDS OR RELATIVES?: THREE TIMES A WEEK

## 2025-02-18 ENCOUNTER — LAB (OUTPATIENT)
Dept: LAB | Facility: OTHER | Age: OVER 89
End: 2025-02-18
Payer: MEDICARE

## 2025-02-18 ENCOUNTER — OFFICE VISIT (OUTPATIENT)
Dept: INTERNAL MEDICINE | Facility: OTHER | Age: OVER 89
End: 2025-02-18
Attending: INTERNAL MEDICINE
Payer: MEDICARE

## 2025-02-18 VITALS
BODY MASS INDEX: 23.35 KG/M2 | HEIGHT: 67 IN | SYSTOLIC BLOOD PRESSURE: 138 MMHG | RESPIRATION RATE: 18 BRPM | DIASTOLIC BLOOD PRESSURE: 72 MMHG | TEMPERATURE: 96.9 F | WEIGHT: 148.8 LBS | HEART RATE: 67 BPM | OXYGEN SATURATION: 100 %

## 2025-02-18 DIAGNOSIS — E79.0 HYPERURICEMIA: ICD-10-CM

## 2025-02-18 DIAGNOSIS — L21.9 SEBORRHEIC DERMATITIS OF SCALP: ICD-10-CM

## 2025-02-18 DIAGNOSIS — I25.10 ATHEROSCLEROSIS OF NATIVE CORONARY ARTERY OF NATIVE HEART WITHOUT ANGINA PECTORIS: ICD-10-CM

## 2025-02-18 DIAGNOSIS — Z95.0 CARDIAC PACEMAKER IN SITU: ICD-10-CM

## 2025-02-18 DIAGNOSIS — E53.8 VITAMIN B12 DEFICIENCY: ICD-10-CM

## 2025-02-18 DIAGNOSIS — E78.2 MIXED HYPERLIPIDEMIA: ICD-10-CM

## 2025-02-18 DIAGNOSIS — Z71.85 VACCINE COUNSELING: ICD-10-CM

## 2025-02-18 DIAGNOSIS — D69.6 THROMBOCYTOPENIA: ICD-10-CM

## 2025-02-18 DIAGNOSIS — I10 BENIGN ESSENTIAL HYPERTENSION: ICD-10-CM

## 2025-02-18 DIAGNOSIS — Z00.00 MEDICARE ANNUAL WELLNESS VISIT, SUBSEQUENT: Primary | ICD-10-CM

## 2025-02-18 DIAGNOSIS — N18.31 CKD STAGE 3A, GFR 45-59 ML/MIN (H): ICD-10-CM

## 2025-02-18 DIAGNOSIS — D64.9 ANEMIA, UNSPECIFIED TYPE: ICD-10-CM

## 2025-02-18 DIAGNOSIS — I25.2 PERSONAL HISTORY OF MI (MYOCARDIAL INFARCTION): ICD-10-CM

## 2025-02-18 DIAGNOSIS — R39.9 LOWER URINARY TRACT SYMPTOMS (LUTS): ICD-10-CM

## 2025-02-18 DIAGNOSIS — I65.23 BILATERAL CAROTID ARTERY STENOSIS: ICD-10-CM

## 2025-02-18 DIAGNOSIS — I44.2 ATRIOVENTRICULAR BLOCK, COMPLETE (H): ICD-10-CM

## 2025-02-18 LAB
ALBUMIN SERPL BCG-MCNC: 4.3 G/DL (ref 3.5–5.2)
ALP SERPL-CCNC: 68 U/L (ref 40–150)
ALT SERPL W P-5'-P-CCNC: 14 U/L (ref 0–70)
ANION GAP SERPL CALCULATED.3IONS-SCNC: 12 MMOL/L (ref 7–15)
AST SERPL W P-5'-P-CCNC: 22 U/L (ref 0–45)
BASOPHILS # BLD AUTO: 0 10E3/UL (ref 0–0.2)
BASOPHILS NFR BLD AUTO: 0 %
BILIRUB SERPL-MCNC: 0.8 MG/DL
BUN SERPL-MCNC: 33.1 MG/DL (ref 8–23)
CALCIUM SERPL-MCNC: 9.5 MG/DL (ref 8.8–10.4)
CHLORIDE SERPL-SCNC: 102 MMOL/L (ref 98–107)
CHOLEST SERPL-MCNC: 113 MG/DL
CREAT SERPL-MCNC: 1.52 MG/DL (ref 0.67–1.17)
EGFRCR SERPLBLD CKD-EPI 2021: 44 ML/MIN/1.73M2
EOSINOPHIL # BLD AUTO: 0.1 10E3/UL (ref 0–0.7)
EOSINOPHIL NFR BLD AUTO: 2 %
ERYTHROCYTE [DISTWIDTH] IN BLOOD BY AUTOMATED COUNT: 12.8 % (ref 10–15)
GLUCOSE SERPL-MCNC: 87 MG/DL (ref 70–99)
HCO3 SERPL-SCNC: 24 MMOL/L (ref 22–29)
HCT VFR BLD AUTO: 37.7 % (ref 40–53)
HDLC SERPL-MCNC: 53 MG/DL
HGB BLD-MCNC: 12.9 G/DL (ref 13.3–17.7)
HOLD SPECIMEN: NORMAL
IMM GRANULOCYTES # BLD: 0 10E3/UL
IMM GRANULOCYTES NFR BLD: 0 %
IRON BINDING CAPACITY (ROCHE): 236 UG/DL (ref 240–430)
IRON SATN MFR SERPL: 33 % (ref 15–46)
IRON SERPL-MCNC: 78 UG/DL (ref 61–157)
LDLC SERPL CALC-MCNC: 42 MG/DL
LYMPHOCYTES # BLD AUTO: 1.6 10E3/UL (ref 0.8–5.3)
LYMPHOCYTES NFR BLD AUTO: 26 %
MCH RBC QN AUTO: 32.3 PG (ref 26.5–33)
MCHC RBC AUTO-ENTMCNC: 34.2 G/DL (ref 31.5–36.5)
MCV RBC AUTO: 95 FL (ref 78–100)
MONOCYTES # BLD AUTO: 0.6 10E3/UL (ref 0–1.3)
MONOCYTES NFR BLD AUTO: 9 %
NEUTROPHILS # BLD AUTO: 3.9 10E3/UL (ref 1.6–8.3)
NEUTROPHILS NFR BLD AUTO: 63 %
NONHDLC SERPL-MCNC: 60 MG/DL
NRBC # BLD AUTO: 0 10E3/UL
NRBC BLD AUTO-RTO: 0 /100
PLATELET # BLD AUTO: 132 10E3/UL (ref 150–450)
POTASSIUM SERPL-SCNC: 4.5 MMOL/L (ref 3.4–5.3)
PROT SERPL-MCNC: 6.7 G/DL (ref 6.4–8.3)
RBC # BLD AUTO: 3.99 10E6/UL (ref 4.4–5.9)
SODIUM SERPL-SCNC: 138 MMOL/L (ref 135–145)
TRIGL SERPL-MCNC: 90 MG/DL
VIT B12 SERPL-MCNC: 457 PG/ML (ref 232–1245)
WBC # BLD AUTO: 6.1 10E3/UL (ref 4–11)

## 2025-02-18 PROCEDURE — 85025 COMPLETE CBC W/AUTO DIFF WBC: CPT | Mod: ZL

## 2025-02-18 PROCEDURE — 83540 ASSAY OF IRON: CPT | Mod: ZL

## 2025-02-18 PROCEDURE — 36415 COLL VENOUS BLD VENIPUNCTURE: CPT | Mod: ZL

## 2025-02-18 PROCEDURE — 83718 ASSAY OF LIPOPROTEIN: CPT | Mod: ZL

## 2025-02-18 PROCEDURE — 82607 VITAMIN B-12: CPT | Mod: ZL

## 2025-02-18 PROCEDURE — 84132 ASSAY OF SERUM POTASSIUM: CPT | Mod: ZL

## 2025-02-18 PROCEDURE — G0463 HOSPITAL OUTPT CLINIC VISIT: HCPCS | Performed by: INTERNAL MEDICINE

## 2025-02-18 RX ORDER — HYDROCHLOROTHIAZIDE 25 MG/1
25 TABLET ORAL DAILY
Qty: 90 TABLET | Refills: 4 | Status: SHIPPED | OUTPATIENT
Start: 2025-02-18

## 2025-02-18 RX ORDER — AMLODIPINE BESYLATE 2.5 MG/1
2.5 TABLET ORAL DAILY
Qty: 90 TABLET | Refills: 4 | Status: SHIPPED | OUTPATIENT
Start: 2025-02-18

## 2025-02-18 RX ORDER — CYANOCOBALAMIN (VITAMIN B-12) 2500 MCG
2500 TABLET, SUBLINGUAL SUBLINGUAL DAILY
Qty: 90 TABLET | Refills: 4 | Status: SHIPPED | OUTPATIENT
Start: 2025-02-18

## 2025-02-18 RX ORDER — ASPIRIN 81 MG/1
81 TABLET ORAL DAILY
COMMUNITY
Start: 2025-02-18

## 2025-02-18 RX ORDER — LOSARTAN POTASSIUM 100 MG/1
100 TABLET ORAL DAILY
Qty: 90 TABLET | Refills: 4 | Status: SHIPPED | OUTPATIENT
Start: 2025-02-18 | End: 2025-02-18 | Stop reason: DRUGHIGH

## 2025-02-18 RX ORDER — TAMSULOSIN HYDROCHLORIDE 0.4 MG/1
0.4 CAPSULE ORAL
Qty: 90 CAPSULE | Refills: 4 | Status: SHIPPED | OUTPATIENT
Start: 2025-02-18

## 2025-02-18 RX ORDER — LOSARTAN POTASSIUM 100 MG/1
50 TABLET ORAL 2 TIMES DAILY
COMMUNITY
Start: 2025-02-18

## 2025-02-18 RX ORDER — TRIAMCINOLONE ACETONIDE 1 MG/G
OINTMENT TOPICAL 3 TIMES DAILY
Qty: 80 G | Refills: 2 | Status: SHIPPED | OUTPATIENT
Start: 2025-02-18

## 2025-02-18 RX ORDER — SIMVASTATIN 80 MG
40 TABLET ORAL DAILY
Qty: 45 TABLET | Refills: 4 | Status: SHIPPED | OUTPATIENT
Start: 2025-02-18

## 2025-02-18 RX ORDER — ATENOLOL 50 MG/1
50 TABLET ORAL DAILY
Qty: 90 TABLET | Refills: 4 | Status: SHIPPED | OUTPATIENT
Start: 2025-02-18 | End: 2025-02-18

## 2025-02-18 RX ORDER — ATENOLOL 50 MG/1
25 TABLET ORAL 2 TIMES DAILY
COMMUNITY
Start: 2025-02-18

## 2025-02-18 ASSESSMENT — ENCOUNTER SYMPTOMS
DIZZINESS: 0
WHEEZING: 0
ARTHRALGIAS: 0
VOMITING: 0
DIARRHEA: 0
CHILLS: 0
DIFFICULTY URINATING: 1
AGITATION: 0
BRUISES/BLEEDS EASILY: 0
WOUND: 0
FEVER: 0
ABDOMINAL PAIN: 0
NAUSEA: 0
DYSURIA: 0
MYALGIAS: 0
SHORTNESS OF BREATH: 1
FATIGUE: 1
HEMATURIA: 0
LIGHT-HEADEDNESS: 0
CONFUSION: 0
SLEEP DISTURBANCE: 0
COUGH: 0

## 2025-02-18 ASSESSMENT — PAIN SCALES - GENERAL: PAINLEVEL_OUTOF10: NO PAIN (0)

## 2025-02-18 NOTE — PROGRESS NOTES
Assessment & Plan     ICD-10-CM    1. Medicare annual wellness visit, subsequent  Z00.00       2. Vaccine counseling  Z71.85       3. Benign essential hypertension  I10 Comprehensive metabolic panel     CBC with Platelets & Differential     Iron & Iron Binding Capacity     losartan (COZAAR) 100 MG tablet     atenolol (TENORMIN) 50 MG tablet      4. Mixed hyperlipidemia  E78.2 Lipid Profile     CANCELED: Lipid Profile      5. CKD stage 3a, GFR 45-59 ml/min (H)  N18.31       6. Atherosclerosis of native coronary artery of native heart without angina pectoris  I25.10 simvastatin (ZOCOR) 80 MG tablet     aspirin 81 MG EC tablet      7. Bilateral carotid artery stenosis - Less than 70% bilaterally - Carotid u/p 2023  I65.23 aspirin 81 MG EC tablet      8. Personal history of MI (myocardial infarction)  I25.2       9. Atrioventricular block, complete (H)  I44.2       10. Cardiac pacemaker in situ  Z95.0       11. Hyperuricemia  E79.0       12. Vitamin B12 deficiency  E53.8 Vitamin B12     vitamin B complex with vitamin C (VITAMIN  B COMPLEX) tablet     vitamin B-12 (CYANOCOBALAMIN) 2500 MCG sublingual tablet      13. Thrombocytopenia  D69.6       14. Anemia, unspecified type  D64.9 Iron & Iron Binding Capacity      15. Lower urinary tract symptoms (LUTS)  R39.9 tamsulosin (FLOMAX) 0.4 MG capsule      16. Seborrheic dermatitis of scalp  L21.9 triamcinolone (KENALOG) 0.1 % external ointment      17. 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 - [employer] - [employer]  Z91.85          Patient presents for Medicare annual wellness visit as well as follow-up multiple issues.    Coronary artery disease, also has bilateral carotid artery stenosis, less than 70% bilaterally.  Denies exertional angina.  Doing well with current medication regimen.  Recommend reducing aspirin from full dose aspirin 325 mg down to 81 mg once daily.  Otherwise continues on  half tablet simvastatin, or total of 40 mg daily.  Amlodipine, atenolol, hydrochlorothiazide, losartan.  Medication list updated.    History of complete AV block, has had 3 pacemakers.  Doing well with current pacemaker.    Hyperuricemia, had gout at least once in the past.  No recent gout attacks.  Doing well with current medication regimen.  No changes for now.    Vitamin B12 deficiency.  Ongoing.  Recommend starting B12/B complex.  Prescription sent to pharmacy.    Thrombocytopenia and anemia.  Noted with previous lab work.  Recheck labs today.  Does bruise easily.  Possibly in part due to his full dose aspirin daily.  Recommend dose reduction as noted above.  Close follow-up, consider lab recheck.    Lower urinary tract symptoms.  Ongoing.  Seems to be doing reasonably well with Flomax.  Taking 0.4 mg once daily.  Continue current dosing.    Seborrheic dermatitis of the scalp.  Using topical treatments as needed.    History of  service, served in the [employer] from [id number] in [place].    HYPERTENSION - Ongoing. Blood pressure is currently well controlled.  Medication side effects: None. Denies syncope or presyncope.  Continue current medications.   Medication list reviewed/updated. Refills completed as needed.      MIXED HYPERLIPIDEMIA.  Ongoing. LDL is at goal: Yes. Triglycerides are at goal: Yes.    Medication side effects reported: None.   Continue current medications for now. Medication list reviewed/updated. Refills completed as needed.  Recent Labs   Lab Test 02/15/23  1018 02/07/22  0857   CHOL 108 120   HDL 57 51   LDL 40 52   TRIG 56 87        Chronic Kidney Disease, Stage 3a (GFR 45-59), chronic, ongoing.  Kidney function had been slowly declining.  Encourage NSAID avoidance.    Recent Labs   Lab Test 02/18/25  1012 12/12/23  0029   CR 1.52* 1.49*   GFRESTIMATED 44* 45*        Vaccine counseling completed.  Encourage routine / annual vaccinations.    The longitudinal plan of care for  the diagnosis(es)/condition(s) as documented were addressed during this visit. Due to the added complexity in care, I will continue to support Alvaro in the subsequent management and with ongoing continuity of care.        Results for orders placed or performed in visit on 02/18/25   Extra Tube     Status: None    Narrative    The following orders were created for panel order Extra Tube.  Procedure                               Abnormality         Status                     ---------                               -----------         ------                     Extra Green Top (Lithium...[[id number]]                      Final result               Extra Green Top (Lithium...[[id number]]                      Final result               Extra Purple Top Tube[[id number]]                            Final result                 Please view results for these tests on the individual orders.   Extra Green Top (Lithium Heparin) Tube     Status: None   Result Value Ref Range    Hold Specimen JIC    Extra Green Top (Lithium Heparin) Tube     Status: None   Result Value Ref Range    Hold Specimen JIC    Extra Purple Top Tube     Status: None   Result Value Ref Range    Hold Specimen JIC    Comprehensive metabolic panel     Status: Abnormal   Result Value Ref Range    Sodium 138 135 - 145 mmol/L    Potassium 4.5 3.4 - 5.3 mmol/L    Carbon Dioxide (CO2) 24 22 - 29 mmol/L    Anion Gap 12 7 - 15 mmol/L    Urea Nitrogen 33.1 (H) 8.0 - 23.0 mg/dL    Creatinine 1.52 (H) 0.67 - 1.17 mg/dL    GFR Estimate 44 (L) >60 mL/min/1.73m2    Calcium 9.5 8.8 - 10.4 mg/dL    Chloride 102 98 - 107 mmol/L    Glucose 87 70 - 99 mg/dL    Alkaline Phosphatase 68 40 - 150 U/L    AST 22 0 - 45 U/L    ALT 14 0 - 70 U/L    Protein Total 6.7 6.4 - 8.3 g/dL    Albumin 4.3 3.5 - 5.2 g/dL    Bilirubin Total 0.8 <=1.2 mg/dL   Vitamin B12     Status: Normal   Result Value Ref Range    Vitamin B12 457 232 - 1,245 pg/mL   Iron & Iron Binding Capacity     Status: Abnormal    Result Value Ref Range    Iron 78 61 - 157 ug/dL    Iron Binding Capacity 236 (L) 240 - 430 ug/dL    Iron Sat Index 33 15 - 46 %   CBC with platelets and differential     Status: Abnormal   Result Value Ref Range    WBC Count 6.1 4.0 - 11.0 10e3/uL    RBC Count 3.99 (L) 4.40 - 5.90 10e6/uL    Hemoglobin 12.9 (L) 13.3 - 17.7 g/dL    Hematocrit 37.7 (L) 40.0 - 53.0 %    MCV 95 78 - 100 fL    MCH 32.3 26.5 - 33.0 pg    MCHC 34.2 31.5 - 36.5 g/dL    RDW 12.8 10.0 - 15.0 %    Platelet Count 132 (L) 150 - 450 10e3/uL    % Neutrophils 63 %    % Lymphocytes 26 %    % Monocytes 9 %    % Eosinophils 2 %    % Basophils 0 %    % Immature Granulocytes 0 %    NRBCs per 100 WBC 0 <1 /100    Absolute Neutrophils 3.9 1.6 - 8.3 10e3/uL    Absolute Lymphocytes 1.6 0.8 - 5.3 10e3/uL    Absolute Monocytes 0.6 0.0 - 1.3 10e3/uL    Absolute Eosinophils 0.1 0.0 - 0.7 10e3/uL    Absolute Basophils 0.0 0.0 - 0.2 10e3/uL    Absolute Immature Granulocytes 0.0 <=0.4 10e3/uL    Absolute NRBCs 0.0 10e3/uL   Lipid Profile     Status: Normal   Result Value Ref Range    Cholesterol 113 <200 mg/dL    Triglycerides 90 <150 mg/dL    Direct Measure HDL 53 >=40 mg/dL    LDL Cholesterol Calculated 42 <100 mg/dL    Non HDL Cholesterol 60 <130 mg/dL    Narrative    Cholesterol  Desirable: < 200 mg/dL  Borderline High: 200 - 239 mg/dL  High: >= 240 mg/dL    Triglycerides  Normal: < 150 mg/dL  Borderline High: 150 - 199 mg/dL  High: 200-499 mg/dL  Very High: >= 500 mg/dL    Direct Measure HDL  Female: >= 50 mg/dL   Male: >= 40 mg/dL    LDL Cholesterol  Desirable: < 100 mg/dL  Above Desirable: 100 - 129 mg/dL   Borderline High: 130 - 159 mg/dL   High:  160 - 189 mg/dL   Very High: >= 190 mg/dL    Non HDL Cholesterol  Desirable: < 130 mg/dL  Above Desirable: 130 - 159 mg/dL  Borderline High: 160 - 189 mg/dL  High: 190 - 219 mg/dL  Very High: >= 220 mg/dL   CBC with Platelets & Differential     Status: Abnormal    Narrative    The following orders were created  for panel order CBC with Platelets & Differential.  Procedure                               Abnormality         Status                     ---------                               -----------         ------                     CBC with platelets and d...[[id number]]  Abnormal            Final result               RBC and Platelet Morphology[[id number]]                                                   Please view results for these tests on the individual orders.      LDL and triglycerides are at goal.  Anemia thrombocytopenia noted.  White blood cell count is within normal limits.  Iron is low.  Vitamin B12 is low end of normal.  Potassium 4.5.  Creatinine 1.54.  GFR 44.  Liver enzymes normal.           Counseling  Appropriate preventive services were addressed with this patient via screening, questionnaire, or discussion as appropriate for fall prevention, nutrition, physical activity, Tobacco-use cessation, social engagement, weight loss and cognition.  Checklist reviewing preventive services available has been given to the patient.  Reviewed patient's diet, addressing concerns and/or questions.   He is at risk for lack of exercise and has been provided with information to increase physical activity for the benefit of his well-being.   The patient was instructed to see the dentist every 6 months.   He is at risk for psychosocial distress and has been provided with information to reduce risk.   The patient was provided with written information regarding signs of hearing loss.         Return in about 1 year (around 2/18/2026) for Annual Medicare Wellness Visit.      Irvin Nazario MD  [facility] AND \[facility] [place] [facility]\""    Review of Systems   Constitutional:  Positive for fatigue (minimal). Negative for chills and fever.   HENT:  Positive for hearing loss. Negative for congestion.    Eyes:  Negative for visual disturbance.   Respiratory:  Positive for shortness of breath (relatively stable, with exertion - resolves  "briefly with short breaks - starts after about 3 blocks of walking). Negative for cough and wheezing.    Cardiovascular:  Positive for peripheral edema (minimal, in the evening). Negative for chest pain.   Gastrointestinal:  Negative for abdominal pain, diarrhea, nausea and vomiting.   Endocrine: Negative for cold intolerance and heat intolerance.   Genitourinary:  Positive for difficulty urinating (Ongoing, takes Fl[facility]ax - [facility]). Negative for dysuria and hematuria.        + Nocturia about 2x nightly   Musculoskeletal:  Negative for arthralgias and myalgias.   Skin:  Negative for rash and wound.   Allergic/Immunologic: Negative for immunocompromised state.   Neurological:  Negative for dizziness and light-headedness.   Hematological:  Does not bruise/bleed easily.   Psychiatric/Behavioral:  Negative for agitation, confusion and sleep disturbance.          Rolando John is a 89 year old, presenting for the following health issues:  Medicare Visit        2/18/2025    10:22 AM   Additional Questions   Roomed by PJ Johnson     HPI                   Objective    /72   Pulse 67   Temp 96.9  F (36.1  C) (Tympanic)   Resp 18   Ht 1.702 m (5' 7\")   Wt 67.5 kg (148 lb 12.8 oz)   SpO2 100%   BMI 23.31 kg/m    Body mass index is 23.31 kg/m .  Physical Exam  Constitutional:       General: He is not in acute distress.     Appearance: Normal appearance. He is well-developed. He is not ill-appearing.   HENT:      Ears:      Comments: Bilateral hearing aides  Eyes:      General: No scleral icterus.     Conjunctiva/sclera: Conjunctivae normal.   Neck:      Vascular: Carotid bruit (Bilateral) present.   Cardiovascular:      Rate and Rhythm: Normal rate and regular rhythm.      Pulses: Normal pulses.   Pulmonary:      Effort: Pulmonary effort is normal. No respiratory distress.      Breath sounds: No wheezing.   Abdominal:      Palpations: Abdomen is soft.      Tenderness: There is no abdominal tenderness. "   Musculoskeletal:         General: No tenderness or deformity.      Right lower leg: No edema (trace).      Left lower leg: No edema (trace).   Skin:     General: Skin is warm and dry.      Findings: No rash.   Neurological:      Mental Status: He is alert. Mental status is at baseline.      Cranial Nerves: No cranial nerve deficit.   Psychiatric:         Mood and Affect: Mood normal.         Behavior: Behavior normal.                    Signed Electronically by: Irvin Nazario MD

## 2025-02-18 NOTE — PATIENT INSTRUCTIONS
Blood pressure is well controlled.     Medications refilled.       -- Mild anemia / low hemoglobin noted....   Hopefully will improve with the B12 replacement.       Vitamin B12 level returned in the LOW - normal range....       Consider Starting B12 dissolving tablet.... and B-complex pill.   These are over the counter.       Overall - Labs are relatively stable.       Results for orders placed or performed in visit on 02/18/25   Extra Tube     Status: None (In process)    Narrative    The following orders were created for panel order Extra Tube.  Procedure                               Abnormality         Status                     ---------                               -----------         ------                     Extra Green Top (Lithium...[[id number]]                      In process                 Extra Green Top (Lithium...[[id number]]                      In process                 Extra Purple Top Tube[[id number]]                            In process                   Please view results for these tests on the individual orders.   Comprehensive metabolic panel     Status: Abnormal   Result Value Ref Range    Sodium 138 135 - 145 mmol/L    Potassium 4.5 3.4 - 5.3 mmol/L    Carbon Dioxide (CO2) 24 22 - 29 mmol/L    Anion Gap 12 7 - 15 mmol/L    Urea Nitrogen 33.1 (H) 8.0 - 23.0 mg/dL    Creatinine 1.52 (H) 0.67 - 1.17 mg/dL    GFR Estimate 44 (L) >60 mL/min/1.73m2    Calcium 9.5 8.8 - 10.4 mg/dL    Chloride 102 98 - 107 mmol/L    Glucose 87 70 - 99 mg/dL    Alkaline Phosphatase 68 40 - 150 U/L    AST 22 0 - 45 U/L    ALT 14 0 - 70 U/L    Protein Total 6.7 6.4 - 8.3 g/dL    Albumin 4.3 3.5 - 5.2 g/dL    Bilirubin Total 0.8 <=1.2 mg/dL   Vitamin B12     Status: Normal   Result Value Ref Range    Vitamin B12 457 232 - 1,245 pg/mL   Iron & Iron Binding Capacity     Status: Abnormal   Result Value Ref Range    Iron 78 61 - 157 ug/dL    Iron Binding Capacity 236 (L) 240 - 430 ug/dL    Iron Sat Index 33 15 - 46 %    CBC with platelets and differential     Status: Abnormal   Result Value Ref Range    WBC Count 6.1 4.0 - 11.0 10e3/uL    RBC Count 3.99 (L) 4.40 - 5.90 10e6/uL    Hemoglobin 12.9 (L) 13.3 - 17.7 g/dL    Hematocrit 37.7 (L) 40.0 - 53.0 %    MCV 95 78 - 100 fL    MCH 32.3 26.5 - 33.0 pg    MCHC 34.2 31.5 - 36.5 g/dL    RDW 12.8 10.0 - 15.0 %    Platelet Count 132 (L) 150 - 450 10e3/uL    % Neutrophils 63 %    % Lymphocytes 26 %    % Monocytes 9 %    % Eosinophils 2 %    % Basophils 0 %    % Immature Granulocytes 0 %    NRBCs per 100 WBC 0 <1 /100    Absolute Neutrophils 3.9 1.6 - 8.3 10e3/uL    Absolute Lymphocytes 1.6 0.8 - 5.3 10e3/uL    Absolute Monocytes 0.6 0.0 - 1.3 10e3/uL    Absolute Eosinophils 0.1 0.0 - 0.7 10e3/uL    Absolute Basophils 0.0 0.0 - 0.2 10e3/uL    Absolute Immature Granulocytes 0.0 <=0.4 10e3/uL    Absolute NRBCs 0.0 10e3/uL   CBC with Platelets & Differential     Status: Abnormal    Narrative    The following orders were created for panel order CBC with Platelets & Differential.  Procedure                               Abnormality         Status                     ---------                               -----------         ------                     CBC with platelets and d...[[id number]]  Abnormal            Final result               RBC and Platelet Morphology[[id number]]                                                   Please view results for these tests on the individual orders.        Return in approximately 1 year, or sooner as needed for follow-up with Dr. Nazario.  - Annual Follow-up / Physical - Medicare Annual Wellness Visit     Clinic : [phone]  Appointment line: [phone]           Patient Education   Preventive Care Advice   This is general advice given by our system to help you stay healthy. However, your care team may have specific advice just for you. Please talk to your care team about your preventive care needs.  Nutrition  Eat 5 or more servings of fruits  and vegetables each day.  Try wheat bread, brown rice and whole grain pasta (instead of white bread, rice, and pasta).  Get enough calcium and vitamin D. Check the label on foods and aim for 100% of the RDA (recommended daily allowance).  Lifestyle  Exercise at least 150 minutes each week  (30 minutes a day, 5 days a week).  Do muscle strengthening activities 2 days a week. These help control your weight and prevent disease.  No smoking.  Wear sunscreen to prevent skin cancer.  Have a dental exam and cleaning every 6 months.  Yearly exams  See your health care team every year to talk about:  Any changes in your health.  Any medicines your care team has prescribed.  Preventive care, family planning, and ways to prevent chronic diseases.  Shots (vaccines)   HPV shots (up to age 26), if you've never had them before.  Hepatitis B shots (up to age 59), if you've never had them before.  COVID-19 shot: Get this shot when it's due.  Flu shot: Get a flu shot every year.  Tetanus shot: Get a tetanus shot every 10 years.  Pneumococcal, hepatitis A, and RSV shots: Ask your care team if you need these based on your risk.  Shingles shot (for age 50 and up)  General health tests  Diabetes screening:  Starting at age 35, Get screened for diabetes at least every 3 years.  If you are younger than age 35, ask your care team if you should be screened for diabetes.  Cholesterol test: At age 39, start having a cholesterol test every 5 years, or more often if advised.  Bone density scan (DEXA): At age 50, ask your care team if you should have this scan for osteoporosis (brittle bones).  Hepatitis C: Get tested at least once in your life.  STIs (sexually transmitted infections)  Before age 24: Ask your care team if you should be screened for STIs.  After age 24: Get screened for STIs if you're at risk. You are at risk for STIs (including HIV) if:  You are sexually active with more than one person.  You don't use condoms every time.  You or  a partner was diagnosed with a sexually transmitted infection.  If you are at risk for HIV, ask about PrEP medicine to prevent HIV.  Get tested for HIV at least once in your life, whether you are at risk for HIV or not.  Cancer screening tests  Cervical cancer screening: If you have a cervix, begin getting regular cervical cancer screening tests starting at age 21.  Breast cancer scan (mammogram): If you've ever had breasts, begin having regular mammograms starting at age 40. This is a scan to check for breast cancer.  Colon cancer screening: It is important to start screening for colon cancer at age 45.  Have a colonoscopy test every 10 years (or more often if you're at risk) Or, ask your provider about stool tests like a FIT test every year or Cologuard test every 3 years.  To learn more about your testing options, visit:   .  For help making a decision, visit:   https://bit.ly/pj21215.  Prostate cancer screening test: If you have a prostate, ask your care team if a prostate cancer screening test (PSA) at age 55 is right for you.  Lung cancer screening: If you are a current or former smoker ages 50 to 80, ask your care team if ongoing lung cancer screenings are right for you.  For informational purposes only. Not to replace the advice of your health care provider. Copyright   2023 [facility]. All rights reserved. Clinically reviewed by the [facility]. [employer] [id number] - REV 01/24.  Hearing Loss: Care Instructions  Overview     Hearing loss is a sudden or slow decrease in how well you hear. It can range from slight to profound. Permanent hearing loss can occur with aging. It also can happen when you are exposed long-term to loud noise. Examples include listening to loud music, riding motorcycles, or being around other loud machines.  Hearing loss can affect your work and home life. It can make you feel lonely or depressed. You may feel that you have lost your independence.  But hearing aids and other devices can help you hear better and feel connected to others.  Follow-up care is a key part of your treatment and safety. Be sure to make and go to all appointments, and call your doctor if you are having problems. It's also a good idea to know your test results and keep a list of the medicines you take.  How can you care for yourself at home?  Avoid loud noises whenever possible. This helps keep your hearing from getting worse.  Always wear hearing protection around loud noises.  Wear a hearing aid as directed.  A professional can help you pick a hearing aid that will work best for you.  You can also get hearing aids over the counter for mild to moderate hearing loss.  Have hearing tests as your doctor suggests. They can show whether your hearing has changed. Your hearing aid may need to be adjusted.  Use other devices as needed. These may include:  Telephone amplifiers and hearing aids that can connect to a television, stereo, radio, or microphone.  Devices that use lights or vibrations. These alert you to the doorbell, a ringing telephone, or a baby monitor.  Television closed-captioning. This shows the words at the bottom of the screen. Most new TVs can do this.  TTY (text telephone). This lets you type messages back and forth on the telephone instead of talking or listening. These devices are also called TDD. When messages are typed on the keyboard, they are sent over the phone line to a receiving TTY. The message is shown on a monitor.  Use text messaging, social media, and email if it is hard for you to communicate by telephone.  Try to learn a listening technique called speechreading. It is not lipreading. You pay attention to people's gestures, expressions, posture, and tone of voice. These clues can help you understand what a person is saying. Face the person you are talking to, and have them face you. Make sure the lighting is good. You need to see the other person's face  "clearly.  Think about counseling if you need help to adjust to your hearing loss.  When should you call for help?  Watch closely for changes in your health, and be sure to contact your doctor if:    You think your hearing is getting worse.     You have new symptoms, such as dizziness or nausea.   Where can you learn more?  Go to https://www.Spacenet.net/patiented  Enter R798 in the search box to learn more about \"Hearing Loss: Care Instructions.\"  Current as of: September 27, 2023  Content Version: 14.3    2024 [employer].   Care instructions adapted under license by your healthcare professional. If you have questions about a medical condition or this instruction, always ask your healthcare professional. [employer] disclaims any warranty or liability for your use of this information.    Learning About Stress  What is stress?     Stress is your body's response to a hard situation. Your body can have a physical, emotional, or mental response. Stress is a fact of life for most people, and it affects everyone differently. What causes stress for you may not be stressful for someone else.  A lot of things can cause stress. You may feel stress when you go on a job interview, take a test, or run a race. This kind of short-term stress is normal and even useful. It can help you if you need to work hard or react quickly. For example, stress can help you finish an important job on time.  Long-term stress is caused by ongoing stressful situations or events. Examples of long-term stress include long-term health problems, ongoing problems at work, or conflicts in your family. Long-term stress can harm your health.  How does stress affect your health?  When you are stressed, your body responds as though you are in danger. It makes hormones that speed up your heart, make you breathe faster, and give you a burst of energy. This is called the fight-or-flight stress response. If the stress is over quickly, " your body goes back to normal and no harm is done.  But if stress happens too often or lasts too long, it can have bad effects. Long-term stress can make you more likely to get sick, and it can make symptoms of some diseases worse. If you tense up when you are stressed, you may develop neck, shoulder, or low back pain. Stress is linked to high blood pressure and heart disease.  Stress also harms your emotional health. It can make you khan, tense, or depressed. Your relationships may suffer, and you may not do well at work or school.  What can you do to manage stress?  You can try these things to help manage stress:   Do something active. Exercise or activity can help reduce stress. Walking is a great way to get started. Even everyday activities such as housecleaning or yard work can help.  Try yoga or americo chi. These techniques combine exercise and meditation. You may need some training at first to learn them.  Do something you enjoy. For example, listen to music or go to a movie. Practice your hobby or do volunteer work.  Meditate. This can help you relax, because you are not worrying about what happened before or what may happen in the future.  Do guided imagery. Imagine yourself in any setting that helps you feel calm. You can use online videos, books, or a teacher to guide you.  Do breathing exercises. For example:  From a standing position, bend forward from the waist with your knees slightly bent. Let your arms dangle close to the floor.  Breathe in slowly and deeply as you return to a standing position. Roll up slowly and lift your head last.  Hold your breath for just a few seconds in the standing position.  Breathe out slowly and bend forward from the waist.  Let your feelings out. Talk, laugh, cry, and express anger when you need to. Talking with supportive friends or family, a counselor, or a ki leader about your feelings is a healthy way to relieve stress. Avoid discussing your feelings with people  "who make you feel worse.  Write. It may help to write about things that are bothering you. This helps you find out how much stress you feel and what is causing it. When you know this, you can find better ways to cope.  What can you do to prevent stress?  You might try some of these things to help prevent stress:  Manage your time. This helps you find time to do the things you want and need to do.  Get enough sleep. Your body recovers from the stresses of the day while you are sleeping.  Get support. Your family, friends, and community can make a difference in how you experience stress.  Limit your news feed. Avoid or limit time on social media or news that may make you feel stressed.  Do something active. Exercise or activity can help reduce stress. Walking is a great way to get started.  Where can you learn more?  Go to https://www.TabTale.net/patiented  Enter N032 in the search box to learn more about \"Learning About Stress.\"  Current as of: October 24, 2023  Content Version: 14.3    2024 [employer].   Care instructions adapted under license by your healthcare professional. If you have questions about a medical condition or this instruction, always ask your healthcare professional. [employer] disclaims any warranty or liability for your use of this information.       "

## 2025-02-18 NOTE — PROGRESS NOTES
Preventive Care Visit  [facility]  Irvin Nazario MD, Internal Medicine  Feb 18, 2025        Encounter for Medicare annual wellness exam  Up to date on all immunizations and Health Maintenance Areas      Patient has been advised of split billing requirements and indicates understanding: Yes        Counseling  Appropriate preventive services were addressed with this patient via screening, questionnaire, or discussion as appropriate for fall prevention, nutrition, physical activity, Tobacco-use cessation, social engagement, weight loss and cognition.  Checklist reviewing preventive services available has been given to the patient.  Reviewed patient's diet, addressing concerns and/or questions.   He is at risk for lack of exercise and has been provided with information to increase physical activity for the benefit of his well-being.   The patient was instructed to see the dentist every 6 months.   He is at risk for psychosocial distress and has been provided with information to reduce risk.   The patient was provided with written information regarding signs of hearing loss.       Return in about 1 year (around 2/18/2026) for Annual Medicare Wellness Visit.      Rolando John is a 89 year old, presenting for the following:  Medicare Visit        2/18/2025    10:22 AM   Additional Questions   Roomed by PJ Johnson   Receives annual eye exams.  Has false teeth, only goes goes to dentist if needed.        Health Care Directive  Patient has a Health Care Directive on file        2/13/2025   General Health   How would you rate your overall physical health? Good   Feel stress (tense, anxious, or unable to sleep) To some extent   (!) STRESS CONCERN      2/13/2025   Nutrition   Diet: Regular (no restrictions)         2/13/2025   Exercise   Days per week of moderate/strenous exercise 2 days   Average minutes spent exercising at this level 0 min   (!) EXERCISE CONCERN      2/13/2025   Social Factors    Frequency of gathering with friends or relatives Three times a week   Worry food won't last until get money to buy more No   Food not last or not have enough money for food? No   Do you have housing? (Housing is defined as stable permanent housing and does not include staying ouside in a car, in a tent, in an abandoned building, in an overnight shelter, or couch-surfing.) Yes   Are you worried about losing your housing? No   Lack of transportation? No   Unable to get utilities (heat,electricity)? No         2/13/2025   Fall Risk   Fallen 2 or more times in the past year? No   Trouble with walking or balance? No          2/13/2025   Activities of Daily Living- Home Safety   Needs help with the following daily activites None of the above   Safety concerns in the home None of the above         2/13/2025   Dental   Dentist two times every year? (!) NO         2/13/2025   Hearing Screening   Hearing concerns? (!) I FEEL THAT PEOPLE ARE MUMBLING OR NOT SPEAKING CLEARLY.    (!) I NEED TO ASK PEOPLE TO SPEAK UP OR REPEAT THEMSELVES.    (!) IT'S HARDER TO UNDERSTAND WOMEN'S VOICES THAN MEN'S VOICES.    (!) IT'S HARD TO FOLLOW A CONVERSATION IN A NOISY RESTAURANT OR CROWDED ROOM.    (!) TROUBLE UNDESTANDING A SPEAKER IN A PUBLIC MEETING OR Restorationist SERVICE.    (!) TROUBLE FOLLOWING DIALOGUE IN THE THEATHER.    (!) TROUBLE UNDERSTANDING SOFT OR WHISPERED SPEECH.    (!) TROUBLE UNDERSTANDING SPEECH ON THE TELEPHONE       Multiple values from one day are sorted in reverse-chronological order         2/13/2025   Driving Risk Screening   Patient/family members have concerns about driving No         2/13/2025   General Alertness/Fatigue Screening   Have you been more tired than usual lately? No         2/13/2025   Urinary Incontinence Screening   Bothered by leaking urine in past 6 months No         2/9/2024   TB Screening   Were you born outside of the [place]? No         Today's PHQ-2 Score:       2/17/2025     5:45 PM   PHQ-2  (  [employer])   Q1: Little interest or pleasure in doing things 0   Q2: Feeling down, depressed or hopeless 0   PHQ-2 Score 0    Q1: Little interest or pleasure in doing things Not at all   Q2: Feeling down, depressed or hopeless Not at all   PHQ-2 Score 0       Patient-reported           2025   Substance Use   Alcohol more than 3/day or more than 7/wk Not Applicable   Do you have a current opioid prescription? No   How severe/bad is pain from 1 to 10? 0/10 (No Pain)   Do you use any other substances recreationally? No     Social History     Tobacco Use    Smoking status: Former     Current packs/day: 0.00     Types: Cigarettes     Quit date: 1999     Years since quittin.1    Smokeless tobacco: Never   Vaping Use    Vaping status: Never Used   Substance Use Topics    Alcohol use: No     Alcohol/week: 0.0 standard drinks of alcohol    Drug use: No                Reviewed and updated as needed this visit by Provider   Tobacco  Allergies  Meds  Problems  Med Hx  Surg Hx  Fam Hx     Sexual Activity                              Current providers sharing in care for this patient include:  Patient Care Team:  Irvin Nazario MD as PCP - General (Internal Medicine)  Zaire Bhatti MD as Assigned PCP  Senthil Alford MD as Assigned Surgical Provider    The following health maintenance items are reviewed in Epic and correct as of today:  Health Maintenance   Topic Date Due    LIPID  02/15/2024    COVID-19 Vaccine ( season) 2025    MEDICARE ANNUAL WELLNESS VISIT  2026    BMP  2026    FALL RISK ASSESSMENT  2026    DTAP/TDAP/TD IMMUNIZATION (3 - Td or Tdap) 11/10/2026    ADVANCE CARE PLANNING  2029    PHQ-2 (once per calendar year)  Completed    INFLUENZA VACCINE  Completed    Pneumococcal Vaccine: 50+ Years  Completed    ZOSTER IMMUNIZATION  Completed    RSV VACCINE  Completed    HPV IMMUNIZATION  Aged Out    MENINGITIS IMMUNIZATION  Aged Out    MICROALBUMIN   "Discontinued    HEMOGLOBIN  Discontinued    URINALYSIS  Discontinued                Objective      Exam  /72   Pulse 67   Temp 96.9  F (36.1  C) (Tympanic)   Resp 18   Ht 1.702 m (5' 7\")   Wt 67.5 kg (148 lb 12.8 oz)   SpO2 100%   BMI 23.31 kg/m               2/18/2025   Mini Cog   Clock Draw Score 2 Normal   3 Item Recall 3 objects recalled   Mini Cog Total Score 5              Signed Electronically by: Irvin Nazario MD    "

## 2025-07-10 ENCOUNTER — OFFICE VISIT (OUTPATIENT)
Dept: FAMILY MEDICINE | Facility: OTHER | Age: OVER 89
End: 2025-07-10
Payer: MEDICARE

## 2025-07-10 VITALS
HEIGHT: 67 IN | RESPIRATION RATE: 18 BRPM | HEART RATE: 67 BPM | TEMPERATURE: 97.9 F | SYSTOLIC BLOOD PRESSURE: 128 MMHG | BODY MASS INDEX: 22.76 KG/M2 | OXYGEN SATURATION: 98 % | WEIGHT: 145 LBS | DIASTOLIC BLOOD PRESSURE: 78 MMHG

## 2025-07-10 DIAGNOSIS — Z91.89 AT HIGH RISK FOR TICK BORNE ILLNESS: ICD-10-CM

## 2025-07-10 DIAGNOSIS — M43.6 NECK STIFFNESS: Primary | ICD-10-CM

## 2025-07-10 DIAGNOSIS — R29.898 LEG WEAKNESS, BILATERAL: ICD-10-CM

## 2025-07-10 LAB
BASOPHILS # BLD AUTO: 0 10E3/UL (ref 0–0.2)
BASOPHILS NFR BLD AUTO: 1 %
EOSINOPHIL # BLD AUTO: 0.1 10E3/UL (ref 0–0.7)
EOSINOPHIL NFR BLD AUTO: 1 %
ERYTHROCYTE [DISTWIDTH] IN BLOOD BY AUTOMATED COUNT: 13.1 % (ref 10–15)
HCT VFR BLD AUTO: 36.1 % (ref 40–53)
HGB BLD-MCNC: 12.3 G/DL (ref 13.3–17.7)
IMM GRANULOCYTES # BLD: 0 10E3/UL
IMM GRANULOCYTES NFR BLD: 0 %
LYMPHOCYTES # BLD AUTO: 1.2 10E3/UL (ref 0.8–5.3)
LYMPHOCYTES NFR BLD AUTO: 20 %
MCH RBC QN AUTO: 32.2 PG (ref 26.5–33)
MCHC RBC AUTO-ENTMCNC: 34.1 G/DL (ref 31.5–36.5)
MCV RBC AUTO: 95 FL (ref 78–100)
MONOCYTES # BLD AUTO: 0.6 10E3/UL (ref 0–1.3)
MONOCYTES NFR BLD AUTO: 9 %
NEUTROPHILS # BLD AUTO: 4.2 10E3/UL (ref 1.6–8.3)
NEUTROPHILS NFR BLD AUTO: 70 %
NRBC # BLD AUTO: 0 10E3/UL
NRBC BLD AUTO-RTO: 0 /100
PLATELET # BLD AUTO: 139 10E3/UL (ref 150–450)
RBC # BLD AUTO: 3.82 10E6/UL (ref 4.4–5.9)
WBC # BLD AUTO: 6.1 10E3/UL (ref 4–11)

## 2025-07-10 PROCEDURE — 85018 HEMOGLOBIN: CPT | Mod: ZL | Performed by: NURSE PRACTITIONER

## 2025-07-10 PROCEDURE — 36415 COLL VENOUS BLD VENIPUNCTURE: CPT | Mod: ZL | Performed by: NURSE PRACTITIONER

## 2025-07-10 RX ORDER — DOXYCYCLINE 100 MG/1
100 CAPSULE ORAL 2 TIMES DAILY
Qty: 20 CAPSULE | Refills: 0 | Status: SHIPPED | OUTPATIENT
Start: 2025-07-10 | End: 2025-07-20

## 2025-07-10 ASSESSMENT — PAIN SCALES - GENERAL: PAINLEVEL_OUTOF10: MILD PAIN (2)

## 2025-07-10 NOTE — PROGRESS NOTES
ASSESSMENT/PLAN:     I have reviewed the nursing notes.  I have reviewed the findings, diagnosis, plan and need for follow up with the patient.          Discussed with patient that symptoms and exam are consistent with viral illness.    No clinical indications for antibiotic treatment at this time.      Symptomatic treatment - Encouraged fluids, salt water gargles, honey, elevation, humidifier, saline nasal spray, sinus rinse/netti pot, lozenges, tea, soup, smoothies, popsicles, topical vapor rub, rest, etc     May use over-the-counter Tylenol or ibuprofen PRN    Discussed warning signs/symptoms indicative of need to f/u  Follow up if symptoms persist or worsen or concerns      I explained my diagnostic considerations and recommendations to the patient, who voiced understanding and agreement with the treatment plan. All questions were answered. We discussed potential side effects of any prescribed or recommended therapies, as well as expectations for response to treatments.    Aleena Vivar NP  [facility]      SUBJECTIVE:   Alvaro Bass Jr. is a 89 year old male who presents to clinic today for the following health issues:  Possible lyme    HPI  Likely deer tick bite about 3 weeks ago - states he removed 3 crawling deer ticks on him.  Left inner ankle with small raised lump until the skin about 1.5 weeks ago and he dug out pieces from the skin and treated with antibiotic ointment - now healed over with just a small scab.  He also had a tick on his back during a shower.  Bilateral leg weakness and fatigued muscles with walking, leg aching, back ache, neck stiffness the past few weeks.  No significant fatigue.  No fevers.  No chest pain, palpitations, cough or shortness of breath.  No vision change.  Mild dizziness when standing too fast.  Chronic mild feet/ankle edema, right >left.  Remains active.         Past Medical History:   Diagnosis Date    Atherosclerotic heart disease of native  coronary artery without angina pectoris     No Comments Provided    Atrioventricular block, complete (H)     No Comments Provided    Bladder neck obstruction     No Comments Provided    Diverticulosis of large intestine without perforation or abscess without bleeding     3/9/2015    History of colonic polyps     ,not adenomatous    Non-ST elevation (NSTEMI) myocardial infarction (H)     1999,with favorable post myocardial infarction thallium studies.    Occlusion and stenosis of unspecified carotid artery     2012,Moderate <60% bilateral on US     Past Surgical History:   Procedure Laterality Date    APPENDECTOMY OPEN      COLONOSCOPY      2010,Numerous polyps-all hyperplastic.  Next due .    COLONOSCOPY      3/9/15,F/U N/A age    CYSTOSCOPY      No Comments Provided    EXTRACAPSULAR CATARACT EXTRATION WITH INTRAOCULAR LENS IMPLANT Bilateral     HEART CATH, ANGIOPLASTY      ,right coronary artery    HERNIA REPAIR Right 2016    LAPAROSCOPIC INCARCERATED INGUINAL HERNIA REPAIR    IMPLANT PACEMAKER      2005,Dual chamber pacemaker, [employer], model:  E2ER01    REPLACE PACEMAKER GENERATOR      2013    REPLACE PACEMAKER GENERATOR N/A 2024    Procedure: Replace pacemaker generator;  Surgeon: Senthil Alford MD;  Location:  OR     Social History     Tobacco Use    Smoking status: Former     Current packs/day: 0.00     Types: Cigarettes     Quit date: 1999     Years since quittin.5    Smokeless tobacco: Never   Substance Use Topics    Alcohol use: No     Alcohol/week: 0.0 standard drinks of alcohol     Current Outpatient Medications   Medication Sig Dispense Refill    amLODIPine (NORVASC) 2.5 MG tablet Take 1 tablet (2.5 mg) by mouth daily. 90 tablet 4    aspirin 81 MG EC tablet Take 1 tablet (81 mg) by mouth daily. -- Dose Reduced 2025 --    Stop Aspirin 325 mg  --      atenolol (TENORMIN) 50 MG tablet Take 0.5 tablets (25 mg) by mouth 2 times daily. -- [facility]  [facility] --      augmented betamethasone dipropionate (DIPROLENE AF) 0.05 % external cream APPLY TWICE DAILY AS NEEDED TO ITCHY/INFLAMED LESIONS ON BACK OF SCALP AND NECK.      fluocinonide (LIDEX) 0.05 % external ointment Apply topically 2 times daily      hydrochlorothiazide (HYDRODIURIL) 25 MG tablet Take 1 tablet (25 mg) by mouth daily. 90 tablet 4    losartan (COZAAR) 100 MG tablet Take 0.5 tablets (50 mg) by mouth 2 times daily. -- [facility] --      mometasone (ELOCON) 0.1 % external cream Apply to ears with clean hands twice a day for 14 days then as needed 45 g 2    simvastatin (ZOCOR) 80 MG tablet Take 0.5 tablets (40 mg) by mouth daily. 45 tablet 4    tamsulosin (FLOMAX) 0.4 MG capsule Take 1 capsule (0.4 mg) by mouth daily with food. 90 capsule 4    triamcinolone (KENALOG) 0.1 % external ointment Apply topically 3 times daily. 80 g 2    vitamin B complex with vitamin C (VITAMIN  B COMPLEX) tablet Take 1 tablet by mouth daily. -- make sure it has B12 in tablet -- or every other day -- For B12 replacement -- OTC Okay 100 tablet 4    vitamin B-12 (CYANOCOBALAMIN) 2500 MCG sublingual tablet Take 1 tablet (2,500 mcg) by mouth daily. -- or every other day -- For B12 replacement -- OTC Okay 90 tablet 4     No Known Allergies      Past medical history, past surgical history, current medications and allergies reviewed and accurate to the best of my knowledge.        OBJECTIVE:     /78 (BP Location: Left arm, Patient Position: Sitting, Cuff Size: Adult Regular)   Pulse 67   Temp 97.9  F (36.6  C) (Tympanic)   Resp 18   Ht 1.702 m (5' 7\")   Wt 65.8 kg (145 lb)   SpO2 98%   BMI 22.71 kg/m    Body mass index is 22.71 kg/m .  Physical Exam    General Appearance: Well appearing ***, appropriate appearance for age. No acute distress  Ears: Left TM intact, no erythema, no effusion, no bulging, no purulence.  Right TM intact, no erythema, no effusion, no bulging, no purulence.  Left auditory canal " clear without drainage or bleeding.  Right auditory canal clear without drainage or bleeding.  Normal external ears, non tender.  Eyes: conjunctivae normal without erythema or irritation, corneas clear, no drainage or crusting, no eyelid swelling, pupils equal   Orophayrnx: moist mucous membranes, pharynx without erythema, tonsils without hypertrophy, tonsils without erythema, no tonsillar exudates, no oral lesions, no palate petechiae, no post nasal drip seen, no trismus, voice clear.    Sinuses:  No sinus tenderness upon palpation of the frontal or maxillary sinuses  Nose:  No noted drainage or congestion   Neck: supple without adenopathy  Respiratory: normal chest wall and respirations.  Normal effort.  Clear to auscultation bilaterally, no wheezing, crackles or rhonchi.  No increased work of breathing.  No cough appreciated.  Cardiac: RRR with no murmurs  Abdomen: soft, nontender, no rigidity, no rebound tenderness or guarding, normal bowel sounds present  :  No suprapubic tenderness to palpation.  No CVA tenderness to palpation.    Musculoskeletal:  Equal movement of bilateral upper extremities.  Equal movement of bilateral lower extremities.  Normal gait.    Dermatological: no rashes noted of exposed skin  Psychological: normal affect, alert, oriented, and pleasant.       Labs:    Imaging:

## 2025-07-10 NOTE — PATIENT INSTRUCTIONS
Do not take any  vitamins or dairy products such as milk, yogurt, ice cream or cheese 2 hours prior to and 2 hours after taking Doxycycline

## 2025-07-10 NOTE — PROGRESS NOTES
"Chief Complaint   Patient presents with    Blood Draw     Test for lymes   Patient is here to be seen for lymes.    FOOD SECURITY SCREENING QUESTIONS  Hunger Vital Signs:  Within the past 12 months we worried whether our food would run out before we got money to buy more. Never  Within the past 12 months the food we bought just didn't last and we didn't have money to get more. Never  Johnna Zimmer 7/10/2025 2:23 PM      Initial /78 (BP Location: Left arm, Patient Position: Sitting, Cuff Size: Adult Regular)   Pulse 67   Temp 97.9  F (36.6  C) (Tympanic)   Resp 18   Ht 1.702 m (5' 7\")   Wt 65.8 kg (145 lb)   SpO2 98%   BMI 22.71 kg/m   Estimated body mass index is 22.71 kg/m  as calculated from the following:    Height as of this encounter: 1.702 m (5' 7\").    Weight as of this encounter: 65.8 kg (145 lb).  Medication Reconciliation: complete    Johnna Zimmer   "

## 2025-07-12 LAB
A PHAGOCYTOPH DNA BLD QL NAA+PROBE: NOT DETECTED
BABESIA DNA BLD QL NAA+PROBE: NOT DETECTED
EHRLICHIA DNA SPEC QL NAA+PROBE: NOT DETECTED

## 2025-07-14 LAB — B BURGDOR IGG+IGM SER QL: 0.06

## (undated) DEVICE — KIT WRENCH 5873W

## (undated) DEVICE — SU MONOCRYL 4-0 PS-2 27" UND Y426H

## (undated) DEVICE — SOL WATER 1500ML

## (undated) DEVICE — PACK MAJOR LAPAROTOMY LF SBA15MLFCA

## (undated) DEVICE — GLOVE BIOGEL INDICATOR 7.5 LF 41675

## (undated) DEVICE — SU VICRYL 3-0 SH 27" UND J416H

## (undated) DEVICE — SU DERMABOND ADVANCED .7ML DNX12

## (undated) DEVICE — PENCIL MEGADYNE TELESCOPING SMOKE EVACUATION 10 FT 251010J

## (undated) DEVICE — PREP CHLORAPREP 26ML TINTED ORANGE  260815

## (undated) DEVICE — GLOVE PROTEXIS POWDER FREE SMT 7.5  2D72PT75X

## (undated) RX ORDER — BUPIVACAINE HYDROCHLORIDE 2.5 MG/ML
INJECTION, SOLUTION EPIDURAL; INFILTRATION; INTRACAUDAL
Status: DISPENSED
Start: 2024-02-26

## (undated) RX ORDER — ONDANSETRON 2 MG/ML
INJECTION INTRAMUSCULAR; INTRAVENOUS
Status: DISPENSED
Start: 2024-02-26

## (undated) RX ORDER — PROPOFOL 10 MG/ML
INJECTION, EMULSION INTRAVENOUS
Status: DISPENSED
Start: 2024-02-26

## (undated) RX ORDER — CEFAZOLIN SODIUM 1 G/3ML
INJECTION, POWDER, FOR SOLUTION INTRAMUSCULAR; INTRAVENOUS
Status: DISPENSED
Start: 2024-02-26

## (undated) RX ORDER — LIDOCAINE HYDROCHLORIDE AND EPINEPHRINE 10; 10 MG/ML; UG/ML
INJECTION, SOLUTION INFILTRATION; PERINEURAL
Status: DISPENSED
Start: 2024-02-26